# Patient Record
Sex: FEMALE | Race: WHITE | NOT HISPANIC OR LATINO | Employment: FULL TIME | URBAN - METROPOLITAN AREA
[De-identification: names, ages, dates, MRNs, and addresses within clinical notes are randomized per-mention and may not be internally consistent; named-entity substitution may affect disease eponyms.]

---

## 2017-05-17 ENCOUNTER — APPOINTMENT (OUTPATIENT)
Dept: URGENT CARE | Facility: MEDICAL CENTER | Age: 37
End: 2017-05-17

## 2017-05-17 ENCOUNTER — TRANSCRIBE ORDERS (OUTPATIENT)
Dept: URGENT CARE | Facility: MEDICAL CENTER | Age: 37
End: 2017-05-17

## 2017-05-17 ENCOUNTER — APPOINTMENT (OUTPATIENT)
Dept: LAB | Facility: MEDICAL CENTER | Age: 37
End: 2017-05-17
Attending: PHYSICIAN ASSISTANT

## 2017-05-17 DIAGNOSIS — Z00.8 SPECIAL EXAM: Primary | ICD-10-CM

## 2017-05-17 DIAGNOSIS — Z00.8 SPECIAL EXAM: ICD-10-CM

## 2017-05-17 LAB — RUBV IGG SERPL IA-ACNC: 44.5 IU/ML

## 2017-05-17 PROCEDURE — 86480 TB TEST CELL IMMUN MEASURE: CPT

## 2017-05-17 PROCEDURE — 86735 MUMPS ANTIBODY: CPT

## 2017-05-17 PROCEDURE — 86762 RUBELLA ANTIBODY: CPT

## 2017-05-17 PROCEDURE — 86787 VARICELLA-ZOSTER ANTIBODY: CPT

## 2017-05-17 PROCEDURE — 86765 RUBEOLA ANTIBODY: CPT

## 2017-05-17 PROCEDURE — 36415 COLL VENOUS BLD VENIPUNCTURE: CPT

## 2017-05-18 LAB
MEV IGG SER QL: NORMAL
MUV IGG SER QL: NORMAL

## 2017-05-19 LAB
ANNOTATION COMMENT IMP: NORMAL
GAMMA INTERFERON BACKGROUND BLD IA-ACNC: 0.03 IU/ML
M TB IFN-G BLD-IMP: NEGATIVE
M TB IFN-G CD4+ BCKGRND COR BLD-ACNC: <0.01 IU/ML
M TB IFN-G CD4+ T-CELLS BLD-ACNC: 0.02 IU/ML
MITOGEN IGNF BLD-ACNC: >10 IU/ML
QUANTIFERON-TB GOLD IN TUBE: NORMAL
SERVICE CMNT-IMP: NORMAL

## 2017-05-23 LAB — VZV IGG SER IA-ACNC: NORMAL

## 2020-01-24 ENCOUNTER — APPOINTMENT (EMERGENCY)
Dept: CT IMAGING | Facility: HOSPITAL | Age: 40
End: 2020-01-24
Payer: COMMERCIAL

## 2020-01-24 ENCOUNTER — APPOINTMENT (OUTPATIENT)
Dept: ULTRASOUND IMAGING | Facility: HOSPITAL | Age: 40
End: 2020-01-24
Payer: COMMERCIAL

## 2020-01-24 ENCOUNTER — HOSPITAL ENCOUNTER (OUTPATIENT)
Facility: HOSPITAL | Age: 40
Setting detail: OBSERVATION
Discharge: HOME | End: 2020-01-26
Attending: EMERGENCY MEDICINE | Admitting: STUDENT IN AN ORGANIZED HEALTH CARE EDUCATION/TRAINING PROGRAM
Payer: COMMERCIAL

## 2020-01-24 ENCOUNTER — APPOINTMENT (OUTPATIENT)
Dept: MRI IMAGING | Facility: HOSPITAL | Age: 40
End: 2020-01-24
Payer: COMMERCIAL

## 2020-01-24 DIAGNOSIS — R42 POSTURAL DIZZINESS WITH PRESYNCOPE: ICD-10-CM

## 2020-01-24 DIAGNOSIS — R00.2 HEART PALPITATIONS: ICD-10-CM

## 2020-01-24 DIAGNOSIS — G43.909 MIGRAINE: Primary | ICD-10-CM

## 2020-01-24 DIAGNOSIS — R55 POSTURAL DIZZINESS WITH PRESYNCOPE: ICD-10-CM

## 2020-01-24 PROBLEM — R00.1 SINUS BRADYCARDIA: Status: ACTIVE | Noted: 2020-01-24

## 2020-01-24 PROBLEM — D72.829 LEUKOCYTOSIS: Status: ACTIVE | Noted: 2020-01-24

## 2020-01-24 PROBLEM — R51.9 INTRACTABLE HEADACHE: Status: ACTIVE | Noted: 2020-01-24

## 2020-01-24 LAB
ALBUMIN SERPL BCP-MCNC: 3.6 G/DL (ref 3.5–5)
ALP SERPL-CCNC: 100 U/L (ref 46–116)
ALT SERPL W P-5'-P-CCNC: 55 U/L (ref 12–78)
ANION GAP SERPL CALCULATED.3IONS-SCNC: 11 MMOL/L (ref 4–13)
AST SERPL W P-5'-P-CCNC: 24 U/L (ref 5–45)
ATRIAL RATE: 54 BPM
ATRIAL RATE: 69 BPM
BASOPHILS # BLD AUTO: 0.01 THOUSANDS/ΜL (ref 0–0.1)
BASOPHILS NFR BLD AUTO: 0 % (ref 0–1)
BILIRUB DIRECT SERPL-MCNC: 0.05 MG/DL (ref 0–0.2)
BILIRUB SERPL-MCNC: 0.2 MG/DL (ref 0.2–1)
BILIRUB UR QL STRIP: NEGATIVE
BUN SERPL-MCNC: 15 MG/DL (ref 5–25)
CALCIUM SERPL-MCNC: 8.7 MG/DL (ref 8.3–10.1)
CHLORIDE SERPL-SCNC: 106 MMOL/L (ref 100–108)
CLARITY UR: NORMAL
CO2 SERPL-SCNC: 26 MMOL/L (ref 21–32)
COLOR UR: YELLOW
CREAT SERPL-MCNC: 0.8 MG/DL (ref 0.6–1.3)
EOSINOPHIL # BLD AUTO: 0 THOUSAND/ΜL (ref 0–0.61)
EOSINOPHIL NFR BLD AUTO: 0 % (ref 0–6)
ERYTHROCYTE [DISTWIDTH] IN BLOOD BY AUTOMATED COUNT: 14.1 % (ref 11.6–15.1)
GFR SERPL CREATININE-BSD FRML MDRD: 93 ML/MIN/1.73SQ M
GLUCOSE SERPL-MCNC: 141 MG/DL (ref 65–140)
GLUCOSE UR STRIP-MCNC: NEGATIVE MG/DL
HCT VFR BLD AUTO: 37.4 % (ref 34.8–46.1)
HGB BLD-MCNC: 11.6 G/DL (ref 11.5–15.4)
HGB UR QL STRIP.AUTO: NEGATIVE
IMM GRANULOCYTES # BLD AUTO: 0.03 THOUSAND/UL (ref 0–0.2)
IMM GRANULOCYTES NFR BLD AUTO: 0 % (ref 0–2)
KETONES UR STRIP-MCNC: NEGATIVE MG/DL
LEUKOCYTE ESTERASE UR QL STRIP: NEGATIVE
LYMPHOCYTES # BLD AUTO: 1.46 THOUSANDS/ΜL (ref 0.6–4.47)
LYMPHOCYTES NFR BLD AUTO: 13 % (ref 14–44)
MAGNESIUM SERPL-MCNC: 1.8 MG/DL (ref 1.6–2.6)
MCH RBC QN AUTO: 24.6 PG (ref 26.8–34.3)
MCHC RBC AUTO-ENTMCNC: 31 G/DL (ref 31.4–37.4)
MCV RBC AUTO: 79 FL (ref 82–98)
MONOCYTES # BLD AUTO: 0.9 THOUSAND/ΜL (ref 0.17–1.22)
MONOCYTES NFR BLD AUTO: 8 % (ref 4–12)
NEUTROPHILS # BLD AUTO: 8.54 THOUSANDS/ΜL (ref 1.85–7.62)
NEUTS SEG NFR BLD AUTO: 79 % (ref 43–75)
NITRITE UR QL STRIP: NEGATIVE
NRBC BLD AUTO-RTO: 0 /100 WBCS
P AXIS: 68 DEGREES
P AXIS: 69 DEGREES
PH UR STRIP.AUTO: 6.5 [PH]
PLATELET # BLD AUTO: 311 THOUSANDS/UL (ref 149–390)
PMV BLD AUTO: 9.6 FL (ref 8.9–12.7)
POTASSIUM SERPL-SCNC: 3.6 MMOL/L (ref 3.5–5.3)
PR INTERVAL: 122 MS
PR INTERVAL: 128 MS
PROT SERPL-MCNC: 8.1 G/DL (ref 6.4–8.2)
PROT UR STRIP-MCNC: NEGATIVE MG/DL
QRS AXIS: 60 DEGREES
QRS AXIS: 68 DEGREES
QRSD INTERVAL: 82 MS
QRSD INTERVAL: 94 MS
QT INTERVAL: 388 MS
QT INTERVAL: 414 MS
QTC INTERVAL: 392 MS
QTC INTERVAL: 415 MS
RBC # BLD AUTO: 4.71 MILLION/UL (ref 3.81–5.12)
SODIUM SERPL-SCNC: 143 MMOL/L (ref 136–145)
SP GR UR STRIP.AUTO: 1.02 (ref 1–1.03)
T WAVE AXIS: 52 DEGREES
T WAVE AXIS: 57 DEGREES
TROPONIN I SERPL-MCNC: <0.02 NG/ML
UROBILINOGEN UR QL STRIP.AUTO: 0.2 E.U./DL
VENTRICULAR RATE: 54 BPM
VENTRICULAR RATE: 69 BPM
WBC # BLD AUTO: 10.94 THOUSAND/UL (ref 4.31–10.16)

## 2020-01-24 PROCEDURE — 96375 TX/PRO/DX INJ NEW DRUG ADDON: CPT

## 2020-01-24 PROCEDURE — 84484 ASSAY OF TROPONIN QUANT: CPT | Performed by: EMERGENCY MEDICINE

## 2020-01-24 PROCEDURE — 80076 HEPATIC FUNCTION PANEL: CPT | Performed by: EMERGENCY MEDICINE

## 2020-01-24 PROCEDURE — 99244 OFF/OP CNSLTJ NEW/EST MOD 40: CPT | Performed by: PSYCHIATRY & NEUROLOGY

## 2020-01-24 PROCEDURE — 70551 MRI BRAIN STEM W/O DYE: CPT

## 2020-01-24 PROCEDURE — 70450 CT HEAD/BRAIN W/O DYE: CPT

## 2020-01-24 PROCEDURE — 93010 ELECTROCARDIOGRAM REPORT: CPT | Performed by: INTERNAL MEDICINE

## 2020-01-24 PROCEDURE — 99285 EMERGENCY DEPT VISIT HI MDM: CPT | Performed by: EMERGENCY MEDICINE

## 2020-01-24 PROCEDURE — 99285 EMERGENCY DEPT VISIT HI MDM: CPT

## 2020-01-24 PROCEDURE — 36415 COLL VENOUS BLD VENIPUNCTURE: CPT | Performed by: EMERGENCY MEDICINE

## 2020-01-24 PROCEDURE — 85025 COMPLETE CBC W/AUTO DIFF WBC: CPT | Performed by: EMERGENCY MEDICINE

## 2020-01-24 PROCEDURE — 99220 PR INITIAL OBSERVATION CARE/DAY 70 MINUTES: CPT | Performed by: FAMILY MEDICINE

## 2020-01-24 PROCEDURE — 80048 BASIC METABOLIC PNL TOTAL CA: CPT | Performed by: EMERGENCY MEDICINE

## 2020-01-24 PROCEDURE — 93880 EXTRACRANIAL BILAT STUDY: CPT

## 2020-01-24 PROCEDURE — 83735 ASSAY OF MAGNESIUM: CPT | Performed by: EMERGENCY MEDICINE

## 2020-01-24 PROCEDURE — 84443 ASSAY THYROID STIM HORMONE: CPT | Performed by: NURSE PRACTITIONER

## 2020-01-24 PROCEDURE — 81003 URINALYSIS AUTO W/O SCOPE: CPT | Performed by: EMERGENCY MEDICINE

## 2020-01-24 PROCEDURE — 93005 ELECTROCARDIOGRAM TRACING: CPT

## 2020-01-24 PROCEDURE — 96374 THER/PROPH/DIAG INJ IV PUSH: CPT

## 2020-01-24 RX ORDER — KETAMINE HCL IN NACL, ISO-OSM 100MG/10ML
20 SYRINGE (ML) INJECTION ONCE
Status: COMPLETED | OUTPATIENT
Start: 2020-01-24 | End: 2020-01-24

## 2020-01-24 RX ORDER — DIPHENHYDRAMINE HYDROCHLORIDE 50 MG/ML
25 INJECTION INTRAMUSCULAR; INTRAVENOUS EVERY 8 HOURS
Status: DISCONTINUED | OUTPATIENT
Start: 2020-01-24 | End: 2020-01-25

## 2020-01-24 RX ORDER — TOPIRAMATE 25 MG/1
TABLET ORAL 2 TIMES DAILY
Status: ON HOLD | COMMUNITY
End: 2020-01-26 | Stop reason: SDUPTHER

## 2020-01-24 RX ORDER — MECLIZINE HYDROCHLORIDE 25 MG/1
25 TABLET ORAL EVERY 8 HOURS PRN
Status: DISCONTINUED | OUTPATIENT
Start: 2020-01-24 | End: 2020-01-26 | Stop reason: HOSPADM

## 2020-01-24 RX ORDER — ONDANSETRON 2 MG/ML
4 INJECTION INTRAMUSCULAR; INTRAVENOUS ONCE
Status: COMPLETED | OUTPATIENT
Start: 2020-01-24 | End: 2020-01-24

## 2020-01-24 RX ORDER — TOPIRAMATE 25 MG/1
25 TABLET ORAL 2 TIMES DAILY
Status: DISCONTINUED | OUTPATIENT
Start: 2020-01-24 | End: 2020-01-26 | Stop reason: HOSPADM

## 2020-01-24 RX ORDER — SODIUM CHLORIDE 9 MG/ML
125 INJECTION, SOLUTION INTRAVENOUS CONTINUOUS
Status: DISCONTINUED | OUTPATIENT
Start: 2020-01-24 | End: 2020-01-26

## 2020-01-24 RX ORDER — SUMATRIPTAN 50 MG/1
50 TABLET, FILM COATED ORAL ONCE
Status: COMPLETED | OUTPATIENT
Start: 2020-01-24 | End: 2020-01-24

## 2020-01-24 RX ORDER — PREDNISONE 50 MG/1
TABLET ORAL
COMMUNITY
End: 2020-01-26 | Stop reason: HOSPADM

## 2020-01-24 RX ORDER — DIPHENHYDRAMINE HYDROCHLORIDE 50 MG/ML
50 INJECTION INTRAMUSCULAR; INTRAVENOUS ONCE
Status: COMPLETED | OUTPATIENT
Start: 2020-01-24 | End: 2020-01-24

## 2020-01-24 RX ORDER — MECLIZINE HYDROCHLORIDE 25 MG/1
25 TABLET ORAL 3 TIMES DAILY PRN
COMMUNITY
Start: 2020-01-23 | End: 2020-02-11 | Stop reason: ALTCHOICE

## 2020-01-24 RX ORDER — METOCLOPRAMIDE 10 MG/1
10 TABLET ORAL 4 TIMES DAILY
COMMUNITY
Start: 2020-01-23 | End: 2020-01-26 | Stop reason: HOSPADM

## 2020-01-24 RX ORDER — METOCLOPRAMIDE 10 MG/1
10 TABLET ORAL 4 TIMES DAILY
Status: DISCONTINUED | OUTPATIENT
Start: 2020-01-24 | End: 2020-01-24

## 2020-01-24 RX ORDER — LIDOCAINE 50 MG/G
2 PATCH TOPICAL DAILY
Status: DISCONTINUED | OUTPATIENT
Start: 2020-01-24 | End: 2020-01-26 | Stop reason: HOSPADM

## 2020-01-24 RX ORDER — KETOROLAC TROMETHAMINE 30 MG/ML
30 INJECTION, SOLUTION INTRAMUSCULAR; INTRAVENOUS ONCE
Status: COMPLETED | OUTPATIENT
Start: 2020-01-24 | End: 2020-01-24

## 2020-01-24 RX ORDER — MAGNESIUM SULFATE HEPTAHYDRATE 40 MG/ML
2 INJECTION, SOLUTION INTRAVENOUS DAILY
Status: DISCONTINUED | OUTPATIENT
Start: 2020-01-24 | End: 2020-01-26 | Stop reason: HOSPADM

## 2020-01-24 RX ORDER — CYCLOBENZAPRINE HCL 10 MG
5 TABLET ORAL 3 TIMES DAILY
Status: DISCONTINUED | OUTPATIENT
Start: 2020-01-24 | End: 2020-01-25

## 2020-01-24 RX ORDER — KETOROLAC TROMETHAMINE 30 MG/ML
30 INJECTION, SOLUTION INTRAMUSCULAR; INTRAVENOUS EVERY 8 HOURS
Status: DISCONTINUED | OUTPATIENT
Start: 2020-01-24 | End: 2020-01-26 | Stop reason: HOSPADM

## 2020-01-24 RX ORDER — METOCLOPRAMIDE HYDROCHLORIDE 5 MG/ML
10 INJECTION INTRAMUSCULAR; INTRAVENOUS EVERY 8 HOURS
Status: DISCONTINUED | OUTPATIENT
Start: 2020-01-24 | End: 2020-01-26 | Stop reason: HOSPADM

## 2020-01-24 RX ADMIN — VALPROATE SODIUM 1000 MG: 100 INJECTION, SOLUTION INTRAVENOUS at 16:18

## 2020-01-24 RX ADMIN — KETOROLAC TROMETHAMINE 30 MG: 30 INJECTION, SOLUTION INTRAMUSCULAR at 04:48

## 2020-01-24 RX ADMIN — SODIUM CHLORIDE 1000 MG: 0.9 INJECTION, SOLUTION INTRAVENOUS at 17:34

## 2020-01-24 RX ADMIN — SODIUM CHLORIDE 1000 ML: 0.9 INJECTION, SOLUTION INTRAVENOUS at 16:00

## 2020-01-24 RX ADMIN — MECLIZINE HYDROCHLORIDE 25 MG: 25 TABLET ORAL at 12:07

## 2020-01-24 RX ADMIN — KETOROLAC TROMETHAMINE 30 MG: 30 INJECTION, SOLUTION INTRAMUSCULAR at 22:52

## 2020-01-24 RX ADMIN — METOCLOPRAMIDE HYDROCHLORIDE 10 MG: 5 INJECTION INTRAMUSCULAR; INTRAVENOUS at 22:53

## 2020-01-24 RX ADMIN — Medication 20 MG: at 04:51

## 2020-01-24 RX ADMIN — METOCLOPRAMIDE HYDROCHLORIDE 10 MG: 10 TABLET ORAL at 12:07

## 2020-01-24 RX ADMIN — CYCLOBENZAPRINE HYDROCHLORIDE 5 MG: 10 TABLET, FILM COATED ORAL at 15:56

## 2020-01-24 RX ADMIN — DIPHENHYDRAMINE HYDROCHLORIDE 25 MG: 50 INJECTION, SOLUTION INTRAMUSCULAR; INTRAVENOUS at 22:55

## 2020-01-24 RX ADMIN — SODIUM CHLORIDE 125 ML/HR: 0.9 INJECTION, SOLUTION INTRAVENOUS at 12:17

## 2020-01-24 RX ADMIN — ONDANSETRON 4 MG: 2 INJECTION INTRAMUSCULAR; INTRAVENOUS at 04:46

## 2020-01-24 RX ADMIN — TOPIRAMATE 25 MG: 25 TABLET, FILM COATED ORAL at 17:41

## 2020-01-24 RX ADMIN — KETOROLAC TROMETHAMINE 30 MG: 30 INJECTION, SOLUTION INTRAMUSCULAR at 15:57

## 2020-01-24 RX ADMIN — DIPHENHYDRAMINE HYDROCHLORIDE 25 MG: 50 INJECTION, SOLUTION INTRAMUSCULAR; INTRAVENOUS at 15:58

## 2020-01-24 RX ADMIN — SUMATRIPTAN SUCCINATE 50 MG: 50 TABLET ORAL at 12:17

## 2020-01-24 RX ADMIN — DIPHENHYDRAMINE HYDROCHLORIDE 50 MG: 50 INJECTION, SOLUTION INTRAMUSCULAR; INTRAVENOUS at 04:49

## 2020-01-24 RX ADMIN — METOCLOPRAMIDE HYDROCHLORIDE 10 MG: 5 INJECTION INTRAMUSCULAR; INTRAVENOUS at 15:57

## 2020-01-24 RX ADMIN — CYCLOBENZAPRINE HYDROCHLORIDE 5 MG: 10 TABLET, FILM COATED ORAL at 22:51

## 2020-01-24 RX ADMIN — MAGNESIUM SULFATE HEPTAHYDRATE 2 G: 40 INJECTION, SOLUTION INTRAVENOUS at 15:59

## 2020-01-24 RX ADMIN — LIDOCAINE 2 PATCH: 50 PATCH TOPICAL at 15:53

## 2020-01-24 NOTE — H&P
H&P- Pennie Rodrigues 1980, 45 y o  female MRN: [id number]    Unit/Bed#: ED 18 Encounter: [id number]    Primary Care Provider: Reese Juarez DO   Date and time admitted to hospital: 1/24/2020  2:41 AM        Leukocytosis  Assessment & Plan  Noted mild leukocytosis  Likely reactive due to recent use of prednisone  Sinus bradycardia  Assessment & Plan  Noted bradycardia  Patient does report occasional palpitations  Will check thyroid    * Intractable headache  Assessment & Plan  This is likely related to migraine  However, the quality of migraine has changed within the past 2 months  Patient now has headaches every day  The intensity has also worsened  Patient has been started on Topamax, however, that has not improved her headaches  Will provide with Imitrex at this time  MRI of the brain  Neurology consult  VTE Prophylaxis: Other Medication: Ambulation  / sequential compression device   Code Status: full  POLST: POLST is not applicable to this patient  Discussion with family:  Patient's significant other    Anticipated Length of Stay:  Patient will be admitted on an Observation basis with an anticipated length of stay of  less than 2 midnights  Justification for Hospital Stay:  Intractable headache    Total Time for Visit, including Counseling / Coordination of Care: 1 hour  Greater than 50% of this total time spent on direct patient counseling and coordination of care  Chief Complaint:   Headache    History of Present Illness:    Pennie Rodrigues is a 45 y o  female who presents with an intractable headache  Patient states that her headache is currently rated at 9/10  It is associated with strong photophobia and phonophobia as well as dizziness  Patient reports worsening of headaches and increased frequency for the past 4 months  Overall, she has been struggling with headaches for the past 5 years, but only for the past several months she started having daily headaches    They are also associated with blurry vision and dizziness  Patient states that her neurologist has been working up her headaches and ordered MRI, but she has not done it yet  She denies any vomiting, abdominal pain, shortness of breath or chest pain  Patient reports a positive family history of migraines as well  Review of Systems:    Review of Systems   Constitutional: Positive for activity change  Negative for appetite change, fatigue and fever  HENT: Negative for congestion  Eyes: Positive for photophobia and visual disturbance  Negative for discharge  Respiratory: Negative for shortness of breath  Cardiovascular: Negative for chest pain and leg swelling  Gastrointestinal: Negative for abdominal pain  Genitourinary: Negative for dysuria and flank pain  Musculoskeletal: Negative for back pain and gait problem  Skin: Negative for pallor  Neurological: Positive for dizziness and headaches  Negative for weakness, light-headedness and numbness  Psychiatric/Behavioral: Negative for behavioral problems  Past Medical and Surgical History:     Past Medical History:   Diagnosis Date    Ear infection     Migraine        Past Surgical History:   Procedure Laterality Date     SECTION         Meds/Allergies:    Prior to Admission medications    Medication Sig Start Date End Date Taking? Authorizing Provider   meclizine (ANTIVERT) 25 mg tablet Take 25 mg by mouth Three times daily as needed 20 Yes Historical Provider, MD   metoclopramide (REGLAN) 10 mg tablet Take 10 mg by mouth 4 (four) times a day 20 Yes Historical Provider, MD   predniSONE 50 mg tablet Take by mouth   Yes Historical Provider, MD   topiramate (TOPAMAX) 25 mg tablet Take by mouth 2 (two) times a day   Yes Historical Provider, MD     I have reviewed home medications with patient personally  Allergies:    Allergies   Allergen Reactions    Levaquin [Levofloxacin] Hallucinations    Lexapro [Escitalopram]        Social History:     Marital Status: /Civil Union   Occupation:   Patient Pre-hospital Living Situation:  Lives with the family  Patient Pre-hospital Level of Mobility:  Independent  Patient Pre-hospital Diet Restrictions:  None  Substance Use History:   Social History     Substance and Sexual Activity   Alcohol Use Never    Frequency: Never     Social History     Tobacco Use   Smoking Status Never Smoker   Smokeless Tobacco Never Used     Social History     Substance and Sexual Activity   Drug Use Not on file       Family History:    Migraines    Physical Exam:     Vitals:   Blood Pressure: 134/58 (01/24/20 0930)  Pulse: (!) 47 (01/24/20 0930)  Temperature: 97 8 °F (36 6 °C) (01/24/20 0247)  Temp Source: Oral (01/24/20 0247)  Respirations: 16 (01/24/20 0930)  Height: 5' 3" (160 cm) (01/24/20 0247)  Weight - Scale: 85 1 kg (187 lb 9 8 oz) (01/24/20 0245)  SpO2: 100 % (01/24/20 0930)    Physical Exam   Constitutional: She is oriented to person, place, and time  She appears well-developed and well-nourished  HENT:   Head: Normocephalic and atraumatic  Eyes: Pupils are equal, round, and reactive to light  Conjunctivae are normal    Neck: Normal range of motion  Cardiovascular: Normal rate and regular rhythm  Pulmonary/Chest: Effort normal and breath sounds normal    Abdominal: Soft  She exhibits no distension  Musculoskeletal: She exhibits no edema  Neurological: She is alert and oriented to person, place, and time  She has normal strength  No cranial nerve deficit  Skin: Skin is warm  No erythema  Psychiatric: She has a normal mood and affect  Her behavior is normal          Additional Data:     Lab Results: I have personally reviewed pertinent reports        Results from last 7 days   Lab Units 01/24/20  0321   WBC Thousand/uL 10 94*   HEMOGLOBIN g/dL 11 6   HEMATOCRIT % 37 4   PLATELETS Thousands/uL 311   NEUTROS PCT % 79*   LYMPHS PCT % 13*   MONOS PCT % 8   EOS PCT % 0 Results from last 7 days   Lab Units 01/24/20  0321   SODIUM mmol/L 143   POTASSIUM mmol/L 3 6   CHLORIDE mmol/L 106   CO2 mmol/L 26   BUN mg/dL 15   CREATININE mg/dL 0 80   ANION GAP mmol/L 11   CALCIUM mg/dL 8 7   ALBUMIN g/dL 3 6   TOTAL BILIRUBIN mg/dL 0 20   ALK PHOS U/L 100   ALT U/L 55   AST U/L 24   GLUCOSE RANDOM mg/dL 141*                       Imaging: I have personally reviewed pertinent reports  CT head without contrast   Final Result by Eva Webb MD (01/24 0417)      No acute intracranial abnormality  Workstation performed: [id number]         MRI inpatient order    (Results Pending)       EKG, Pathology, and Other Studies Reviewed on Admission:   · EKG:     Allscripts / Epic Records Reviewed: Yes     ** Please Note: This note has been constructed using a voice recognition system   **

## 2020-01-24 NOTE — QUICK NOTE
Entered the room to give patient medication and to try and get her blood work but patients  asked me to leave so the patient could sleep stating she just fell asleep  Will try again in a short while

## 2020-01-24 NOTE — ED PROVIDER NOTES
History  Chief Complaint   Patient presents with    Chest Pain     pt [place] [facility] [place]raines and near syncopal episodes; pt currently c/o intermittant chest heaviness and pressure with sob     45year old female patient presents emergency department for evaluation of palpitations  The patient states that she felt that her heart was beating erratically and fast she describes as a quivering  The patient is currently in a sinus rhythm at a rate of between 60 and 62  Her periods of sinus arrhythmia  The patient has no cardiac history  Earlier today the patient was seen at another facility for migraines  Her headache has lasted for approximately four months  She has seen Neurology, given a cocktail medications that did break the headache which returned today  Currently the patient has no neurologic dysfunction but does states some blurry vision with the headaches  There has been no imaging of her brain done prior to today  Currently patient is lying in bed, in mild distress mostly nervous of finding something bad on the CT scan  Patient did have two near syncopal episodes today as well  Patient will be given IV fluids, bloods were drawn, the patient will be evaluated with a differential diagnosis includes was not limited to intracranial hemorrhage, subacute stroke, atypical migraine, dehydration  History provided by:  Patient   used: No    Palpitations   Palpitations quality:  Regular  Onset quality:  Sudden  Timing:  Constant  Progression:  Worsening  Chronicity:  New  Context: not anxiety, not blood loss, not bronchodilators, not dehydration and not hyperventilation    Relieved by:  Nothing  Worsened by:  Nothing  Ineffective treatments:  None tried  Associated symptoms: no chest pressure, no cough, no leg pain, no malaise/fatigue, no orthopnea and no PND        Prior to Admission Medications   Prescriptions Last Dose Informant Patient Reported?  Taking?   meclizine (ANTIVERT) 25 mg tablet 2020 at Unknown time  Yes Yes   Sig: Take 25 mg by mouth Three times daily as needed   metoclopramide (REGLAN) 10 mg tablet 2020 at Unknown time  Yes Yes   Sig: Take 10 mg by mouth 4 (four) times a day   predniSONE 50 mg tablet 2020 at Unknown time  Yes Yes   Sig: Take by mouth   topiramate (TOPAMAX) 25 mg tablet 2020 at Unknown time  Yes Yes   Sig: Take by mouth 2 (two) times a day      Facility-Administered Medications: None       Past Medical History:   Diagnosis Date    Ear infection     Migraine        Past Surgical History:   Procedure Laterality Date     SECTION         History reviewed  No pertinent family history  I have reviewed and agree with the history as documented  Social History     Tobacco Use    Smoking status: Never Smoker    Smokeless tobacco: Never Used   Substance Use Topics    Alcohol use: Never     Frequency: Never    Drug use: Not on file        Review of Systems   Constitutional: Negative for malaise/fatigue  Respiratory: Negative for cough  Cardiovascular: Negative for orthopnea and PND  All other systems reviewed and are negative  Physical Exam  Physical Exam   Constitutional: She is oriented to person, place, and time  She appears well-developed and well-nourished  HENT:   Head: Normocephalic and atraumatic  Right Ear: External ear normal    Left Ear: External ear normal    Eyes: Conjunctivae and EOM are normal    Neck: No JVD present  No tracheal deviation present  No thyromegaly present  Cardiovascular: Normal rate  Pulmonary/Chest: Effort normal and breath sounds normal  No stridor  Abdominal: Soft  She exhibits no distension and no mass  There is no tenderness  There is no guarding  No hernia  Musculoskeletal: Normal range of motion  She exhibits no edema, tenderness or deformity  Lymphadenopathy:     She has no cervical adenopathy     Neurological: She is alert and oriented to person, place, and time    Skin: Skin is warm  No rash noted  No erythema  No pallor  Psychiatric: She has a normal mood and affect  Her behavior is normal    Nursing note and vitals reviewed        Vital Signs  ED Triage Vitals   Temperature Pulse Respirations Blood Pressure SpO2   01/24/20 0247 01/24/20 0247 01/24/20 0247 01/24/20 0247 01/24/20 0247   97 8 °F (36 6 °C) 75 20 165/91 99 %      Temp Source Heart Rate Source Patient Position - Orthostatic VS BP Location FiO2 (%)   01/24/20 0247 01/24/20 0247 01/24/20 1217 01/24/20 0330 --   Oral Monitor Lying Left arm       Pain Score       01/24/20 0313       4           Vitals:    01/25/20 0759 01/25/20 1509 01/25/20 2345 01/26/20 0803   BP: 127/66 139/80 134/73 149/76   Pulse: (!) 50 (!) 52 56 (!) 39   Patient Position - Orthostatic VS:             Visual Acuity  Visual Acuity      Most Recent Value   L Pupil Size (mm)  3   R Pupil Size (mm)  3          ED Medications  Medications   meclizine (ANTIVERT) tablet 25 mg (25 mg Oral Given 1/26/20 0923)   topiramate (TOPAMAX) tablet 25 mg (25 mg Oral Given 1/26/20 0850)   methylPREDNISolone sodium succinate (Solu-MEDROL) 1,000 mg in sodium chloride 0 9 % 250 mL IVPB (1,000 mg Intravenous Not Given 1/26/20 1147)   ketorolac (TORADOL) injection 30 mg (30 mg Intravenous Given 1/26/20 0850)   metoclopramide (REGLAN) injection 10 mg (10 mg Intravenous Given 1/26/20 0850)   magnesium sulfate 2 g/50 mL IVPB (premix) 2 g (2 g Intravenous New Bag 1/26/20 0849)   valproate (DEPACON) 1,000 mg in sodium chloride 0 9 % 50 mL BOLUS (1,000 mg Intravenous Not Given 1/26/20 1147)   lidocaine (LIDODERM) 5 % patch 2 patch (2 patches Topical Medication Applied 1/26/20 0849)   sodium chloride 0 9 % bolus 1,000 mL (1,000 mL Intravenous Not Given 1/26/20 1147)   diphenhydrAMINE (BENADRYL) injection 50 mg (50 mg Intravenous Given 1/24/20 0449)   ketorolac (TORADOL) injection 30 mg (30 mg Intravenous Given 1/24/20 0448)   ondansetron (ZOFRAN) injection 4 mg (4 mg Intravenous Given 1/24/20 0446)   Ketamine HCl 20 mg (20 mg Intravenous Given 1/24/20 0451)   SUMAtriptan (IMITREX) tablet 50 mg (50 mg Oral Given 1/24/20 1217)   diazepam (VALIUM) tablet 2 5 mg (2 5 mg Oral Given 1/26/20 1233)       Diagnostic Studies  Results Reviewed     Procedure Component Value Units Date/Time    TSH, 3rd generation [[id number]]  (Abnormal) Collected:  01/24/20 0321    Lab Status:  Final result Specimen:  Blood from Arm, Right Updated:  01/25/20 1207     TSH 3RD GENERATON 0 281 uIU/mL     Narrative:       Patients undergoing fluorescein dye angiography may retain small amounts of fluorescein in the body for 48-72 hours post procedure  Samples containing fluorescein can produce falsely depressed TSH values  If the patient had this procedure,a specimen should be resubmitted post fluorescein clearance        UA w Reflex to Microscopic w Reflex to Culture [[id number]] Collected:  01/24/20 1001    Lab Status:  Final result Specimen:  Urine, Clean Catch Updated:  01/24/20 1017     Color, UA Yellow     Clarity, UA Cloudy     Specific Gravity, UA 1 025     pH, UA 6 5     Leukocytes, UA Negative     Nitrite, UA Negative     Protein, UA Negative mg/dl      Glucose, UA Negative mg/dl      Ketones, UA Negative mg/dl      Urobilinogen, UA 0 2 E U /dl      Bilirubin, UA Negative     Blood, UA Negative    Troponin I [[id number]]  (Normal) Collected:  01/24/20 0446    Lab Status:  Final result Specimen:  Blood from Arm, Right Updated:  01/24/20 0541     Troponin I <0 02 ng/mL     Basic metabolic panel [[id number]]  (Abnormal) Collected:  01/24/20 0321    Lab Status:  Final result Specimen:  Blood from Arm, Right Updated:  01/24/20 0350     Sodium 143 mmol/L      Potassium 3 6 mmol/L      Chloride 106 mmol/L      CO2 26 mmol/L      ANION GAP 11 mmol/L      BUN 15 mg/dL      Creatinine 0 80 mg/dL      Glucose 141 mg/dL      Calcium 8 7 mg/dL      eGFR 93 ml/min/1 73sq m     Narrative:       National Kidney Disease Foundation guidelines for Chronic Kidney Disease (CKD):     Stage 1 with normal or high GFR (GFR > 90 mL/min/1 73 square meters)    Stage 2 Mild CKD (GFR = 60-89 mL/min/1 73 square meters)    Stage 3A Moderate CKD (GFR = 45-59 mL/min/1 73 square meters)    Stage 3B Moderate CKD (GFR = 30-44 mL/min/1 73 square meters)    Stage 4 Severe CKD (GFR = 15-29 mL/min/1 73 square meters)    Stage 5 End Stage CKD (GFR <15 mL/min/1 73 square meters)  Note: GFR calculation is accurate only with a steady state creatinine    Hepatic function panel [[id number]]  (Normal) Collected:  01/24/20 0321    Lab Status:  Final result Specimen:  Blood from Arm, Right Updated:  01/24/20 0350     Total Bilirubin 0 20 mg/dL      Bilirubin, Direct 0 05 mg/dL      Alkaline Phosphatase 100 U/L      AST 24 U/L      ALT 55 U/L      Total Protein 8 1 g/dL      Albumin 3 6 g/dL     Magnesium [[id number]]  (Normal) Collected:  01/24/20 0321    Lab Status:  Final result Specimen:  Blood from Arm, Right Updated:  01/24/20 0350     Magnesium 1 8 mg/dL     CBC and differential [[id number]]  (Abnormal) Collected:  01/24/20 0321    Lab Status:  Final result Specimen:  Blood from Arm, Right Updated:  01/24/20 0333     WBC 10 94 Thousand/uL      RBC 4 71 Million/uL      Hemoglobin 11 6 g/dL      Hematocrit 37 4 %      MCV 79 fL      MCH 24 6 pg      MCHC 31 0 g/dL      RDW 14 1 %      MPV 9 6 fL      Platelets 334 Thousands/uL      nRBC 0 /100 WBCs      Neutrophils Relative 79 %      Immat GRANS % 0 %      Lymphocytes Relative 13 %      Monocytes Relative 8 %      Eosinophils Relative 0 %      Basophils Relative 0 %      Neutrophils Absolute 8 54 Thousands/µL      Immature Grans Absolute 0 03 Thousand/uL      Lymphocytes Absolute 1 46 Thousands/µL      Monocytes Absolute 0 90 Thousand/µL      Eosinophils Absolute 0 00 Thousand/µL      Basophils Absolute 0 01 Thousands/µL                  MRI brain wo contrast   Final Result by Enrique Weinberg DO (01/24 1640)      Normal       Workstation performed: [id number]         VAS carotid complete study   Final Result by Nito Aguilar MD (01/25 1141)      CT head without contrast   Final Result by Erlin Buckley MD (01/24 0417)      No acute intracranial abnormality  Workstation performed: [id number]                    Procedures  Procedures         ED Course         HEART Risk Score      Most Recent Value   History  0 Filed at: 01/24/2020 0631   ECG  0 Filed at: 01/24/2020 0631   Age  0 Filed at: 01/24/2020 0631   Risk Factors  1 Filed at: 01/24/2020 0631   Troponin  0 Filed at: 01/24/2020 0631   Heart Score Risk Calculator   History  0 Filed at: 01/24/2020 0631   ECG  0 Filed at: 01/24/2020 0631   Age  0 Filed at: 01/24/2020 0631   Risk Factors  1 Filed at: 01/24/2020 0631   Troponin  0 Filed at: 01/24/2020 0631   HEART Score  1 Filed at: 01/24/2020 0631   HEART Score  1 Filed at: 01/24/2020 6203                            MDM        Disposition  Final diagnoses:   Heart palpitations     Time reflects when diagnosis was documented in both MDM as applicable and the Disposition within this note     Time User Action Codes Description Comment    1/24/2020 11:32 AM [facility] Add [G43 909] Migraine     1/26/2020  3:21 PM Geraline Adjutant Add [M68,  R55] Postural dizziness with presyncope     1/26/2020  4:42 PM Thora Muster Add [R00 2] Heart palpitations       ED Disposition     ED Disposition Condition Date/Time Comment    Admit Stable Fri Jan 24, 2020  6:26 AM Case was discussed with Melissa Serna and the patient's admission status was agreed to be Admission Status: observation status to the service of Dr Eliseo Christopher           Follow-up Information     Follow up With Specialties Details Why Contact Info    Ezio García DO Family Medicine Follow up  [address] [place] [address]      Cornelia Zheng MD Neurology Follow up  [place] [address]  [phone] Discharge Medication List as of 1/26/2020  3:35 PM      START taking these medications    Details   diazepam (VALIUM) 5 mg tablet Take 0 5 tablets (2 5 mg total) by mouth every 6 (six) hours as needed for anxiety (dizziness) for up to 10 days, Starting Sun 1/26/2020, Until Wed 2/5/2020, Print         CONTINUE these medications which have CHANGED    Details   predniSONE 20 mg tablet 60 mg daily x2 days; 50 mg daily x2 days; 40 mg daily x2 days; 30 mg daily x2 days; 20 mg daily x2 days; 10 mg daily x2 days then stop, Print      topiramate (TOPAMAX) 50 MG tablet Take 1 tablet (50 mg total) by mouth 2 (two) times a day, Starting Sun 1/26/2020, Print         CONTINUE these medications which have NOT CHANGED    Details   meclizine (ANTIVERT) 25 mg tablet Take 25 mg by mouth Three times daily as needed, Starting Thu 1/23/2020, Until Fri 1/22/2021, Historical Med         STOP taking these medications       metoclopramide (REGLAN) 10 mg tablet Comments:   Reason for Stopping:             Outpatient Discharge Orders   Ambulatory referral to Neurology   Standing Status: Future Standing Exp  Date: 01/26/21      Ambulatory referral to Physical Therapy   Standing Status: Future Standing Exp   Date: 01/26/21      Activity as tolerated       ED Provider  Electronically Signed by           Elkin Cho DO  01/26/20 7243

## 2020-01-24 NOTE — ASSESSMENT & PLAN NOTE
This is likely related to migraine  However, the quality of migraine has changed within the past 2 months  Patient now has headaches every day  The intensity has also worsened  Patient has been started on Topamax, however, that has not improved her headaches  Will provide with Imitrex at this time  MRI of the brain  Neurology consult

## 2020-01-24 NOTE — LETTER
[address] [address]  Dept: [phone]    January 26, 2020     Patient: Suleman Up   YOB: 1980   Date of Visit: 1/24/2020       To Whom it May Concern:    Suleman Up is under my professional care  She was seen in the hospital from 1/24/2020   to 01/26/20  She may return to work on Tuesday January 28, 2020 without limitations  If you have any questions or concerns, please don't hesitate to call           Sincerely,          Erica Le

## 2020-01-24 NOTE — CONSULTS
PATIENT NAME: Michelle Mejia OF BIRTH: 1980   MEDICAL RECORD NUMBER: [id number]  Chief Complaint/Reason for Consult: migraine    HPI: Jun Alexandra is a 45 y o  female with episodic migraines who came in for  Palpitations  Yesterday prior to coming to us she came into a [facility] facility with complaints of dizziness for a day and a "constant migraine for the past 4 months" and had seen a neurologist the day before and was started on Prednisone and Topamax  She was also to obtain an MRI with contrast  She has had a few near syncopal episodes as well  She otherwise has no diplopia, dysphagia, numbness weakness or tingling  She has photophobia and nausea with her headaches  She has a history of migraine for years as does her mother  PAST MEDICAL HISTORY  Past Medical History:   Diagnosis Date    Ear infection     Migraine         PAST SURGICAL HISTORY  Past Surgical History:   Procedure Laterality Date     SECTION          ALLERGIES: Levaquin [levofloxacin] and Lexapro [escitalopram]     CURRENT MEDICATIONS  Scheduled Meds:  Current Facility-Administered Medications:  meclizine 25 mg Oral Q8H PRN Terri Keyes MD    metoclopramide 10 mg Oral 4x Daily Terri Keyes MD    sodium chloride 125 mL/hr Intravenous Continuous Terri Keyes MD Last Rate: 125 mL/hr (20 1217)   topiramate 25 mg Oral BID Terri Keyes MD      Continuous Infusions:  sodium chloride 125 mL/hr Last Rate: 125 mL/hr (20)     PRN Meds:   meclizine     SOCIAL HISTORY   reports that she has never smoked  She has never used smokeless tobacco  She reports that she does not drink alcohol  FAMILY HISTORY  Negative except for HPI  REVIEW OF SYSTEMS   Extensive 12 point ROS conducted, negative except per HPI       PHYSICAL EXAMINATION  Temp:  [97 8 °F (36 6 °C)] 97 8 °F (36 6 °C)  HR:  [47-75] 64  Resp:  [15-20] 16  BP: (108-165)/(58-91) 155/74   General Examination: In no apparent distress, well developed and well nourished, and cooperative   HEENT: Normocephalic, Atraumatic  Moist mucus membranes  Anicteric  PPC    CVS: Regular rate and rhythm  S1 S2 noted  No audible murmurs  No carotids bruits  Peripheral pulses palpable throughout   Lungs: Clear to auscultation bilaterally  No rales, rhonchi, wheezing  Abdomen: Bowel sounds positive  Non- tender  Non-distended  No organomegaly  Ext: No edema   Psych: Thought content - No VH/AH  No delusions  Though Process - logical    Skin - No rash    Neurological Examination:      Mental Status: The patient was awake, alert, attentive, oriented to person, place, and time  Recent and remote memory intact to conversation with no evidence of language dysfunction  Satisfactory fund of knowledge  Normal attention span and concentration  Able to name, repeat, describe a complex scene  Cranial Nerves:   I: smell Not tested   II: visual fields Full to confrontation  Pupils equal, round, reactive to light with normal accomodation  Fundus: benign fundus  III,IV,VI: extraocular muscles EOMI, no nystagmus   V: masseter and pterygoid strength full  Sensation in the V1 through V3 distributions intact to pinprick and light touch bilaterally  VII: Face is symmetric with no weakness noted  VIII: Audition intact to finger rub bilaterally  IX/X: Uvula midline  Soft palate elevation symmetric  XI: Trapezius and SCM strength 5/5 B/L  XII: Tongue midline with no atrophy or fasciculations with appropriate movement  Motor Examination:   No pronator drift  Bulk: Normal  No atrophy Tone: Normal  Fasciculations: None        Deltoid Biceps Triceps WE   WF   FF IO     Right        5         5          5         5      5      5   5        Left           5        5          5          5      5     5   5                       IP        Quad   Ham     TA       Gastroc   Right      5            5          5         5                5  Left         5            5         5 5                5       Reflexes:                   Biceps Brachioradialis Triceps Patella Achilles Plantars   Right          2+            2+                  2+        2+       2+         Down   Left            2+             2+                 2+         2+       2+         Down     Clonus: None    Pathological Reflexes:  Hoffmans: negative  Babinsky: negative  Jaw Jerk: negative    Coordination: Patient able to perform normal finger-to-nose and heel to shin appropriately  Normal rapid alternating movements  Sensory: Normal sensation to light touch, pin prick and vibratory sensation throughout  Gait:normal stance and posture, normal stride length and arm swing, normal turn around  Patient able to perform tandem gait without difficulty  Able toe walk and heel walk without difficulty  Romberg negative      Labs:  Recent Results (from the past 24 hour(s))   CBC and differential    Collection Time: 01/24/20  3:21 AM   Result Value Ref Range    WBC 10 94 (H) 4 31 - 10 16 Thousand/uL    RBC 4 71 3 81 - 5 12 Million/uL    Hemoglobin 11 6 11 5 - 15 4 g/dL    Hematocrit 37 4 34 8 - 46 1 %    MCV 79 (L) 82 - 98 fL    MCH 24 6 (L) 26 8 - 34 3 pg    MCHC 31 0 (L) 31 4 - 37 4 g/dL    RDW 14 1 11 6 - 15 1 %    MPV 9 6 8 9 - 12 7 fL    Platelets 609 848 - 673 Thousands/uL    nRBC 0 /100 WBCs    Neutrophils Relative 79 (H) 43 - 75 %    Immat GRANS % 0 0 - 2 %    Lymphocytes Relative 13 (L) 14 - 44 %    Monocytes Relative 8 4 - 12 %    Eosinophils Relative 0 0 - 6 %    Basophils Relative 0 0 - 1 %    Neutrophils Absolute 8 54 (H) 1 85 - 7 62 Thousands/µL    Immature Grans Absolute 0 03 0 00 - 0 20 Thousand/uL    Lymphocytes Absolute 1 46 0 60 - 4 47 Thousands/µL    Monocytes Absolute 0 90 0 17 - 1 22 Thousand/µL    Eosinophils Absolute 0 00 0 00 - 0 61 Thousand/µL    Basophils Absolute 0 01 0 00 - 0 10 Thousands/µL   Basic metabolic panel    Collection Time: 01/24/20  3:21 AM   Result Value Ref Range    Sodium 143 136 - 145 mmol/L    Potassium 3 6 3 5 - 5 3 mmol/L    Chloride 106 100 - 108 mmol/L    CO2 26 21 - 32 mmol/L    ANION GAP 11 4 - 13 mmol/L    BUN 15 5 - 25 mg/dL    Creatinine 0 80 0 60 - 1 30 mg/dL    Glucose 141 (H) 65 - 140 mg/dL    Calcium 8 7 8 3 - 10 1 mg/dL    eGFR 93 ml/min/1 73sq m   Hepatic function panel    Collection Time: 01/24/20  3:21 AM   Result Value Ref Range    Total Bilirubin 0 20 0 20 - 1 00 mg/dL    Bilirubin, Direct 0 05 0 00 - 0 20 mg/dL    Alkaline Phosphatase 100 46 - 116 U/L    AST 24 5 - 45 U/L    ALT 55 12 - 78 U/L    Total Protein 8 1 6 4 - 8 2 g/dL    Albumin 3 6 3 5 - 5 0 g/dL   Magnesium    Collection Time: 01/24/20  3:21 AM   Result Value Ref Range    Magnesium 1 8 1 6 - 2 6 mg/dL   Troponin I    Collection Time: 01/24/20  4:46 AM   Result Value Ref Range    Troponin I <0 02 <=0 04 ng/mL   UA w Reflex to Microscopic w Reflex to Culture    Collection Time: 01/24/20 10:01 AM   Result Value Ref Range    Color, UA Yellow     Clarity, UA Cloudy     Specific [place], UA 1 025 1 003 - 1 030    pH, UA 6 5 4 5, 5 0, 5 5, 6 0, 6 5, 7 0, 7 5, 8 0    Leukocytes, UA Negative Negative    Nitrite, UA Negative Negative    Protein, UA Negative Negative mg/dl    Glucose, UA Negative Negative mg/dl    Ketones, UA Negative Negative mg/dl    Urobilinogen, UA 0 2 0 2, 1 0 E U /dl E U /dl    Bilirubin, UA Negative Negative    Blood, UA Negative Negative            panel  Results from last 7 days   Lab Units 01/24/20  0321   POTASSIUM mmol/L 3 6   CHLORIDE mmol/L 106   BUN mg/dL 15   CREATININE mg/dL 0 80    Results from last 7 days   Lab Units 01/24/20  0321   HEMATOCRIT % 37 4   HEMOGLOBIN g/dL 11 6   MCH pg 24 6*   MCHC g/dL 31 0*   MCV fL 79*   MPV fL 9 6   PLATELETS Thousands/uL 311   RDW % 14 1   WBC Thousand/uL 10 94*          Invalid input(s): LABPT Results from last 7 days   Lab Units 01/24/20  0321   SODIUM mmol/L 143   CO2 mmol/L 26   BUN mg/dL 15   CREATININE mg/dL 0 80    Lab Results   Component Value Date    ALT 55 01/24/2020    AST 24 01/24/2020    ALKPHOS 100 01/24/2020    TBILI 0 20 01/24/2020          Invalid input(s): TRIGLYCERIDE No results found for: HGBA1C TSH i: No results found for: TSH Imaging: CT head     MRI brain - negative (images personally reviewed)    ASSESSMENT/PLAN  46 yo female with status migrainosus and intractable migraines  Aggressive migraine therapy --> wrote for depacon, mag, toradol, solumedrol, reglan/benadryl and IVF  DHE can be reserved if no response  Will need steroid taper on discharged --> prednisone 60 mg daily x 2 days, 50 mg daily x 2 days, 40 mg daily x 2 days, 30 mg daily x 2 days, 20  Mg daily x 2 days, 10 mg daily x 2 days

## 2020-01-24 NOTE — PLAN OF CARE
Problem: PAIN - ADULT  Goal: Verbalizes/displays adequate comfort level or baseline comfort level  Description  Interventions:  - Encourage patient to monitor pain and request assistance  - Assess pain using appropriate pain scale  - Administer analgesics based on type and severity of pain and evaluate response  - Implement non-pharmacological measures as appropriate and evaluate response  - Consider cultural and social influences on pain and pain management  - Notify physician/advanced practitioner if interventions unsuccessful or patient reports new pain  Outcome: Progressing     Problem: DISCHARGE PLANNING  Goal: Discharge to home or other facility with appropriate resources  Description  INTERVENTIONS:  - Identify barriers to discharge w/patient and caregiver  - Arrange for needed discharge resources and transportation as appropriate  - Identify discharge learning needs (meds, wound care, etc )  - Arrange for interpretive services to assist at discharge as needed  - Refer to Case Management Department for coordinating discharge planning if the patient needs post-hospital services based on physician/advanced practitioner order or complex needs related to functional status, cognitive ability, or social support system  Outcome: Progressing     Problem: Knowledge Deficit  Goal: Patient/family/caregiver demonstrates understanding of disease process, treatment plan, medications, and discharge instructions  Description  Complete learning assessment and assess knowledge base    Interventions:  - Provide teaching at level of understanding  - Provide teaching via preferred learning methods  Outcome: Progressing     Problem: NEUROSENSORY - ADULT  Goal: Achieves stable or improved neurological status  Description  INTERVENTIONS  - Monitor and report changes in neurological status  - Monitor vital signs such as temperature, blood pressure, glucose, and any other labs ordered   - Initiate measures to prevent increased intracranial pressure  - Monitor for seizure activity and implement precautions if appropriate      Outcome: Progressing     Problem: METABOLIC, FLUID AND ELECTROLYTES - ADULT  Goal: Electrolytes maintained within normal limits  Description  INTERVENTIONS:  - Monitor labs and assess patient for signs and symptoms of electrolyte imbalances  - Administer electrolyte replacement as ordered  - Monitor response to electrolyte replacements, including repeat lab results as appropriate  - Instruct patient on fluid and nutrition as appropriate  Outcome: Progressing  Goal: Fluid balance maintained  Description  INTERVENTIONS:  - Monitor labs   - Monitor I/O and WT  - Instruct patient on fluid and nutrition as appropriate  - Assess for signs & symptoms of volume excess or deficit  Outcome: Progressing

## 2020-01-24 NOTE — ASSESSMENT & PLAN NOTE
Patient also reported several presyncopal episodes  Unknown etiology at this time, but awaiting for ultrasound of the carotids  Will also provide hydration  This may be a component of a complicated migraine

## 2020-01-25 LAB — TSH SERPL DL<=0.05 MIU/L-ACNC: 0.28 UIU/ML (ref 0.36–3.74)

## 2020-01-25 PROCEDURE — 99225 PR SBSQ OBSERVATION CARE/DAY 25 MINUTES: CPT | Performed by: PSYCHIATRY & NEUROLOGY

## 2020-01-25 PROCEDURE — 99225 PR SBSQ OBSERVATION CARE/DAY 25 MINUTES: CPT | Performed by: NURSE PRACTITIONER

## 2020-01-25 PROCEDURE — 93880 EXTRACRANIAL BILAT STUDY: CPT | Performed by: SURGERY

## 2020-01-25 RX ADMIN — METOCLOPRAMIDE HYDROCHLORIDE 10 MG: 5 INJECTION INTRAMUSCULAR; INTRAVENOUS at 08:26

## 2020-01-25 RX ADMIN — MAGNESIUM SULFATE HEPTAHYDRATE 2 G: 40 INJECTION, SOLUTION INTRAVENOUS at 09:20

## 2020-01-25 RX ADMIN — TOPIRAMATE 25 MG: 25 TABLET, FILM COATED ORAL at 17:38

## 2020-01-25 RX ADMIN — VALPROATE SODIUM 1000 MG: 100 INJECTION, SOLUTION INTRAVENOUS at 08:23

## 2020-01-25 RX ADMIN — KETOROLAC TROMETHAMINE 30 MG: 30 INJECTION, SOLUTION INTRAMUSCULAR at 23:22

## 2020-01-25 RX ADMIN — SODIUM CHLORIDE 125 ML/HR: 0.9 INJECTION, SOLUTION INTRAVENOUS at 20:42

## 2020-01-25 RX ADMIN — METOCLOPRAMIDE HYDROCHLORIDE 10 MG: 5 INJECTION INTRAMUSCULAR; INTRAVENOUS at 15:14

## 2020-01-25 RX ADMIN — METOCLOPRAMIDE HYDROCHLORIDE 10 MG: 5 INJECTION INTRAMUSCULAR; INTRAVENOUS at 23:22

## 2020-01-25 RX ADMIN — DIPHENHYDRAMINE HYDROCHLORIDE 25 MG: 50 INJECTION, SOLUTION INTRAMUSCULAR; INTRAVENOUS at 15:14

## 2020-01-25 RX ADMIN — DIPHENHYDRAMINE HYDROCHLORIDE 25 MG: 50 INJECTION, SOLUTION INTRAMUSCULAR; INTRAVENOUS at 08:25

## 2020-01-25 RX ADMIN — SODIUM CHLORIDE 1000 ML: 0.9 INJECTION, SOLUTION INTRAVENOUS at 10:08

## 2020-01-25 RX ADMIN — SODIUM CHLORIDE 125 ML/HR: 0.9 INJECTION, SOLUTION INTRAVENOUS at 02:31

## 2020-01-25 RX ADMIN — KETOROLAC TROMETHAMINE 30 MG: 30 INJECTION, SOLUTION INTRAMUSCULAR at 08:26

## 2020-01-25 RX ADMIN — TOPIRAMATE 25 MG: 25 TABLET, FILM COATED ORAL at 08:25

## 2020-01-25 RX ADMIN — CYCLOBENZAPRINE HYDROCHLORIDE 5 MG: 10 TABLET, FILM COATED ORAL at 15:13

## 2020-01-25 RX ADMIN — CYCLOBENZAPRINE HYDROCHLORIDE 5 MG: 10 TABLET, FILM COATED ORAL at 08:25

## 2020-01-25 RX ADMIN — SODIUM CHLORIDE 1000 MG: 0.9 INJECTION, SOLUTION INTRAVENOUS at 10:08

## 2020-01-25 RX ADMIN — KETOROLAC TROMETHAMINE 30 MG: 30 INJECTION, SOLUTION INTRAMUSCULAR at 15:13

## 2020-01-25 NOTE — ASSESSMENT & PLAN NOTE
· This is likely related to migraine  · However, the quality of migraine has changed within the past 2 months    · Patient now has headaches every day with worsening intensity  · Patient was started on Topamax  · Currently patient is being given IV steroids, IV magnesium, Depacon, Toradol, and Benadryl  · MRI unremarkable  · Neurology consult pending

## 2020-01-25 NOTE — PROGRESS NOTES
Progress Note - Nel Linder 1980, 45 y o  female MRN: [id number]    Unit/Bed#: -01 Encounter: [id number]    Primary Care Provider: Ezio García DO   Date and time admitted to hospital: 1/24/2020  2:41 AM        * Intractable headache  Assessment & Plan  · This is likely related to migraine  · However, the quality of migraine has changed within the past 2 months  · Patient now has headaches every day with worsening intensity  · Patient was started on Topamax  · Currently patient is being given IV steroids, IV magnesium, Depacon, Toradol, and Benadryl  · MRI unremarkable  · Neurology consult pending    Postural dizziness with presyncope  Assessment & Plan  Patient also reported several presyncopal episodes  Unknown etiology at this time, but awaiting for ultrasound of the carotids  Will also provide hydration  This may be a component of a complicated migraine  Leukocytosis  Assessment & Plan  · Noted mild leukocytosis  · Likely reactive due to recent use of prednisone  Sinus bradycardia  Assessment & Plan  · Noted bradycardia  · Patient does report occasional palpitations  · TSH pending        VTE Pharmacologic Prophylaxis:   Pharmacologic: Low risk ambulatory  Mechanical VTE Prophylaxis in Place: Yes    Patient Centered Rounds: I have performed bedside rounds with nursing staff today  Discussions with Specialists or Other Care Team Provider:  Await neurology recommendations    Education and Discussions with Family / Patient:  Patient    Time Spent for Care: 30 minutes  More than 50% of total time spent on counseling and coordination of care as described above      Current Length of Stay: 0 day(s)    Current Patient Status: Observation   Certification Statement: Ongoing treatment of migraine symptoms await Neurology recommendations possible discharge later today    Discharge Plan:  Await neurology recommendations    Code Status: Level 1 - Full Code      Subjective:   Patient reporting ongoing frontal migrated his symptoms reading them 2 out of 10 constant with associated light sensitivity sound sensitivity and heightened smell  Patient has some hesitancy about going home as with all medications of her giving her in a for symptoms come back she be right back where she started  Patient made aware will get neurology recommendations in if they can get her on a sound regiment she should could still discharge home later today which patient is very open to  Objective:     Vitals:   Temp (24hrs), Av 5 °F (36 9 °C), Min:98 2 °F (36 8 °C), Max:98 8 °F (37 1 °C)    Temp:  [98 2 °F (36 8 °C)-98 8 °F (37 1 °C)] 98 2 °F (36 8 °C)  HR:  [50-65] 50  Resp:  [18] 18  BP: (119-155)/(66-74) 127/66  SpO2:  [96 %-99 %] 96 %  Body mass index is 33 23 kg/m²  Input and Output Summary (last 24 hours): Intake/Output Summary (Last 24 hours) at 2020 1144  Last data filed at 2020 0601  Gross per 24 hour   Intake 1779 17 ml   Output 500 ml   Net 1279 17 ml       Physical Exam:     Physical Exam   Constitutional: She is oriented to person, place, and time  She appears well-developed and well-nourished  HENT:   Head: Normocephalic and atraumatic  Eyes: Conjunctivae and EOM are normal    Neck: Normal range of motion  Cardiovascular: Normal rate, regular rhythm and normal heart sounds  Pulmonary/Chest: Effort normal and breath sounds normal    Abdominal: Soft  Bowel sounds are normal    Musculoskeletal: Normal range of motion  Neurological: She is alert and oriented to person, place, and time  Skin: Skin is warm and dry  Psychiatric: She has a normal mood and affect   Her behavior is normal          Additional Data:     Labs:    Results from last 7 days   Lab Units 20  0321   WBC Thousand/uL 10 94*   HEMOGLOBIN g/dL 11 6   HEMATOCRIT % 37 4   PLATELETS Thousands/uL 311   NEUTROS PCT % 79*   LYMPHS PCT % 13*   MONOS PCT % 8   EOS PCT % 0     Results from last 7 days   Lab Units 01/24/20  0321   SODIUM mmol/L 143   POTASSIUM mmol/L 3 6   CHLORIDE mmol/L 106   CO2 mmol/L 26   BUN mg/dL 15   CREATININE mg/dL 0 80   ANION GAP mmol/L 11   CALCIUM mg/dL 8 7   ALBUMIN g/dL 3 6   TOTAL BILIRUBIN mg/dL 0 20   ALK PHOS U/L 100   ALT U/L 55   AST U/L 24   GLUCOSE RANDOM mg/dL 141*                           * I Have Reviewed All Lab Data Listed Above  * Additional Pertinent Lab Tests Reviewed: [facility] Admission Reviewed    Imaging:    Imaging Reports Reviewed Today Include:  MRI as mentioned above      Recent Cultures (last 7 days):           Last 24 Hours Medication List:     Current Facility-Administered Medications:  cyclobenzaprine 5 mg Oral TID Namrata Alcocer MD    diphenhydrAMINE 25 mg Intravenous Q8H Namrata Alcocer MD    ketorolac 30 mg Intravenous Q8H Namrata Alcocer MD    lidocaine 2 patch Topical Daily Namrata Alcocer MD    magnesium sulfate 2 g Intravenous Daily Namrata Alcocer MD    meclizine 25 mg Oral Q8H PRN Shane Ponce MD    methylPREDNISolone sodium succinate 1,000 mg Intravenous Daily Namrata Alcocer MD Last Rate: 1,000 mg (01/25/20 1008)   metoclopramide 10 mg Intravenous Q8H Namrata Alcocer MD    sodium chloride 1,000 mL Intravenous Daily Namrata Alcocer MD Last Rate: 1,000 mL (01/25/20 1008)   sodium chloride 125 mL/hr Intravenous Continuous Shane Ponce MD Last Rate: 125 mL/hr (01/25/20 0231)   topiramate 25 mg Oral BID Shane Ponce MD    valproate sodium (DEPACON) IV bolus 1,000 mg Intravenous Daily Namrata Alcocer MD Last Rate: 1,000 mg (01/25/20 7011)        Today, Patient Was Seen By: LAKSHMI Cortez    ** Please Note: Dictation voice to text software may have been used in the creation of this document   **

## 2020-01-25 NOTE — UTILIZATION REVIEW
Initial Clinical Review    Admission: Date/Time/Statement: Observation 1/24 @ 0627  Orders Placed This Encounter   Procedures    Place in Observation     Standing Status:   Standing     Number of Occurrences:   1     Order Specific Question:   Admitting Physician     Answer:   Aixa Rico     Order Specific Question:   Level of Care     Answer:   Med Surg [16]     ED Arrival Information     Expected Arrival Acuity Means of Arrival Escorted By Service Admission Type    - 1/24/2020 02:35 Emergent [facility] Medicine Emergency    Arrival Complaint    chest pain        Chief Complaint   Patient presents with    Chest Pain     pt d/c from [facility] earlier for migraines and near syncopal episodes; pt currently c/o intermittant chest heaviness and pressure with sob     Assessment/Plan:   40y Female to ED presents with intractable headache with strong photophobia and phonophobia as well as dizziness  Pt reports worsening of headaches and increased frequency x 4 months  PMH of headaches x5 yr but now having daily for past several months  Outpatient MRI ordered by Neurology but not yet done  She was started on Topamax without any relief  Admit Observation level of care for Intractable headache, Sinus Renzo and Leukocytosis  Likely migraine  Start Imitrex, MRI Brain and Neurology consult  Check thyroid and occasional palpitations with noted bradycardia  EKG is sinus bradycardia  1/25 Progress notes;  Ongoing treatment of migraine symptoms await Neurology recommendations possible discharge later today    ED Triage Vitals   Temperature Pulse Respirations Blood Pressure SpO2   01/24/20 0247 01/24/20 0247 01/24/20 0247 01/24/20 0247 01/24/20 0247   97 8 °F (36 6 °C) 75 20 165/91 99 %      Temp Source Heart Rate Source Patient Position - Orthostatic VS BP Location FiO2 (%)   01/24/20 0247 01/24/20 0247 01/24/20 1217 01/24/20 0330 --   Oral Monitor Lying Left arm       Pain Score       01/24/20 0313       4 Wt Readings from Last 1 Encounters:   01/24/20 85 1 kg (187 lb 9 8 oz)     Additional Vital Signs:   /25/20 07:59:47  98 2 °F (36 8 °C)  50Abnormal   18  127/66  86  96 %  --    01/24/20 22:42:45  98 8 °F (37 1 °C)  65  18  119/71  87  96 %  --    01/24/20 1217  --  64  --  155/74  106  99 %  None (Room air)    01/24/20 0930  --  47Abnormal   16  134/58  --  100 %  --    01/24/20 0900  --  60  20  114/60  --  98 %  --    01/24/20 0715  --  54Abnormal   20  108/62  --  99 %  --    01/24/20 0530  --  54Abnormal   19  115/66  --  98 %  None (Room air)      Pertinent Labs/Diagnostic Test Results:   1/24 CT Head - No acute intracranial abnormality      1/24 VAS carotid complete study -     1/24 MRI Brain - Normal    1/24 EKG - Sinus bradycardia        Results from last 7 days   Lab Units 01/24/20  0321   WBC Thousand/uL 10 94*   HEMOGLOBIN g/dL 11 6   HEMATOCRIT % 37 4   PLATELETS Thousands/uL 311   NEUTROS ABS Thousands/µL 8 54*         Results from last 7 days   Lab Units 01/24/20  0321   SODIUM mmol/L 143   POTASSIUM mmol/L 3 6   CHLORIDE mmol/L 106   CO2 mmol/L 26   ANION GAP mmol/L 11   BUN mg/dL 15   CREATININE mg/dL 0 80   EGFR ml/min/1 73sq m 93   CALCIUM mg/dL 8 7   MAGNESIUM mg/dL 1 8     Results from last 7 days   Lab Units 01/24/20  0321   AST U/L 24   ALT U/L 55   ALK PHOS U/L 100   TOTAL PROTEIN g/dL 8 1   ALBUMIN g/dL 3 6   TOTAL BILIRUBIN mg/dL 0 20   BILIRUBIN DIRECT mg/dL 0 05         Results from last 7 days   Lab Units 01/24/20  0321   GLUCOSE RANDOM mg/dL 141*     Results from last 7 days   Lab Units 01/24/20  0446   TROPONIN I ng/mL <0 02       Results from last 7 days   Lab Units 01/24/20  1001   CLARITY UA  Cloudy   COLOR UA  Yellow   SPEC GRAV UA  1 025   PH UA  6 5   GLUCOSE UA mg/dl Negative   KETONES UA mg/dl Negative   BLOOD UA  Negative   PROTEIN UA mg/dl Negative   NITRITE UA  Negative   BILIRUBIN UA  Negative   UROBILINOGEN UA E U /dl 0 2   LEUKOCYTES UA  Negative     ED Treatment: Medication Administration from 01/24/2020 0235 to 01/24/2020 1446       Date/Time Order Dose Route Action     01/24/2020 0449 diphenhydrAMINE (BENADRYL) injection 50 mg 50 mg Intravenous Given     01/24/2020 0448 ketorolac (TORADOL) injection 30 mg 30 mg Intravenous Given     01/24/2020 0446 ondansetron (ZOFRAN) injection 4 mg 4 mg Intravenous Given     01/24/2020 0451 Ketamine HCl 20 mg 20 mg Intravenous Given     01/24/2020 1207 meclizine (ANTIVERT) tablet 25 mg 25 mg Oral Given     01/24/2020 1207 metoclopramide (REGLAN) tablet 10 mg 10 mg Oral Given     01/24/2020 1217 SUMAtriptan (IMITREX) tablet 50 mg 50 mg Oral Given     01/24/2020 1217 sodium chloride 0 9 % infusion 125 mL/hr Intravenous New Bag        Past Medical History:   Diagnosis Date    Ear infection     Migraine      Present on Admission:  **None**      Admitting Diagnosis: Migraine [G43 909]  Chest pain [R07 9]  Age/Sex: 45 y o  female     Admission Orders:  IP CONSULT TO NEUROLOGY    Scheduled Medications:  Medications:  cyclobenzaprine 5 mg Oral TID   diphenhydrAMINE 25 mg Intravenous Q8H   ketorolac 30 mg Intravenous Q8H   lidocaine 2 patch Topical Daily   magnesium sulfate 2 g Intravenous Daily   methylPREDNISolone sodium succinate 1,000 mg Intravenous Daily   metoclopramide 10 mg Intravenous Q8H   sodium chloride 1,000 mL Intravenous Daily   topiramate 25 mg Oral BID   valproate sodium (DEPACON) IV bolus 1,000 mg Intravenous Daily     Continuous IV Infusions:  sodium chloride 125 mL/hr Intravenous Continuous     PRN Meds:  meclizine 25 mg Oral Q8H PRN     Network Utilization Review Department  [email or web address]  ATTENTION: Please call with any questions or concerns to [phone] and carefully listen to the prompts so that you are directed to the right person   All voicemails are confidential   Maria Pettit all requests for admission clinical reviews, approved or denied determinations and any other requests to dedicated fax number below belonging to the campus where the patient is receiving treatment   List of dedicated fax numbers for the Facilities:  [address] DENIALS (Administrative/Medical Necessity) [phone]   [address] (Maternity/NICU/Pediatrics) [phone]   [facility] [phone]   Fortino Peralta [phone]   Rickie Blair [phone]   Kya Hernandez [phone]   [address] [place] [address] [phone]   [facility]  [phone]   [address] [facility]  [address] [facility] [address] [facility] [phone]

## 2020-01-25 NOTE — PROGRESS NOTES
PATIENT NAME: Sumanth Mullins OF BIRTH: 1980  MEDICAL RECORD NUMBER: [id number]  Neurology Follow up Note     Following patient for: status migraionosus  Overnight Events: None    Subjective: Patient feels significantly today - pain down to a 2/10, still dizzy/woozy  12 point ROS negative for any changes  Scheduled Meds:  Current Facility-Administered Medications:  cyclobenzaprine 5 mg Oral TID King Garcia MD    diphenhydrAMINE 25 mg Intravenous Q8H King Garcia MD    ketorolac 30 mg Intravenous Q8H King Garcia MD    lidocaine 2 patch Topical Daily King Garcia MD    magnesium sulfate 2 g Intravenous Daily King Garcia MD    meclizine 25 mg Oral Q8H PRN Elias Duncan MD    methylPREDNISolone sodium succinate 1,000 mg Intravenous Daily King Garcia MD Last Rate: 1,000 mg (01/25/20 1008)   metoclopramide 10 mg Intravenous Q8H King Garcia MD    sodium chloride 1,000 mL Intravenous Daily King Garcia MD Last Rate: 1,000 mL (01/25/20 1008)   sodium chloride 125 mL/hr Intravenous Continuous Elias Duncan MD Last Rate: 125 mL/hr (01/25/20 0231)   topiramate 25 mg Oral BID Elias Duncan MD    valproate sodium (DEPACON) IV bolus 1,000 mg Intravenous Daily King Garcia MD Last Rate: 1,000 mg (01/25/20 8315)     Continuous Infusions:  sodium chloride 125 mL/hr Last Rate: 125 mL/hr (01/25/20 0231)     PRN Meds: meclizine      Objective  /80   Pulse (!) 52   Temp 99 5 °F (37 5 °C)   Resp 18   Ht 5' 3" (1 6 m)   Wt 85 1 kg (187 lb 9 8 oz)   LMP 01/11/2020 (Approximate)   SpO2 96%   BMI 33 23 kg/m²   GEN: Comfortable, NAD  HEENT: NC/AT  Anicteric  PPC  MMM   CVS: RRR  S1S2  No M/R/G    LUNGS: B/L entry, no wheezes, rhonchi or rales  EXT: B/L pulses, no edema  Mental Status: The patient was awake, alert, attentive, oriented to person, place, and time  Cranial Nerves:   CN 2-12 intact grossly  Motor Examination:   Grossly full strength throughout            Coordination: No dysmetria noted  Sensory: Normal sensation to light touch b/l UE/LE  Gait: deferred  No results found for this or any previous visit (from the past 24 hour(s))  A/P  45 y o  female with status migrainosus, improving with aggressive medical therapy  Will cut back on flexeril and benadryl to see if this helps with dizziness  Continue rest of meds and rest      Plan for discharge tomorrow if stable with prednisone taper 60 mg daily x 2 days, 50 mg daily x 2 days, 40 mg daily x 2 days, 30 mg daily x 2 days, 20 mg daily x 2 days, 10 mg daily x 2 days       Can be discharged 1/26/20 AM

## 2020-01-26 VITALS
WEIGHT: 187.61 LBS | HEIGHT: 63 IN | HEART RATE: 39 BPM | RESPIRATION RATE: 18 BRPM | TEMPERATURE: 99 F | DIASTOLIC BLOOD PRESSURE: 76 MMHG | OXYGEN SATURATION: 95 % | BODY MASS INDEX: 33.24 KG/M2 | SYSTOLIC BLOOD PRESSURE: 149 MMHG

## 2020-01-26 PROCEDURE — 99217 PR OBSERVATION CARE DISCHARGE MANAGEMENT: CPT | Performed by: NURSE PRACTITIONER

## 2020-01-26 RX ORDER — PREDNISONE 20 MG/1
TABLET ORAL
Qty: 21 TABLET | Refills: 0 | Status: SHIPPED | OUTPATIENT
Start: 2020-01-26 | End: 2020-02-11

## 2020-01-26 RX ORDER — TOPIRAMATE 50 MG/1
50 TABLET, FILM COATED ORAL 2 TIMES DAILY
Qty: 60 TABLET | Refills: 0 | Status: SHIPPED | OUTPATIENT
Start: 2020-01-26 | End: 2020-02-27 | Stop reason: SDUPTHER

## 2020-01-26 RX ORDER — DIAZEPAM 5 MG/1
2.5 TABLET ORAL ONCE
Status: COMPLETED | OUTPATIENT
Start: 2020-01-26 | End: 2020-01-26

## 2020-01-26 RX ORDER — DIAZEPAM 5 MG/1
2.5 TABLET ORAL EVERY 6 HOURS PRN
Qty: 10 TABLET | Refills: 0 | Status: SHIPPED | OUTPATIENT
Start: 2020-01-26 | End: 2020-01-31 | Stop reason: SDUPTHER

## 2020-01-26 RX ADMIN — SODIUM CHLORIDE 125 ML/HR: 0.9 INJECTION, SOLUTION INTRAVENOUS at 04:37

## 2020-01-26 RX ADMIN — LIDOCAINE 2 PATCH: 50 PATCH TOPICAL at 08:49

## 2020-01-26 RX ADMIN — METOCLOPRAMIDE HYDROCHLORIDE 10 MG: 5 INJECTION INTRAMUSCULAR; INTRAVENOUS at 08:50

## 2020-01-26 RX ADMIN — KETOROLAC TROMETHAMINE 30 MG: 30 INJECTION, SOLUTION INTRAMUSCULAR at 08:50

## 2020-01-26 RX ADMIN — MECLIZINE HYDROCHLORIDE 25 MG: 25 TABLET ORAL at 09:23

## 2020-01-26 RX ADMIN — MAGNESIUM SULFATE HEPTAHYDRATE 2 G: 40 INJECTION, SOLUTION INTRAVENOUS at 08:49

## 2020-01-26 RX ADMIN — TOPIRAMATE 25 MG: 25 TABLET, FILM COATED ORAL at 08:50

## 2020-01-26 RX ADMIN — DIAZEPAM 2.5 MG: 5 TABLET ORAL at 12:33

## 2020-01-26 NOTE — ASSESSMENT & PLAN NOTE
· This is likely related to migraine  · However, the quality of migraine has changed within the past 2 months    · Patient now has headaches every day with worsening intensity  · Patient was started on Topamax  · Currently patient is being given IV steroids, IV magnesium, Depacon, Toradol, and Benadryl  · Patient kept in setting of some residual dizziness given improvement patient can discharge on a steroid taper and continue management on the outpatient setting with Neurology  · MRI unremarkable  ·

## 2020-01-26 NOTE — ASSESSMENT & PLAN NOTE
· Noted bradycardia    · Patient does report occasional palpitations  · TSH actually low does not correlate with patient's bradycardia  · Will however will obtain a T3-T4 if normal patient could have a repeat TSH as an outpatient

## 2020-01-26 NOTE — DISCHARGE SUMMARY
Discharge- Casandra Minaya 1980, 45 y o  female MRN: [id number]    Unit/Bed#: -01 Encounter: [id number]    Primary Care Provider: Zulma Powell DO   Date and time admitted to hospital: 1/24/2020  2:41 AM        * Intractable headache  Assessment & Plan  · This is likely related to migraine  · However, the quality of migraine has changed within the past 2 months  · Patient now has headaches every day with worsening intensity  · Patient was started on Topamax  · Currently patient is being given IV steroids, IV magnesium, Depacon, Toradol, and Benadryl  · Patient kept in setting of some residual dizziness given improvement patient can discharge on a steroid taper and continue management on the outpatient setting with Neurology  · MRI unremarkable  ·     Postural dizziness with presyncope  Assessment & Plan  Patient also reported several presyncopal episodes  Carotid study normal no evidence of stenosis  Patient noticing dizziness when she turns her head to the left could be in vestibular component as patient was given a 1 time dose of Valium 2 5 mg is showed substantial improvement of her dizziness symptoms will give patient a small script for Valium to use along with Antivert further refer patient over for vestibular therapy if symptoms persist patient should return to her ENT        Leukocytosis  Assessment & Plan  · Noted mild leukocytosis  · Likely reactive due to recent use of prednisone  Sinus bradycardia  Assessment & Plan  · Noted bradycardia    · Patient does report occasional palpitations  · TSH actually low does not correlate with patient's bradycardia  · Will however will obtain a T3-T4 if normal patient could have a repeat TSH as an outpatient          Discharging Physician / Practitioner: Charlee Hardy  PCP: Zulma Powell DO  Admission Date:   Admission Orders (From admission, onward)     Ordered        01/24/20 0627  Place in Observation  Once                   Discharge Date: 01/26/20    Resolved Problems  Date Reviewed: 1/25/2020    None          Consultations During Hospital Stay:  · Neurology    Procedures Performed:   · MRI normal study    Significant Findings / Test Results:   · Intractable headache status migrainous  · Dizziness thought to be vestibular in nature complicated by migraine as well  · Sinus bradycardia    Incidental Findings:   · Low TSH patient refused T3-T4 due to being a tough blood draw     Test Results Pending at Discharge (will require follow up): · None     Outpatient Tests Requested:  · TSH [id number]    Complications:  Ongoing migrainous symptoms and dizziness    Reason for Admission:  Intractable headache    Hospital Course:     Delia Payne is a 45 y o  female patient who originally presented to the hospital on 1/24/2020 due to ongoing intractable headache symptoms for 4 months that were worsening  Patient was rating them a 9/10 with strong photophobia phonophobia dizziness  She has been struggling essentially for 5 years put with worsening over the last several months with reports of daily headaches further reports of blurry vision  Patient was admitted started on a headache cocktail neurology consult and MRI was ordered  MRI imaging was normal neurology evaluated started patient on Depacon steroids Reglan Benadryl patient slowed show steady improvement of her migraine his symptoms had residual dizziness noted to have strong symptoms when she turned her head to the left did not respond to meclizine was given Valium which showed significant improvement of her dizziness symptoms patient was thought to have some vestibular symptoms along with her migraine he is symptoms patient was discharged with a steroid taper for her migraine is symptom with a neurology referral upon discharge  Further was given some Antivert a small script Valium and a referral to vestibular therapy    Incidental finding of a low TSH patient did refuse a T3-T4 in setting of being a tough blood draw all and would follow-up with her PCP  Please see above list of diagnoses and related plan for additional information  Condition at Discharge: good     Discharge Day Visit / Exam:     Subjective:  Patient reports over while reporting significant improvement of her migraine symptoms on the current regiment feels great to discharge home after all her dizziness symptoms have improved on Valium  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 in setting of her symptoms  Vitals: Blood Pressure: 149/76 (01/26/20 0803)  Pulse: (!) 39 (01/26/20 0803)  Temperature: 99 °F (37 2 °C) (01/25/20 2345)  Temp Source: Oral (01/24/20 0247)  Respirations: 18 (01/26/20 0803)  Height: 5' 3" (160 cm) (01/24/20 0247)  Weight - Scale: 85 1 kg (187 lb 9 8 oz) (01/24/20 0245)  SpO2: 95 % (01/26/20 0803)  Exam:   Physical Exam   Constitutional: She is oriented to person, place, and time  She appears well-developed and well-nourished  HENT:   Head: Normocephalic and atraumatic  Eyes: Conjunctivae and EOM are normal    Neck: Normal range of motion  Cardiovascular: Normal rate, regular rhythm and normal heart sounds  Pulmonary/Chest: Effort normal and breath sounds normal    Abdominal: Soft  Bowel sounds are normal    Musculoskeletal: Normal range of motion  Neurological: She is alert and oriented to person, place, and time  Skin: Skin is warm and dry  Psychiatric: She has a normal mood and affect  Her behavior is normal        Discussion with Family:   at bedside    Discharge instructions/Information to patient and family:   See after visit summary for information provided to patient and family  Provisions for Follow-Up Care:  See after visit summary for information related to follow-up care and any pertinent home health orders        Disposition:     Home    For Discharges to [facility]F:   · Not Applicable to this Patient - Not Applicable to this Patient    Planned Readmission:  None     Discharge Statement:  I spent 40 minutes discharging the patient  This time was spent on the day of discharge  I had direct contact with the patient on the day of discharge  Greater than 50% of the total time was spent examining patient, answering all patient questions, arranging and discussing plan of care with patient as well as directly providing post-discharge instructions  Additional time then spent on discharge activities  Discharge Medications:  See after visit summary for reconciled discharge medications provided to patient and family        ** Please Note: This note has been constructed using a voice recognition system **

## 2020-01-26 NOTE — ASSESSMENT & PLAN NOTE
Patient also reported several presyncopal episodes    Carotid study normal no evidence of stenosis  Patient noticing dizziness when she turns her head to the left could be in vestibular component as patient was given a 1 time dose of Valium 2 5 mg is showed substantial improvement of her dizziness symptoms will give patient a small script for Valium to use along with Antivert further refer patient over for vestibular therapy if symptoms persist patient should return to her ENT

## 2020-01-31 ENCOUNTER — OFFICE VISIT (OUTPATIENT)
Dept: FAMILY MEDICINE CLINIC | Facility: CLINIC | Age: 40
End: 2020-01-31
Payer: COMMERCIAL

## 2020-01-31 VITALS
HEART RATE: 92 BPM | DIASTOLIC BLOOD PRESSURE: 78 MMHG | OXYGEN SATURATION: 99 % | HEIGHT: 63 IN | TEMPERATURE: 98.6 F | BODY MASS INDEX: 34.55 KG/M2 | WEIGHT: 195 LBS | SYSTOLIC BLOOD PRESSURE: 124 MMHG

## 2020-01-31 DIAGNOSIS — Z76.89 ENCOUNTER TO ESTABLISH CARE: Primary | ICD-10-CM

## 2020-01-31 DIAGNOSIS — R55 POSTURAL DIZZINESS WITH PRESYNCOPE: ICD-10-CM

## 2020-01-31 DIAGNOSIS — R42 POSTURAL DIZZINESS WITH PRESYNCOPE: ICD-10-CM

## 2020-01-31 DIAGNOSIS — F41.9 ANXIETY: ICD-10-CM

## 2020-01-31 DIAGNOSIS — Z91.89 AT RISK FOR NUTRITION DEFICIENCY: ICD-10-CM

## 2020-01-31 PROCEDURE — 99204 OFFICE O/P NEW MOD 45 MIN: CPT | Performed by: NURSE PRACTITIONER

## 2020-01-31 PROCEDURE — 3008F BODY MASS INDEX DOCD: CPT | Performed by: NURSE PRACTITIONER

## 2020-01-31 PROCEDURE — 1111F DSCHRG MED/CURRENT MED MERGE: CPT | Performed by: NURSE PRACTITIONER

## 2020-01-31 RX ORDER — METOCLOPRAMIDE 5 MG/1
5 TABLET ORAL 4 TIMES DAILY
COMMUNITY
End: 2020-01-31 | Stop reason: ALTCHOICE

## 2020-01-31 RX ORDER — DIAZEPAM 5 MG/1
2.5 TABLET ORAL EVERY 6 HOURS PRN
Qty: 10 TABLET | Refills: 0 | Status: SHIPPED | OUTPATIENT
Start: 2020-01-31 | End: 2020-02-03 | Stop reason: SDUPTHER

## 2020-01-31 RX ORDER — PROCHLORPERAZINE MALEATE 10 MG
10 TABLET ORAL EVERY 6 HOURS PRN
Qty: 20 TABLET | Refills: 0 | Status: SHIPPED | OUTPATIENT
Start: 2020-01-31 | End: 2020-02-05 | Stop reason: ALTCHOICE

## 2020-01-31 RX ORDER — BUSPIRONE HYDROCHLORIDE 5 MG/1
5 TABLET ORAL 3 TIMES DAILY
Qty: 30 TABLET | Refills: 0 | Status: SHIPPED | OUTPATIENT
Start: 2020-01-31 | End: 2020-02-05 | Stop reason: SDUPTHER

## 2020-01-31 NOTE — PATIENT INSTRUCTIONS
Look up eppley maneuvers or nathalia briones pike maneuvwers to reset crystals  Continue valium as needed   Decrease Topamax to 25 mg twice a day continue to monitor symptoms   make sure you had getting enough fluid hydration and also eating well     Dizziness   WHAT YOU NEED TO KNOW:   Dizziness is a feeling of being off balance or unsteady  Common causes of dizziness are an inner ear fluid imbalance or a lack of oxygen in your blood  Dizziness may be acute (lasts 3 days or less) or chronic (lasts longer than 3 days)  You may have dizzy spells that last from seconds to a few hours  DISCHARGE INSTRUCTIONS:   Return to the emergency department if:   · You have a headache and a stiff neck  · You have shaking chills and a fever  · You vomit over and over with no relief  · Your vomit or bowel movements are red or black  · You have pain in your chest, back, or abdomen  · You have numbness, especially in your face, arms, or legs  · You have trouble moving your arms or legs  · You are confused  Contact your healthcare provider if:   · You have a fever  · Your symptoms do not get better with treatment  · You have questions or concerns about your condition or care  Manage your symptoms:   · Do not drive  or operate heavy machinery when you are dizzy  · Get up slowly  from sitting or lying down  · Drink plenty of liquids  Liquids help prevent dehydration  Ask how much liquid to drink each day and which liquids are best for you  Follow up with your healthcare provider as directed:  Write down your questions so you remember to ask them during your visits  © 2017 [employer] Information is for End User's use only and may not be sold, redistributed or otherwise used for commercial purposes  All illustrations and images included in CareNotes® are the copyrighted property of [employer] , [employer]  or [employer]  The above information is an  only   It is not intended as medical advice for individual conditions or treatments  Talk to your doctor, nurse or pharmacist before following any medical regimen to see if it is safe and effective for you

## 2020-01-31 NOTE — PROGRESS NOTES
Assessment/Plan:  BMI Counseling: There is no height or weight on file to calculate BMI  The BMI is above normal  Nutrition recommendations include decreasing portion sizes, encouraging healthy choices of fruits and vegetables, decreasing fast food intake, consuming healthier snacks, limiting drinks that contain sugar, moderation in carbohydrate intake, increasing intake of lean protein, reducing intake of saturated and trans fat and reducing intake of cholesterol  Exercise recommendations include exercising 3-5 times per week  No pharmacotherapy was ordered  Encounter to establish care  Intake completed  Labs ordered  Patient has not had a bariatric follow-up  Referral made to bariatric  Labs ordered to check her vitamin-D deficiency  Anxiety  BuSpar ordered 5 mg 3 times a day  Patient may take up to 10 mg 3 times a day if needed  Encouraged to get enough rest, hydration, nutrition  At risk for nutrition deficiency  Post bariatric surgery  Patient has not had a workup since then  Will refer to weight management  Will get labs  Postural dizziness with presyncope  Patient continues to episodes of dizziness  Has a vestibular therapy on Monday  Continue Valium as needed  Patient advised to use this and alternate with Compazine  Maintain safety  Information given on Epley maneuvers  Discussed ordering echocardiogram   Patient wants to wait until her symptoms of dizziness and numbness have been resolved, then she will address  the syncope  Will continue to monitor  Intractable headache  Continue Topamax, decreased to 25 mg twice a day  Will continue to monitor symptoms  Will evaluate effectiveness of this medication, may need to change if some of her symptoms have been resolved with decreased dosage  Problem List Items Addressed This Visit        Cardiovascular and Mediastinum    Postural dizziness with presyncope     Patient continues to episodes of dizziness    Has a vestibular therapy on Monday  Continue Valium as needed  Patient advised to use this and alternate with Compazine  Maintain safety  Information given on Epley maneuvers  Discussed ordering echocardiogram   Patient wants to wait until her symptoms of dizziness and numbness have been resolved, then she will address  the syncope  Will continue to monitor  Relevant Medications    prochlorperazine (COMPAZINE) 10 mg tablet       Other    Encounter to establish care - Primary     Intake completed  Labs ordered  Patient has not had a bariatric follow-up  Referral made to bariatric  Labs ordered to check her vitamin-D deficiency  Anxiety     BuSpar ordered 5 mg 3 times a day  Patient may take up to 10 mg 3 times a day if needed  Encouraged to get enough rest, hydration, nutrition  Relevant Medications    busPIRone (BUSPAR) 5 mg tablet    Other Relevant Orders    TSH, 3rd generation with Free T4 reflex    Vitamin D 25 hydroxy    At risk for nutrition deficiency     Post bariatric surgery  Patient has not had a workup since then  Will refer to weight management  Will get labs  Relevant Orders    Ambulatory referral to Nutrition Services    Vitamin D 25 hydroxy    Vitamin A    Folate    Zinc    Vitamin B12    Vitamin B6            Subjective:      Patient ID: Mynor Carlson is a 45 y o  female  Patient is here to establish care  Last primary care provider-Ronald  Past medical history-   Past surgical history-   10 abdominal surgeries  gallbladder removal   Cecum surgery  Gastric sleeve  In [place] about 2 years ago has not had any follow-up recently    Appendix removal   Social history  - x 19  Kids- 5 kids , 2 biological  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[employer]   Smoker-  Non  Alcohol- none  Other drugs-  None  Last diagnostic labs screening- with recent hospitalization  Dental exam- 2018  Eyes- current  Mammogram-   Not indicated  Cervical cancer screening- unknown    Current concerns- patient is very frustrated because she was seen in the emergency room and was sent home, her symptoms persist   Still has a lot of dizziness  Still has problems with balance  Patient does have a follow-up with vestibular therapy on Monday  Patient was started on 50 mg of Topamax twice a day  The following portions of the patient's history were reviewed and updated as appropriate: allergies, current medications, past family history, past medical history, past social history, past surgical history and problem list     Review of Systems   Constitutional: Negative  Negative for chills and fever  HENT: Negative  Eyes: Negative  Respiratory: Negative  Cardiovascular: Negative  Gastrointestinal: Negative  Negative for nausea and vomiting  Endocrine: Negative  Genitourinary: Negative  Musculoskeletal: Negative  Skin: Negative  Allergic/Immunologic: Negative  Neurological: Positive for dizziness, syncope, speech difficulty, weakness, light-headedness, numbness (face and legs) and headaches (x 5years  Daily   es excedrin, light sensitivity)  Psychiatric/Behavioral: Positive for decreased concentration and sleep disturbance (oernights, 24 hrs)  The patient is nervous/anxious (anxiety with ocd  Theraphy and meds  buspar  )  Objective:      /78   Pulse 92   Temp 98 6 °F (37 °C) (Tympanic)   Ht 5' 3" (1 6 m)   Wt 88 5 kg (195 lb)   LMP 01/11/2020 (Approximate)   SpO2 99%   BMI 34 54 kg/m²     118/82       Physical Exam   Constitutional: She is oriented to person, place, and time  She appears well-developed and well-nourished  HENT:   Head: Normocephalic and atraumatic  Right Ear: External ear normal    Left Ear: External ear normal    Eyes: Pupils are equal, round, and reactive to light  Neck: Normal range of motion  Cardiovascular: Normal rate and regular rhythm     Pulmonary/Chest: Effort normal    Abdominal: Soft  Bowel sounds are normal    Musculoskeletal: Normal range of motion  Neurological: She is alert and oriented to person, place, and time  Skin: Skin is warm and dry  Psychiatric: She has a normal mood and affect  Nursing note and vitals reviewed          Labs:    Lab Results   Component Value Date    WBC 10 94 (H) 01/24/2020    HGB 11 6 01/24/2020    HCT 37 4 01/24/2020    MCV 79 (L) 01/24/2020     01/24/2020     Lab Results   Component Value Date    K 3 6 01/24/2020     01/24/2020    CO2 26 01/24/2020    BUN 15 01/24/2020    CREATININE 0 80 01/24/2020    CALCIUM 8 7 01/24/2020    AST 24 01/24/2020    ALT 55 01/24/2020    ALKPHOS 100 01/24/2020    EGFR 93 01/24/2020     Lab Results   Component Value Date    CALCIUM 8 7 01/24/2020    K 3 6 01/24/2020    CO2 26 01/24/2020     01/24/2020    BUN 15 01/24/2020    CREATININE 0 80 01/24/2020

## 2020-02-03 ENCOUNTER — EVALUATION (OUTPATIENT)
Dept: PHYSICAL THERAPY | Facility: CLINIC | Age: 40
End: 2020-02-03
Payer: COMMERCIAL

## 2020-02-03 DIAGNOSIS — R55 POSTURAL DIZZINESS WITH PRESYNCOPE: ICD-10-CM

## 2020-02-03 DIAGNOSIS — R42 POSTURAL DIZZINESS WITH PRESYNCOPE: ICD-10-CM

## 2020-02-03 PROCEDURE — 97140 MANUAL THERAPY 1/> REGIONS: CPT | Performed by: PHYSICAL THERAPIST

## 2020-02-03 PROCEDURE — 97163 PT EVAL HIGH COMPLEX 45 MIN: CPT | Performed by: PHYSICAL THERAPIST

## 2020-02-03 NOTE — PROGRESS NOTES
PT Evaluation     Today's date: 2/3/2020  Patient name: Rickie Lambert  : 1980  MRN: [id number]  Referring provider: Jeanna Collazo  Dx:   Encounter Diagnosis     ICD-10-CM    1  Postural dizziness with presyncope R42 Ambulatory referral to Physical Therapy    R55                   Assessment  Assessment details: Lena Jerez is a 45 y o  Female who presents to therapy with increased dizziness and headache pain  Patient reports occasional migraines that started about 5 years ago, and increased starting 4-6 months ago  Patient reports her current headache pain 2-3/10 and dizziness 6/10 and describes it as a burning/pressure sensation  She also describes increased dizziness with change of position and head turns  She needed min assist to move from waiting room to treatment room due to dizziness and unsteadiness  Patient displays forward head and forward rounded shoulder posture  Patient was unable to perform mCTSIB due to increased dizziness, but was able to stand FTEO for 30 seconds with min sway  Patient displayed increased muscular tone in Upper Trapezius, Levator Scapulae, Rhomboids, Suboccipitals, and Sternocleidomastoids with palpation  Patient was educated and demonstrated appropriate use of Theracane for HEP  Discussed with patient the benefits of behavioral health services to alleviate patients stress level with contact information provided  A straight cane was given to decrease risk of falls, increase patient's base of support, improve patient confidence, and increase patient safety  Patient referred to neurologist and behavioral health services  Patient will benefit from skilled PT services to improve posture, decrease headaches and dizziness, and return to prior level of function     Impairments: abnormal coordination, abnormal gait, abnormal muscle tone, activity intolerance, impaired balance, impaired physical strength, lacks appropriate home exercise program, safety issue, poor posture  and poor body mechanics  Other impairment: Headache and dizziness     Symptom irritability: highUnderstanding of Dx/Px/POC: good   Prognosis: good    Goals  STG (4 Weeks)  1  Patient will be independent with basic HEP  2  Patient will be independent with household ambulation  3  Patient will decrease dizziness to 3/10 intensity to allow for functional return to ADLs  4  Patient will decrease headaches to 4x per week to allow for functional return to ADLs  5  Patient will decrease headache duration to 12 hours per episode for functional return to ADLs    LTG (8 weeks)  1  Patient will be independent with complex HEP  2  Patient will be independent with community ambulation  3  Patient will not have headache pain for 7 consecutive days  4  Patient will decrease dizziness to 1/10 intensity to allow for return to work   5  Patient will decrease headaches to 1x per week to allow for return to work  6  Patient will decrease headache duration to 4 hours per episode for return to work    Plan  Patient would benefit from: skilled physical therapy  Referral necessary: Yes  Planned therapy interventions: balance, home exercise program, gait training, manual therapy, neuromuscular re-education, patient education, postural training, strengthening, stretching, therapeutic activities, therapeutic exercise, therapeutic training and flexibility  Frequency: 2x week  Plan of Care beginning date: 2/3/2020  Plan of Care expiration date: 3/30/2020        Subjective Evaluation    History of Present Illness  Mechanism of injury: Patient states that migraines started about 5 years ago and was able to self-treat with excedrin  Roughly 4-6 months ago headaches got worse and were continuous  Numbness/tingling/weakness in UE started about 3 weeks ago and has not gotten better  Patient reports headache pain 2-3/10, and dizziness 6-7/10  Patient reports dizziness that varies at different times     Pain  Current pain rating: 3  At best pain ratin  At worst pain ratin  Quality: burning and pressure  Relieving factors: change in position and rest    Social Support  Steps to enter house: yes  Stairs in house: yes   Lives in: multiple-level home  Lives with: young children    Employment status: not working  Hand dominance: left    Treatments  No previous or current treatments  Patient Goals  Patient goals for therapy: increased strength, improved balance, return to work and decreased pain          Objective     Static Posture     Head  Forward  Shoulders  Rounded  Functional Assessment        Comments  Headache   Frequency:7  Intensity: 2/10 Pain   Duration: Most of the day   Location: Cranial  Sensation: Pressure   Exacerbating Factors: noise, light, change of position, head/eye movements  Relieving Factors:  Supine, dark, quiet      Posture: Forward Rounded Shoulders, Forward Head  Palpation: Increased tone with Bilateral Upper Trapezius, Rhomboids, Bilateral Levator Scapulae, Sternocleidomastoids, and Suboccipitals      Flowsheet Rows      Most Recent Value   PT/OT G-Codes   Current Score  34   Projected Score  72             Precautions:  has a past medical history of Ear infection, Gestational diabetes, and Migraine        Manual                                                                                   Exercise Diary                                                                                                                                                                                                                                                                                      Modalities

## 2020-02-04 ENCOUNTER — TRANSITIONAL CARE MANAGEMENT (OUTPATIENT)
Dept: FAMILY MEDICINE CLINIC | Facility: CLINIC | Age: 40
End: 2020-02-04

## 2020-02-04 PROBLEM — F41.9 ANXIETY: Status: ACTIVE | Noted: 2020-02-04

## 2020-02-04 PROBLEM — Z76.89 ENCOUNTER TO ESTABLISH CARE: Status: ACTIVE | Noted: 2020-02-04

## 2020-02-04 PROBLEM — Z91.89 AT RISK FOR NUTRITION DEFICIENCY: Status: ACTIVE | Noted: 2020-02-04

## 2020-02-04 RX ORDER — DIAZEPAM 5 MG/1
2.5 TABLET ORAL EVERY 6 HOURS PRN
Qty: 10 TABLET | Refills: 0 | Status: SHIPPED | OUTPATIENT
Start: 2020-02-04 | End: 2020-02-05 | Stop reason: SDUPTHER

## 2020-02-04 NOTE — ASSESSMENT & PLAN NOTE
Continue Topamax, decreased to 25 mg twice a day  Will continue to monitor symptoms  Will evaluate effectiveness of this medication, may need to change if some of her symptoms have been resolved with decreased dosage

## 2020-02-04 NOTE — ASSESSMENT & PLAN NOTE
Intake completed  Labs ordered  Patient has not had a bariatric follow-up  Referral made to bariatric  Labs ordered to check her vitamin-D deficiency

## 2020-02-04 NOTE — ASSESSMENT & PLAN NOTE
Patient continues to episodes of dizziness  Has a vestibular therapy on Monday  Continue Valium as needed  Patient advised to use this and alternate with Compazine  Maintain safety  Information given on Epley maneuvers  Discussed ordering echocardiogram   Patient wants to wait until her symptoms of dizziness and numbness have been resolved, then she will address  the syncope  Will continue to monitor

## 2020-02-04 NOTE — ASSESSMENT & PLAN NOTE
BuSpar ordered 5 mg 3 times a day  Patient may take up to 10 mg 3 times a day if needed  Encouraged to get enough rest, hydration, nutrition

## 2020-02-05 ENCOUNTER — OFFICE VISIT (OUTPATIENT)
Dept: FAMILY MEDICINE CLINIC | Facility: CLINIC | Age: 40
End: 2020-02-05
Payer: COMMERCIAL

## 2020-02-05 VITALS
HEART RATE: 78 BPM | OXYGEN SATURATION: 99 % | SYSTOLIC BLOOD PRESSURE: 126 MMHG | HEIGHT: 63 IN | TEMPERATURE: 97.9 F | DIASTOLIC BLOOD PRESSURE: 84 MMHG | WEIGHT: 201 LBS | BODY MASS INDEX: 35.61 KG/M2

## 2020-02-05 DIAGNOSIS — Z13.220 SCREENING FOR LIPID DISORDERS: ICD-10-CM

## 2020-02-05 DIAGNOSIS — Z91.89 AT RISK FOR NUTRITION DEFICIENCY: ICD-10-CM

## 2020-02-05 DIAGNOSIS — R42 POSTURAL DIZZINESS WITH PRESYNCOPE: Primary | ICD-10-CM

## 2020-02-05 DIAGNOSIS — F41.9 ANXIETY: ICD-10-CM

## 2020-02-05 DIAGNOSIS — Z00.00 HEALTHCARE MAINTENANCE: ICD-10-CM

## 2020-02-05 DIAGNOSIS — R55 POSTURAL DIZZINESS WITH PRESYNCOPE: Primary | ICD-10-CM

## 2020-02-05 PROCEDURE — 1036F TOBACCO NON-USER: CPT | Performed by: NURSE PRACTITIONER

## 2020-02-05 PROCEDURE — 3008F BODY MASS INDEX DOCD: CPT | Performed by: NURSE PRACTITIONER

## 2020-02-05 PROCEDURE — 99214 OFFICE O/P EST MOD 30 MIN: CPT | Performed by: NURSE PRACTITIONER

## 2020-02-05 PROCEDURE — 1111F DSCHRG MED/CURRENT MED MERGE: CPT | Performed by: NURSE PRACTITIONER

## 2020-02-05 RX ORDER — BUSPIRONE HYDROCHLORIDE 5 MG/1
5 TABLET ORAL 3 TIMES DAILY
Qty: 90 TABLET | Refills: 3 | Status: SHIPPED | OUTPATIENT
Start: 2020-02-05 | End: 2020-03-16 | Stop reason: ALTCHOICE

## 2020-02-05 RX ORDER — DIAZEPAM 5 MG/1
2.5 TABLET ORAL EVERY 6 HOURS PRN
Qty: 60 TABLET | Refills: 0 | Status: SHIPPED | OUTPATIENT
Start: 2020-02-05 | End: 2020-02-06 | Stop reason: SDUPTHER

## 2020-02-05 NOTE — PATIENT INSTRUCTIONS
Use 5 mg 3 times a day , may take 10 mg  Continue valium  May tylenol, use ice and rest  Continue therapy  Obtain fasting labs, nothing to eat after midnight, may drink water     Tums or pepcid for heartburn

## 2020-02-06 ENCOUNTER — TELEPHONE (OUTPATIENT)
Dept: FAMILY MEDICINE CLINIC | Facility: CLINIC | Age: 40
End: 2020-02-06

## 2020-02-06 ENCOUNTER — OFFICE VISIT (OUTPATIENT)
Dept: PHYSICAL THERAPY | Facility: CLINIC | Age: 40
End: 2020-02-06
Payer: COMMERCIAL

## 2020-02-06 DIAGNOSIS — R55 POSTURAL DIZZINESS WITH PRESYNCOPE: Primary | ICD-10-CM

## 2020-02-06 DIAGNOSIS — R42 POSTURAL DIZZINESS WITH PRESYNCOPE: Primary | ICD-10-CM

## 2020-02-06 PROBLEM — R12 HEARTBURN: Status: ACTIVE | Noted: 2020-02-06

## 2020-02-06 PROBLEM — Z13.220 SCREENING FOR LIPID DISORDERS: Status: ACTIVE | Noted: 2020-02-06

## 2020-02-06 PROCEDURE — 97140 MANUAL THERAPY 1/> REGIONS: CPT | Performed by: PHYSICAL THERAPIST

## 2020-02-06 RX ORDER — DIAZEPAM 5 MG/1
2.5 TABLET ORAL EVERY 6 HOURS PRN
Qty: 60 TABLET | Refills: 0 | Status: SHIPPED | OUTPATIENT
Start: 2020-02-06 | End: 2020-02-11 | Stop reason: ALTCHOICE

## 2020-02-06 RX ORDER — DIAZEPAM 5 MG/1
2.5 TABLET ORAL EVERY 6 HOURS PRN
Qty: 60 TABLET | Refills: 0 | Status: SHIPPED | OUTPATIENT
Start: 2020-02-06 | End: 2020-02-06 | Stop reason: SDUPTHER

## 2020-02-06 NOTE — ASSESSMENT & PLAN NOTE
Patient is taking Valium  Patient also reports that the BuSpar has been helping  Referral made to therapy  List of therapists provided  Patient wants to have a therapist specializes in EMS and trauma  Encouraged to maintain rest, hydration, nutrition  No caffeine or energy drink use  Extensive discussion with patient that she needs to follow therapy, increase strength  Encouraged to use her support systems

## 2020-02-06 NOTE — ASSESSMENT & PLAN NOTE
Patient reports that she has a lot heartburn recently  Advised to use Tums or Pepcid  Advised not to lie down at least and 1 hour after she eats  Decrease caffeine use  Eat yogurt or use probiotic  Will continue to evaluate the need for of PPI

## 2020-02-06 NOTE — ASSESSMENT & PLAN NOTE
Continue therapy  Patient is unable to go back to work until cleared by therapy  May continue to use Valium as needed for dizziness  Advised not to drive until medically cleared  Maintain safety  Will complete FMLA forms patient  Will get echo and cardiac workup done for syncope  Discussed with patient when she is stabilizing with therapy will get the additional workup done    Patient verbalized understanding

## 2020-02-06 NOTE — PROGRESS NOTES
Daily Note     Today's date: 2020  Patient name: Wojciech Monday  : 1980  MRN: [id number]  Referring provider: Carin Dinh  Dx:   Encounter Diagnosis     ICD-10-CM    1  Postural dizziness with presyncope R42     R55                   Subjective: Pt reports current HA 3/10 but dizziness was bad this morning  Switches between spinning dizziness and rocking feeling  Objective: See treatment diary below    Manuals:  TPR Upper trap, levator scap, scalenes, rhomboids, SCM, cervical paraspinals  Reviewed use of theracane for home use    Postural re-ed:  Gentle correction into scapular depression and retraction  Chin tucks    Assessment: Pt tolerated treatment well  Reproduction of dizziness with TPR to L upper trap  Cueing for deep breathing and relaxation techniques during manual therapy  Pt reports feeling better end of session  Demonstrated ability to use theracane correctly at home  Pt would benefit from continued PT to reduce headaches and dizziness  Plan: Continue per plan of care  Precautions:  has a past medical history of Ear infection, Gestational diabetes, and Migraine

## 2020-02-06 NOTE — PROGRESS NOTES
Assessment/Plan:    Depression Screening and Follow-up Plan: Patient's depression screening was positive with a PHQ-2 score of 6  Their PHQ-9 score was 13  Patient assessed for underlying major depression  Brief counseling provided and recommend additional follow-up/re-evaluation next office visit  Depression likely due to other medical condition  Will treat underlying condition  Postural dizziness with presyncope  Continue therapy  Patient is unable to go back to work until cleared by therapy  May continue to use Valium as needed for dizziness  Advised not to drive until medically cleared  Maintain safety  Will complete FMLA forms patient  Will get echo and cardiac workup done for syncope  Discussed with patient when she is stabilizing with therapy will get the additional workup done  Patient verbalized understanding    Anxiety  Patient is taking Valium  Patient also reports that the BuSpar has been helping  Referral made to therapy  List of therapists provided  Patient wants to have a therapist specializes in EMS and trauma  Encouraged to maintain rest, hydration, nutrition  No caffeine or energy drink use  Extensive discussion with patient that she needs to follow therapy, increase strength  Encouraged to use her support systems  At risk for nutrition deficiency  Get labs done  Maintain nutrition  Follow-up with bariatric surgery  Heartburn  Patient reports that she has a lot heartburn recently  Advised to use Tums or Pepcid  Advised not to lie down at least and 1 hour after she eats  Decrease caffeine use  Eat yogurt or use probiotic  Will continue to evaluate the need for of PPI  Problem List Items Addressed This Visit        Cardiovascular and Mediastinum    Postural dizziness with presyncope - Primary     Continue therapy  Patient is unable to go back to work until cleared by therapy  May continue to use Valium as needed for dizziness    Advised not to drive until medically cleared  Maintain safety  Will complete FMLA forms patient  Will get echo and cardiac workup done for syncope  Discussed with patient when she is stabilizing with therapy will get the additional workup done  Patient verbalized understanding         Relevant Medications    diazepam (VALIUM) 5 mg tablet       Other    Anxiety     Patient is taking Valium  Patient also reports that the BuSpar has been helping  Referral made to therapy  List of therapists provided  Patient wants to have a therapist specializes in EMS and trauma  Encouraged to maintain rest, hydration, nutrition  No caffeine or energy drink use  Extensive discussion with patient that she needs to follow therapy, increase strength  Encouraged to use her support systems  Relevant Medications    busPIRone (BUSPAR) 5 mg tablet    Other Relevant Orders    Ambulatory referral to behavioral health therapists    At risk for nutrition deficiency     Get labs done  Maintain nutrition  Follow-up with bariatric surgery  Screening for lipid disorders    Relevant Orders    Lipid panel      Other Visit Diagnoses     Healthcare maintenance        Relevant Orders    CBC and differential    Comprehensive metabolic panel    C-reactive protein            Subjective:      Patient ID: Aliyah Swenson is a 45 y o  female  Patient is here for follow-up regarding dizziness, gait disturbance, aphasia  Patient did see therapy for vestibular imbalance  Reports that it was beneficial and she is going to follow up at least twice a week possibly 3 times  Continues to feel dizzy, continues to feel weak  Continues to have a phase moment  Patient is still unsteady on feet  Did decrease the Topamax at 25 mg twice a day  Reports that she feels her migraine headaches upcoming back  Would like to increase the dosage back to 50 mg bid  Still has little bit of numbness and tingling on extremities    Has not had any syncopal episodes since her last office visit  Patient also reports a lot of heartburn  Has recently started alert new medications  The following portions of the patient's history were reviewed and updated as appropriate: allergies, current medications, past family history, past medical history, past social history, past surgical history and problem list     Review of Systems   Constitutional: Negative  HENT: Negative  Eyes: Positive for visual disturbance  Respiratory: Negative  Cardiovascular: Negative  Gastrointestinal: Negative  Endocrine: Negative  Genitourinary: Negative  Musculoskeletal: Negative  Skin: Negative  Allergic/Immunologic: Negative  Neurological: Positive for dizziness, speech difficulty, weakness, light-headedness, numbness and headaches  Negative for tremors, seizures, syncope and facial asymmetry  Psychiatric/Behavioral: Positive for dysphoric mood and sleep disturbance  The patient is nervous/anxious  Objective:      /84   Pulse 78   Temp 97 9 °F (36 6 °C) (Tympanic)   Ht 5' 3" (1 6 m)   Wt 91 2 kg (201 lb)   LMP 01/11/2020 (Approximate)   SpO2 99%   BMI 35 61 kg/m²          Physical Exam   Constitutional: She is oriented to person, place, and time  She appears well-developed and well-nourished  HENT:   Head: Normocephalic and atraumatic  Right Ear: External ear normal    Left Ear: External ear normal    Eyes: Pupils are equal, round, and reactive to light  Neck: Normal range of motion  Cardiovascular: Normal rate and regular rhythm  Pulmonary/Chest: Effort normal    Abdominal: Soft  Bowel sounds are normal    Musculoskeletal: Normal range of motion  Neurological: She is alert and oriented to person, place, and time  A sensory deficit is present  Coordination abnormal    Skin: Skin is warm and dry  Psychiatric: Her mood appears anxious  She exhibits a depressed mood  Patient is very emotional in the room, tearful     Nursing note and vitals reviewed          Labs:    Lab Results   Component Value Date    WBC 10 94 (H) 01/24/2020    HGB 11 6 01/24/2020    HCT 37 4 01/24/2020    MCV 79 (L) 01/24/2020     01/24/2020     Lab Results   Component Value Date    K 3 6 01/24/2020     01/24/2020    CO2 26 01/24/2020    BUN 15 01/24/2020    CREATININE 0 80 01/24/2020    CALCIUM 8 7 01/24/2020    AST 24 01/24/2020    ALT 55 01/24/2020    ALKPHOS 100 01/24/2020    EGFR 93 01/24/2020     Lab Results   Component Value Date    CALCIUM 8 7 01/24/2020    K 3 6 01/24/2020    CO2 26 01/24/2020     01/24/2020    BUN 15 01/24/2020    CREATININE 0 80 01/24/2020

## 2020-02-10 ENCOUNTER — OFFICE VISIT (OUTPATIENT)
Dept: PHYSICAL THERAPY | Facility: CLINIC | Age: 40
End: 2020-02-10
Payer: COMMERCIAL

## 2020-02-10 DIAGNOSIS — R42 POSTURAL DIZZINESS WITH PRESYNCOPE: Primary | ICD-10-CM

## 2020-02-10 DIAGNOSIS — R55 POSTURAL DIZZINESS WITH PRESYNCOPE: Primary | ICD-10-CM

## 2020-02-10 PROCEDURE — 97110 THERAPEUTIC EXERCISES: CPT | Performed by: PHYSICAL THERAPIST

## 2020-02-10 PROCEDURE — 97140 MANUAL THERAPY 1/> REGIONS: CPT | Performed by: PHYSICAL THERAPIST

## 2020-02-10 NOTE — PROGRESS NOTES
Daily Note     Today's date: 2/10/2020  Patient name: Eve Bourgeois  : 1980  MRN: [id number]  Referring provider: Warren Mora  Dx:   Encounter Diagnosis     ICD-10-CM    1  Postural dizziness with presyncope R42     R55                   Subjective: Patient reports headache pain that she rates 2/10, with 6/10 dizziness  Patient reports increased headache and dizziness that lasted 2 days after previous treatment  Objective: See treatment diary below    TENS w/ MH: 15 minutes to UT, suboccipitals    Manuals:  TPR Upper trap, levator scap, scalenes, rhomboids, SCM, cervical paraspinals  Reviewed use of theracane for home use    Chin Tucks x10 w/ 5 second holds    Postural re-ed:  Gentle correction into scapular depression and retraction  Chin tucks    Assessment: Patient tolerated treatment well today  Patient stated that she had increased dizziness and headache pain after recent treatment  TENS w/ moist heat was applied for pain control  Patient tolerated TPR well and stated she did not feel better or worse post-treatment  Patient reported increased relief with chin tuck stretches  Patient will benefit from skilled PT services to decrease headache pain and dizziness  Plan: Continue per plan of care  Precautions:  has a past medical history of Ear infection, Gestational diabetes, and Migraine

## 2020-02-11 ENCOUNTER — APPOINTMENT (OUTPATIENT)
Dept: LAB | Facility: HOSPITAL | Age: 40
End: 2020-02-11
Payer: COMMERCIAL

## 2020-02-11 ENCOUNTER — OFFICE VISIT (OUTPATIENT)
Dept: FAMILY MEDICINE CLINIC | Facility: CLINIC | Age: 40
End: 2020-02-11
Payer: COMMERCIAL

## 2020-02-11 VITALS
WEIGHT: 201 LBS | TEMPERATURE: 98.2 F | HEART RATE: 110 BPM | DIASTOLIC BLOOD PRESSURE: 82 MMHG | SYSTOLIC BLOOD PRESSURE: 126 MMHG | HEIGHT: 63 IN | BODY MASS INDEX: 35.61 KG/M2 | OXYGEN SATURATION: 99 %

## 2020-02-11 DIAGNOSIS — Z00.00 HEALTHCARE MAINTENANCE: ICD-10-CM

## 2020-02-11 DIAGNOSIS — F41.9 ANXIETY: ICD-10-CM

## 2020-02-11 DIAGNOSIS — M62.838 MUSCLE SPASMS OF BOTH LOWER EXTREMITIES: ICD-10-CM

## 2020-02-11 DIAGNOSIS — G25.2 COARSE TREMORS: ICD-10-CM

## 2020-02-11 DIAGNOSIS — Z13.220 SCREENING FOR LIPID DISORDERS: ICD-10-CM

## 2020-02-11 DIAGNOSIS — Z91.89 AT RISK FOR NUTRITION DEFICIENCY: ICD-10-CM

## 2020-02-11 DIAGNOSIS — R51.9 ACUTE INTRACTABLE HEADACHE, UNSPECIFIED HEADACHE TYPE: Primary | ICD-10-CM

## 2020-02-11 DIAGNOSIS — R47.01 APHASIA: ICD-10-CM

## 2020-02-11 LAB
ALBUMIN SERPL BCP-MCNC: 3.7 G/DL (ref 3.5–5)
ALP SERPL-CCNC: 73 U/L (ref 46–116)
ALT SERPL W P-5'-P-CCNC: 42 U/L (ref 12–78)
ANION GAP SERPL CALCULATED.3IONS-SCNC: 11 MMOL/L (ref 4–13)
AST SERPL W P-5'-P-CCNC: 25 U/L (ref 5–45)
BASOPHILS # BLD AUTO: 0.04 THOUSANDS/ΜL (ref 0–0.1)
BASOPHILS NFR BLD AUTO: 1 % (ref 0–1)
BILIRUB SERPL-MCNC: 0.4 MG/DL (ref 0.2–1)
BUN SERPL-MCNC: 13 MG/DL (ref 5–25)
CALCIUM SERPL-MCNC: 8.8 MG/DL (ref 8.3–10.1)
CHLORIDE SERPL-SCNC: 105 MMOL/L (ref 100–108)
CHOLEST SERPL-MCNC: 189 MG/DL (ref 50–200)
CO2 SERPL-SCNC: 26 MMOL/L (ref 21–32)
CREAT SERPL-MCNC: 0.87 MG/DL (ref 0.6–1.3)
CRP SERPL QL: 5 MG/L
EOSINOPHIL # BLD AUTO: 0.12 THOUSAND/ΜL (ref 0–0.61)
EOSINOPHIL NFR BLD AUTO: 1 % (ref 0–6)
ERYTHROCYTE [DISTWIDTH] IN BLOOD BY AUTOMATED COUNT: 15.8 % (ref 11.6–15.1)
GFR SERPL CREATININE-BSD FRML MDRD: 84 ML/MIN/1.73SQ M
GLUCOSE SERPL-MCNC: 89 MG/DL (ref 65–140)
HCT VFR BLD AUTO: 39.7 % (ref 34.8–46.1)
HDLC SERPL-MCNC: 68 MG/DL
HGB BLD-MCNC: 12.1 G/DL (ref 11.5–15.4)
IMM GRANULOCYTES # BLD AUTO: 0.03 THOUSAND/UL (ref 0–0.2)
IMM GRANULOCYTES NFR BLD AUTO: 0 % (ref 0–2)
LDLC SERPL CALC-MCNC: 83 MG/DL (ref 0–100)
LYMPHOCYTES # BLD AUTO: 2.77 THOUSANDS/ΜL (ref 0.6–4.47)
LYMPHOCYTES NFR BLD AUTO: 32 % (ref 14–44)
MCH RBC QN AUTO: 24.2 PG (ref 26.8–34.3)
MCHC RBC AUTO-ENTMCNC: 30.5 G/DL (ref 31.4–37.4)
MCV RBC AUTO: 80 FL (ref 82–98)
MONOCYTES # BLD AUTO: 0.66 THOUSAND/ΜL (ref 0.17–1.22)
MONOCYTES NFR BLD AUTO: 8 % (ref 4–12)
NEUTROPHILS # BLD AUTO: 4.95 THOUSANDS/ΜL (ref 1.85–7.62)
NEUTS SEG NFR BLD AUTO: 58 % (ref 43–75)
NONHDLC SERPL-MCNC: 121 MG/DL
NRBC BLD AUTO-RTO: 0 /100 WBCS
PLATELET # BLD AUTO: 419 THOUSANDS/UL (ref 149–390)
PMV BLD AUTO: 9.5 FL (ref 8.9–12.7)
POTASSIUM SERPL-SCNC: 3.4 MMOL/L (ref 3.5–5.3)
PROT SERPL-MCNC: 8.1 G/DL (ref 6.4–8.2)
RBC # BLD AUTO: 4.99 MILLION/UL (ref 3.81–5.12)
SODIUM SERPL-SCNC: 142 MMOL/L (ref 136–145)
TRIGL SERPL-MCNC: 189 MG/DL
TSH SERPL DL<=0.05 MIU/L-ACNC: 1.4 UIU/ML (ref 0.36–3.74)
WBC # BLD AUTO: 8.57 THOUSAND/UL (ref 4.31–10.16)

## 2020-02-11 PROCEDURE — 85025 COMPLETE CBC W/AUTO DIFF WBC: CPT

## 2020-02-11 PROCEDURE — 86618 LYME DISEASE ANTIBODY: CPT

## 2020-02-11 PROCEDURE — 99214 OFFICE O/P EST MOD 30 MIN: CPT | Performed by: NURSE PRACTITIONER

## 2020-02-11 PROCEDURE — 80061 LIPID PANEL: CPT

## 2020-02-11 PROCEDURE — 86140 C-REACTIVE PROTEIN: CPT

## 2020-02-11 PROCEDURE — 36415 COLL VENOUS BLD VENIPUNCTURE: CPT

## 2020-02-11 PROCEDURE — 1036F TOBACCO NON-USER: CPT | Performed by: NURSE PRACTITIONER

## 2020-02-11 PROCEDURE — 80053 COMPREHEN METABOLIC PANEL: CPT

## 2020-02-11 PROCEDURE — 82746 ASSAY OF FOLIC ACID SERUM: CPT

## 2020-02-11 PROCEDURE — 84443 ASSAY THYROID STIM HORMONE: CPT

## 2020-02-11 PROCEDURE — 84207 ASSAY OF VITAMIN B-6: CPT

## 2020-02-11 PROCEDURE — 82306 VITAMIN D 25 HYDROXY: CPT

## 2020-02-11 PROCEDURE — 82607 VITAMIN B-12: CPT

## 2020-02-11 PROCEDURE — 1111F DSCHRG MED/CURRENT MED MERGE: CPT | Performed by: NURSE PRACTITIONER

## 2020-02-11 PROCEDURE — 3008F BODY MASS INDEX DOCD: CPT | Performed by: NURSE PRACTITIONER

## 2020-02-11 PROCEDURE — 84590 ASSAY OF VITAMIN A: CPT

## 2020-02-11 RX ORDER — BUTALBITAL, ACETAMINOPHEN AND CAFFEINE 50; 325; 40 MG/1; MG/1; MG/1
1 TABLET ORAL EVERY 4 HOURS PRN
Qty: 30 TABLET | Refills: 0 | Status: SHIPPED | OUTPATIENT
Start: 2020-02-11 | End: 2020-02-19

## 2020-02-11 RX ORDER — LORAZEPAM 0.5 MG/1
0.5 TABLET ORAL EVERY 8 HOURS PRN
Qty: 30 TABLET | Refills: 0 | Status: SHIPPED | OUTPATIENT
Start: 2020-02-11 | End: 2020-03-18 | Stop reason: ALTCHOICE

## 2020-02-11 RX ORDER — BUTALBITAL, ACETAMINOPHEN AND CAFFEINE 50; 325; 40 MG/1; MG/1; MG/1
1 TABLET ORAL EVERY 4 HOURS PRN
Qty: 30 TABLET | Refills: 0 | Status: SHIPPED | OUTPATIENT
Start: 2020-02-11 | End: 2020-02-11 | Stop reason: SDUPTHER

## 2020-02-11 RX ORDER — METAXALONE 800 MG/1
800 TABLET ORAL 3 TIMES DAILY
Qty: 90 TABLET | Refills: 0 | Status: SHIPPED | OUTPATIENT
Start: 2020-02-11 | End: 2020-06-26 | Stop reason: SDUPTHER

## 2020-02-11 NOTE — ASSESSMENT & PLAN NOTE
Reports that the BuSpar is helping but continues to have anxiety because of worsening medical condition

## 2020-02-11 NOTE — ASSESSMENT & PLAN NOTE
Patient has been taking Topamax  Reports having a tension-type headache like a band around her head  Will try Fioricet  Increase rest and hydration

## 2020-02-11 NOTE — ASSESSMENT & PLAN NOTE
Encouraged to maintain safety  Get labs done  Ativan ordered  Muscle relaxer ordered  Patient does have follow-up with Neurology

## 2020-02-11 NOTE — PATIENT INSTRUCTIONS
Stop Valium take Ativan every 8 hours as needed   use Skelaxin as a muscle relaxer to help with the pain takes   Fioricet for the tension headache   get the labs done   get MRI done

## 2020-02-11 NOTE — ASSESSMENT & PLAN NOTE
Worsening gait function  Patient feels like her legs are spasming  Will order Skelaxin  Valium discontinued  Continue physical therapy

## 2020-02-11 NOTE — PROGRESS NOTES
Assessment/Plan:     Intractable headache  Patient has been taking Topamax  Reports having a tension-type headache like a band around her head  Will try Fioricet  Increase rest and hydration  Aphasia  Worsening aphasia  Will get MRI done  Patient does have a follow-up with Neurology  Muscle spasms of both lower extremities  Worsening gait function  Patient feels like her legs are spasming  Will order Skelaxin  Valium discontinued  Continue physical therapy  Coarse tremors  Encouraged to maintain safety  Get labs done  Ativan ordered  Muscle relaxer ordered  Patient does have follow-up with Neurology  Anxiety  Reports that the BuSpar is helping but continues to have anxiety because of worsening medical condition  Problem List Items Addressed This Visit        Other    Intractable headache - Primary     Patient has been taking Topamax  Reports having a tension-type headache like a band around her head  Will try Fioricet  Increase rest and hydration  Relevant Medications    butalbital-acetaminophen-caffeine (FIORICET,ESGIC) -40 mg per tablet    Other Relevant Orders    MRI brain w wo contrast    Anxiety     Reports that the BuSpar is helping but continues to have anxiety because of worsening medical condition  Relevant Medications    LORazepam (ATIVAN) 0 5 mg tablet    Aphasia     Worsening aphasia  Will get MRI done  Patient does have a follow-up with Neurology  Relevant Orders    MRI brain w wo contrast    Coarse tremors     Encouraged to maintain safety  Get labs done  Ativan ordered  Muscle relaxer ordered  Patient does have follow-up with Neurology  Relevant Orders    Lyme Antibody Profile with reflex to WB    Muscle spasms of both lower extremities     Worsening gait function  Patient feels like her legs are spasming  Will order Skelaxin  Valium discontinued  Continue physical therapy           Relevant Medications    metaxalone (SKELAXIN) 800 mg tablet            Subjective:      Patient ID: Kalyn Lim is a 45 y o  female  Patient is here with   Continues to have acute headaches  Has been taking the Topamax  Feels like it is a band around her head  Patient continues to have increasing episodes of aphasia, gait disturbance  Reports that she has memory loss  Has been taking all her medication as ordered  Has been going to vestibular therapy  Continues to have dizziness and weakness  Has a lot of tremors  The following portions of the patient's history were reviewed and updated as appropriate: allergies, current medications, past family history, past medical history, past social history, past surgical history and problem list     Review of Systems   Constitutional: Negative  HENT: Negative  Eyes: Negative  Respiratory: Negative  Cardiovascular: Negative  Gastrointestinal: Negative  Endocrine: Negative  Genitourinary: Negative  Musculoskeletal: Positive for gait problem  Skin: Negative  Allergic/Immunologic: Negative  Neurological: Positive for dizziness, tremors, syncope, speech difficulty, weakness, light-headedness, numbness and headaches  Psychiatric/Behavioral: Positive for dysphoric mood  The patient is nervous/anxious  Objective:      /82   Pulse (!) 110   Temp 98 2 °F (36 8 °C) (Tympanic)   Ht 5' 3" (1 6 m)   Wt 91 2 kg (201 lb)   SpO2 99%   BMI 35 61 kg/m²          Physical Exam   Constitutional: She is oriented to person, place, and time  She appears well-developed and well-nourished  HENT:   Head: Normocephalic and atraumatic  Right Ear: External ear normal    Left Ear: External ear normal    Eyes: Pupils are equal, round, and reactive to light  Neck: Normal range of motion  Cardiovascular: Normal rate and regular rhythm  Pulmonary/Chest: Effort normal    Abdominal: Soft  Bowel sounds are normal    Musculoskeletal: Normal range of motion  Neurological: She is alert and oriented to person, place, and time  She displays normal reflexes  A sensory deficit is present  She exhibits normal muscle tone  Coordination abnormal    Skin: Skin is warm and dry  Psychiatric: Judgment and thought content normal  Her mood appears anxious  Her speech is delayed and slurred  She is slowed  Cognition and memory are impaired  She exhibits a depressed mood  She expresses no suicidal ideation  She expresses no suicidal plans  Very anxious, emotional   Nursing note and vitals reviewed          Labs:    Lab Results   Component Value Date    WBC 10 94 (H) 01/24/2020    HGB 11 6 01/24/2020    HCT 37 4 01/24/2020    MCV 79 (L) 01/24/2020     01/24/2020     Lab Results   Component Value Date    K 3 6 01/24/2020     01/24/2020    CO2 26 01/24/2020    BUN 15 01/24/2020    CREATININE 0 80 01/24/2020    CALCIUM 8 7 01/24/2020    AST 24 01/24/2020    ALT 55 01/24/2020    ALKPHOS 100 01/24/2020    EGFR 93 01/24/2020     Lab Results   Component Value Date    CALCIUM 8 7 01/24/2020    K 3 6 01/24/2020    CO2 26 01/24/2020     01/24/2020    BUN 15 01/24/2020    CREATININE 0 80 01/24/2020

## 2020-02-12 ENCOUNTER — OFFICE VISIT (OUTPATIENT)
Dept: PHYSICAL THERAPY | Facility: CLINIC | Age: 40
End: 2020-02-12
Payer: COMMERCIAL

## 2020-02-12 DIAGNOSIS — R42 POSTURAL DIZZINESS WITH PRESYNCOPE: Primary | ICD-10-CM

## 2020-02-12 DIAGNOSIS — R55 POSTURAL DIZZINESS WITH PRESYNCOPE: Primary | ICD-10-CM

## 2020-02-12 LAB
25(OH)D3 SERPL-MCNC: 19.8 NG/ML (ref 30–100)
FOLATE SERPL-MCNC: 19.9 NG/ML (ref 3.1–17.5)
VIT B12 SERPL-MCNC: 267 PG/ML (ref 100–900)

## 2020-02-12 PROCEDURE — 97112 NEUROMUSCULAR REEDUCATION: CPT | Performed by: PHYSICAL THERAPIST

## 2020-02-13 ENCOUNTER — TRANSCRIBE ORDERS (OUTPATIENT)
Dept: LAB | Facility: HOSPITAL | Age: 40
End: 2020-02-13

## 2020-02-13 ENCOUNTER — TELEPHONE (OUTPATIENT)
Dept: FAMILY MEDICINE CLINIC | Facility: CLINIC | Age: 40
End: 2020-02-13

## 2020-02-13 DIAGNOSIS — Z91.89 AT RISK FOR NUTRITION DEFICIENCY: Primary | ICD-10-CM

## 2020-02-13 LAB — B BURGDOR IGG+IGM SER-ACNC: <0.91 ISR (ref 0–0.9)

## 2020-02-13 NOTE — TELEPHONE ENCOUNTER
Please review telephone call notes from [facility] representative  It looks like you are currently working on this matter, please assist thank you

## 2020-02-13 NOTE — TELEPHONE ENCOUNTER
[place] from [facility] called stating patient does not need a Prior authorization through [employer]  If she's getting the MRI with [facility] she would have to get a prior authorization through [employer] because they are out of network

## 2020-02-13 NOTE — PROGRESS NOTES
Daily Note     Today's date: 2020  Patient name: Rickie Lambert  : 1980  MRN: [id number]  Referring provider: Jeanna Collazo  Dx:   Encounter Diagnosis     ICD-10-CM    1  Postural dizziness with presyncope R42     R55                   Subjective: Patient reports headache pain that she rates 2/10, with 6/10 dizziness  Patient reports increased headache and dizziness that lasted 2 days after previous treatment  Objective: See treatment diary below    TENS w/ MH: 15 minutes to UT, suboccipitals    Manuals:  TPR Upper trap, levator scap, scalenes, rhomboids, SCM, cervical paraspinals  Reviewed use of theracane for home use    Chin Tucks x10 w/ 5 second holds    Postural re-ed:  Gentle correction into scapular depression and retraction  Chin tucks    Oculomotor Screen   Headache 2 / 10  Dizziness 6 / 10     -Smooth Pursuits- Central   Normal, Dizziness 6/10    -Gaze holding nystagmus-   Normal, Dizziness 6/10    -Spontaneous nystagmus room light-  Normal, Dizziness 6/10    -Near Point Convergence- Central   Abnormal, 4 Inches/CM ( 4 inch/ 6CM norm)   Dizziness 7/10  Left eye had lateral drift out     -Saccades- Central   Horizontal   Abnormal, Dizziness 6/10  Vertical   Abnormal, Dizziness 7/10  Challenged with tracking     -VOR Screen / Motion Sensitivity-   Horizontal   Abnormal, Dizziness 8/10  Vertical   Abnormal, Dizziness 7/10    -VOR Cancel- Central   Horizontal   Abnormal, Dizziness 7/10  Vertical   Abnormal, Dizziness 7/10      -Head Thrust-  Horizontal  Abnormal,Left , Dizziness 7/10      Assessment:  Increase in dizziness post session due to testing this date  Gave information to see neuro eye MD for deviations with vision  Slight increase in headache to 3/10 with post manuals reduction to 1/10  Will start oculor exercises next session  Patient will benefit from skilled PT services to decrease headache pain and dizziness  Plan: Continue per plan of care        Precautions: has a past medical history of Ear infection, Gestational diabetes, and Migraine

## 2020-02-13 NOTE — TELEPHONE ENCOUNTER
There was a lot of info and errors that the rep at [facility]  I got it approved, no worries  I also called Antonia to let her know it was approved

## 2020-02-14 ENCOUNTER — HOSPITAL ENCOUNTER (OUTPATIENT)
Dept: RADIOLOGY | Facility: IMAGING CENTER | Age: 40
Discharge: HOME | End: 2020-02-14
Payer: COMMERCIAL

## 2020-02-14 DIAGNOSIS — R51.9 ACUTE INTRACTABLE HEADACHE, UNSPECIFIED HEADACHE TYPE: ICD-10-CM

## 2020-02-14 DIAGNOSIS — R47.01 APHASIA: ICD-10-CM

## 2020-02-14 PROCEDURE — A9585 GADOBUTROL INJECTION: HCPCS | Performed by: NURSE PRACTITIONER

## 2020-02-14 PROCEDURE — 70553 MRI BRAIN STEM W/O & W/DYE: CPT

## 2020-02-14 RX ADMIN — GADOBUTROL 9 ML: 604.72 INJECTION INTRAVENOUS at 11:11

## 2020-02-15 LAB — VIT B6 SERPL-MCNC: 11.2 UG/L (ref 2–32.8)

## 2020-02-17 ENCOUNTER — TELEPHONE (OUTPATIENT)
Dept: NEUROLOGY | Facility: CLINIC | Age: 40
End: 2020-02-17

## 2020-02-17 ENCOUNTER — OFFICE VISIT (OUTPATIENT)
Dept: NEUROLOGY | Facility: CLINIC | Age: 40
End: 2020-02-17
Payer: COMMERCIAL

## 2020-02-17 VITALS
HEIGHT: 63 IN | WEIGHT: 201 LBS | HEART RATE: 68 BPM | DIASTOLIC BLOOD PRESSURE: 80 MMHG | SYSTOLIC BLOOD PRESSURE: 116 MMHG | BODY MASS INDEX: 35.61 KG/M2

## 2020-02-17 DIAGNOSIS — R51.9 ACUTE INTRACTABLE HEADACHE, UNSPECIFIED HEADACHE TYPE: Primary | ICD-10-CM

## 2020-02-17 DIAGNOSIS — G44.89 OTHER HEADACHE SYNDROME: Primary | ICD-10-CM

## 2020-02-17 DIAGNOSIS — R55 POSTURAL DIZZINESS WITH PRESYNCOPE: ICD-10-CM

## 2020-02-17 DIAGNOSIS — R25.1 TREMOR: ICD-10-CM

## 2020-02-17 DIAGNOSIS — R47.81 SLURRED SPEECH: ICD-10-CM

## 2020-02-17 DIAGNOSIS — R42 POSTURAL DIZZINESS WITH PRESYNCOPE: ICD-10-CM

## 2020-02-17 PROCEDURE — 3008F BODY MASS INDEX DOCD: CPT | Performed by: PSYCHIATRY & NEUROLOGY

## 2020-02-17 PROCEDURE — 1111F DSCHRG MED/CURRENT MED MERGE: CPT | Performed by: PSYCHIATRY & NEUROLOGY

## 2020-02-17 PROCEDURE — 1036F TOBACCO NON-USER: CPT | Performed by: PSYCHIATRY & NEUROLOGY

## 2020-02-17 PROCEDURE — 99215 OFFICE O/P EST HI 40 MIN: CPT | Performed by: PSYCHIATRY & NEUROLOGY

## 2020-02-17 RX ORDER — GABAPENTIN 100 MG/1
100 CAPSULE ORAL
Qty: 30 CAPSULE | Refills: 1 | Status: SHIPPED | OUTPATIENT
Start: 2020-02-17 | End: 2020-02-19

## 2020-02-17 NOTE — PROGRESS NOTES
Eve Bourgeois is a 45 y o  female  Chief Complaint   Patient presents with    Headache    Slurred Speech    Gait Problem    Dizziness       Assessment:  1  Acute intractable headache, unspecified headache type    2  Postural dizziness with presyncope    3  Slurred speech    4   Tremor          Discussion:  Differential diagnosis discussed with the patient, status migrainosus, chronic daily headaches versus other etiology, recent MRI scan of the brain with and without contrast and carotid ultrasound was unremarkable, differential diagnosis of her speech difficulty could be secondary to migraine doubt this is a side effect of Topamax but it is possible, also differential diagnosis of tremor discussed with the patient, could be secondary to anxiety doubt this is secondary to medication, would recommend an MRI of the C-spine to evaluate any cervical etiology for gait difficulty or tremor, I think the gait difficulty most likely secondary to her dizziness which is mostly vestibular in etiology or secondary to migraines, also would recommend an MRA scan of the brain given that she is having daily headaches to make sure that there is no underlying aneurysm which I doubt, I have given a prescription for Neurontin 100 mg at nighttime to help with the headaches, side effects discussed with them in detail, she is not keen to go on Depakote, if she is tolerating the Neurontin then we will try to titrate the dose of the Neurontin up and slowly decrease the Topamax down, also given that she is having speech difficulties which are intermittent in nature going to have her see our headache specialist, she was advised to avoid migraine triggers which we discussed in detail including foods to avoid, also was advised to avoid caffeine and limit caffeine to 12-16 oz per day and also was advised to take fall and safety precautions, continue with physical therapy, if the MRA scan of the brain is unremarkable then she can take Imitrex as needed,  to go to the hospital if has any worsening symptoms and call me otherwise to see me back in 1 month and follow up with his other physicians  Subjective:    HPI   Patient is here for evaluation of headaches after her recent discharge from the hospital, she was admitted with headaches for the last 4-6 months, she has been having headaches almost every day,, she had seen a neurologist in [place] the day before she was admitted in the hospital and was started on prednisone and Topamax, she had an initial MRI scan of the brain which was unremarkable she was given Depakote and prednisone and cocktail in the hospital and tells me that since her discharge from the hospital she still continues to have headache almost every day anywhere between 2-6 on 10 it is in the occipital head region and sometimes it is an biparietal areas associated with photophobia and phonophobia, she has also been having some difficulty with her speech she would be fine and suddenly she would feel that she is having slurring and then again she will be able to speak normally, she had an MRI scan of the brain 2 days back with and without contrast that was unremarkable and a carotid ultrasound that was unremarkable, denies any recent illnesses, no fevers, appetite is good, weight has been stable, she also has been complaining of dizziness which is mostly positional in nature and is currently in physical therapy and is using a cane to ambulate, she also has coarse tremors that she feels at times in her body even though she does not see the physical tremor, she has history of migraines for years but they were controlled, no confusion, no loss of conscious, no seizures, no falls, no bowel and bladder incontinence, no other neurological complaints      Vitals:    02/17/20 1147   BP: 116/80   BP Location: Right arm   Patient Position: Sitting   Cuff Size: Large   Pulse: 68   Weight: 91 2 kg (201 lb)   Height: 5' 3" (1 6 m) Current Medications    Current Outpatient Medications:     busPIRone (BUSPAR) 5 mg tablet, Take 1 tablet (5 mg total) by mouth 3 (three) times a day, Disp: 90 tablet, Rfl: 3    butalbital-acetaminophen-caffeine (FIORICET,ESGIC) -40 mg per tablet, Take 1 tablet by mouth every 4 (four) hours as needed for headaches, Disp: 30 tablet, Rfl: 0    LORazepam (ATIVAN) 0 5 mg tablet, Take 1 tablet (0 5 mg total) by mouth every 8 (eight) hours as needed for anxiety, Disp: 30 tablet, Rfl: 0    metaxalone (SKELAXIN) 800 mg tablet, Take 1 tablet (800 mg total) by mouth 3 (three) times a day, Disp: 90 tablet, Rfl: 0    topiramate (TOPAMAX) 50 MG tablet, Take 1 tablet (50 mg total) by mouth 2 (two) times a day, Disp: 60 tablet, Rfl: 0      Allergies  Levaquin [levofloxacin] and Lexapro [escitalopram]    Past Medical History  Past Medical History:   Diagnosis Date    Anxiety     Ear infection     Migraine     OCD (obsessive compulsive disorder)          Past Surgical History:  Past Surgical History:   Procedure Laterality Date     SECTION      CHOLECYSTECTOMY      EAR SURGERY           Family History:  Family History   Problem Relation Age of Onset    Arthritis Family     Lung cancer Family     Diabetes Mother     Lung cancer Mother     Meniere's disease Mother     No Known Problems Father        Social History:   reports that she has never smoked  She has never used smokeless tobacco  She reports that she does not drink alcohol or use drugs  I have reviewed the past medical history, surgical history, social and family history, current medications, allergies vitals, review of systems, and updated this information as appropriate today  Objective:    Physical Exam    Neurological Exam    GENERAL:  Cooperative in no acute distress  Well-developed and well-nourished    HEAD and NECK   Head is atraumatic normocephalic with no lesions or masses   Neck is supple with full range of motion    CARDIOVASCULAR  Carotid Arteries-no carotid bruits  NEUROLOGIC:  Mental Status-the patient is awake alert and oriented with occasional slurring of the speech intermittently,  Cranial Nerves: Visual fields are full to confrontation  Discs are flat  Limited exam as unable to dilate the pupils, visual acuity is 20/20 with hand-held chart  Extraocular movements are full without nystagmus  Pupils are 2-1/2 mm and reactive  Face is symmetrical to light touch  Movements of facial expression move symmetrically  Hearing is normal to finger rub bilaterally  Soft palate lifts symmetrically  Shoulder shrug is symmetrical  Tongue is midline without atrophy  Motor: No drift is noted on arm extension  Strength is full in the upper and lower extremities with normal bulk and tone  Sensory: Intact to temperature and vibratory sensation in the upper and lower extremities bilaterally  Cortical function is intact  Coordination: Finger to nose testing is performed accurately  Romberg is negative  Gait reveals a normal base with symmetrical arm swing  And uses a cane for safety, Tandem walk is abnormal  Reflexes:     2+ and symmetrical  Toes are downgoing  No temporal artery tenderness  No cervical spine tenderness  No meningeal signs  ROS:  Review of Systems   Constitutional: Positive for fatigue  Negative for appetite change, chills and fever  HENT: Positive for tinnitus  Negative for hearing loss, trouble swallowing and voice change  Eyes: Positive for photophobia and pain (burning sensation in eyes)  Respiratory: Positive for shortness of breath  Negative for wheezing  Cardiovascular: Negative  Negative for chest pain and palpitations  Gastrointestinal: Positive for nausea  Negative for vomiting  Endocrine: Negative  Negative for cold intolerance and heat intolerance  Genitourinary: Negative  Negative for dysuria, frequency and urgency     Musculoskeletal: Positive for gait problem and neck pain  Negative for myalgias  Difficulty with fine motor skills      Skin: Negative  Negative for rash  Allergic/Immunologic: Negative  Neurological: Positive for dizziness, tremors, speech difficulty (slurring, stuttering ) and headaches  Negative for seizures, syncope, facial asymmetry, weakness, light-headedness and numbness  Hematological: Negative  Does not bruise/bleed easily  Psychiatric/Behavioral: Negative for confusion, decreased concentration, hallucinations and sleep disturbance  The patient is nervous/anxious

## 2020-02-17 NOTE — TELEPHONE ENCOUNTER
Dr Felipe Amato the [address] with and without ,  Rober Angel is saying it should be without only !!  Please revise!!!

## 2020-02-18 ENCOUNTER — TELEPHONE (OUTPATIENT)
Dept: NEUROLOGY | Facility: CLINIC | Age: 40
End: 2020-02-18

## 2020-02-18 ENCOUNTER — OFFICE VISIT (OUTPATIENT)
Dept: PHYSICAL THERAPY | Facility: CLINIC | Age: 40
End: 2020-02-18
Payer: COMMERCIAL

## 2020-02-18 DIAGNOSIS — R55 POSTURAL DIZZINESS WITH PRESYNCOPE: Primary | ICD-10-CM

## 2020-02-18 DIAGNOSIS — R42 POSTURAL DIZZINESS WITH PRESYNCOPE: Primary | ICD-10-CM

## 2020-02-18 LAB — VIT A SERPL-MCNC: 64.7 UG/DL (ref 18.9–57.3)

## 2020-02-18 PROCEDURE — 97140 MANUAL THERAPY 1/> REGIONS: CPT

## 2020-02-18 NOTE — PROGRESS NOTES
Daily Note     Today's date: 2020  Patient name: Hayley Aldana  : 1980  MRN: [id number]  Referring provider: Gabriel Dewitt  Dx:   Encounter Diagnosis     ICD-10-CM    1  Postural dizziness with presyncope R42     R55        Start Time: 930  Stop Time: 103  Total time in clinic (min): 60 minutes    Subjective: Patient reports headache pain that she rates 7/10, with 7/10 dizziness  Patient arrived very "stressed" today with slurred speech and agitation from her neurologist appointment yesterday  Objective: See treatment diary below    TPR and STM bilaterally     45 minutes to UT, suboccipitals, mid scapular area, rotator cuff    Manuals:  TPR Upper trap, levator scap, scalenes, rhomboids, SCM, cervical paraspinals  Reviewed use of theracane for home use    Chin Tucks x10 w/ 5 second holds    Postural re-ed review  Gentle correction into scapular depression and retraction  Chin tucks      Assessment:  Post manual therapy, patient reported a decrease in symptoms and anxiety with reduction of headache  Patient reported that she notices her symptoms decrease when her anxiety and anger are decreased, patient and  reinforced with decreasing stress levels to assist with symptom management  Patient reported 3/10 headache and 3/10 dizziness, indicating potential cervicogenic origin and potential suboccipital neuralgia with symptom reports  Patient will benefit from skilled PT services to decrease headache pain and dizziness  Plan: Continue per plan of care  Precautions:  has a past medical history of Ear infection, Gestational diabetes, and Migraine

## 2020-02-18 NOTE — TELEPHONE ENCOUNTER
Patient was seen in the [facility] office on 2/17/2020 by Dr Amberly Davis  Patient does not feel comfortable with recommendations made for medication changes  Pt very unhappy with visit  Can we please get patient in ASAP?

## 2020-02-18 NOTE — TELEPHONE ENCOUNTER
Patient has been scheduled with Dr Brenton Edmonds in the [facility] Location for tomorrow 02/19/20 at 09:00am  I will still keep an eye out for a sooner appointment with Dr Adryan Douglas

## 2020-02-18 NOTE — PROGRESS NOTES
[facility] [facility] Consult  PATIENT:  Ree Cruz  MRN:  [id number]  :  1980  DATE OF SERVICE:  2020  REFERRED BY: Kendra Weinberg MD  PMD: LAKSHMI Painter    Assessment/Plan:     Ree Cruz is a very pleasant  45 y o  female with a past medical history that includes OCD anxiety referred here for evaluation of headache  She has been evaluated by multiple neurologists in the recent past  My initial evaluation 2020    Chronic daily headache  Intractable migraine without aura and with status migrainosus  Functional neurological symptom disorder with mixed symptoms  History of Sinus bradycardia  Remi reports a long history of headaches and migraines for years dating back to around   As of initial visit 2020 chronic daily headache and migraine for the past 6 months  She reports pain varies in location as typical associated migrainous features as well as some autonomic features  She denies aura  She also has an assortment of functional neurologic disorder features and we discussed how this happens in the brain in times of stress and pain  - as of 2020: 6 months ago started getting migraines daily, thought it was stress  Only broke in the hospital for a few hours at [facility] before hospitalization 20-24  Workup:  - Neurologic assessment reveals normal neurological exam, with components of functional neurologic disorder  - noncontrast head CT 2020:  No acute intracranial abnormality  - carotid US 20:   RIGHT:  There is no evidence of disease throughout the extracranial carotid arteries  Vertebral artery flow is antegrade  There is no significant subclavian artery disease  LEFT:  There is no evidence of disease throughout the extracranial carotid arteries  Vertebral artery flow is antegrade    There is no significant subclavian artery disease   - MRI brain without contrast 2020: Normal  - MRI brain with without contrast 02/14/2020: No significant interval change since recent examination  No mass effect, acute intracranial hemorrhage or evidence of recent infarction  No abnormal parenchymal or leptomeningeal enhancement identified   - MRA Brain is scheduled for 03/05/2020    Preventative:  - Discussed how cognitive behavioral therapy can help with many symptoms, recommended considering listening to the neuroscience of pain/discomfort lectures on [employer]  * recommended cognitive behavioral therapy  - we discussed headache hygiene and lifestyle factors that may improve headaches  - discussed headache preventative supplements she could consider  - trial of emgality  Discussed proper use, possible side effects and risks  - prescribed by other providers:  continue topiramate 50 mg BID  - prescribed by other providers:  Buspirone, Xanax  - past/failed:  Topiramate, Depakote/valproic acid, escitalopram/Lexapro, she reports significant side effects from any SSRI, tricyclic antidepressants would be contraindicated with the current medication she is on, blood pressure medication would be contraindicated due to hypotension and specifically propranolol metoprolol contraindicated due to diagnosis of bradycardia    Abortive:  - discussed not taking over-the-counter or prescription pain medications more than 3 days per week to prevent medication overuse/rebound headache  - prescribed by other providers:  Metoclopramide, prochlorperazine, ondansetron, metaxalone  - past:  Recommended stopping Fioricet, recommended weaning off benzos  - trial of rizatriptan 10 mg as needed for migraine abortive  Discussed proper use, possible side effects and risks  - trial of Depakote 500 mg p o  Q h s  for 5 nights  Discussed proper use, possible side effects and risks  - if Depakote does not work trial of indomethacin 50 mg t i d  For 5 days with Carafate prior  Discussed proper use, possible side effects and risks    - past/failed: see HPI  - future options: ubrogepant, alternative Triptan    - discussed only continuing physical therapy if she feels like it is helping more than increasing symptoms  - I do not recommend speech therapy, cognitive therapy or vision therapy    Patient instructions      Agree with MRA scheduled     [employer] - Download this free [employer] on your phone (they will offer subscription, but you do not have to do this)  - I recommend listening to the first approximately 5 or so lectures (more if you want) that are about 10-20 minutes long on the neuroscience of pain  - They discuss how pain works in the brain and steps to try and cure chronic pain    - I agree with trying to find a therapist who specializes in cognitive behavioral therapy   Consider   Morelia Carrillo, [facility] [place], [place], [address] , [address] [phone]    Headache/migraine treatment:   Abortive medications (for immediate treatment of a headache): It is ok to take ibuprofen, acetaminophen or naproxen (Advil, Tylenol,  Aleve, Excedrin) if they help your headaches you should limit these to No more than 3 times a week to avoid medication overuse/rebound headaches  For your more moderate to severe migraines take this medication early   Maxalt (rizatriptan) 10mg tabs - take one at the onset of headache  May repeat one time after 1-2 hours if pain has not resolved  (Max 2 a day and 9 a month)     - approximately 50% of people have some side effects from this medication, the other half typically do not  Common side effects include making you feel tired, palpitations, tingling or tightness of the face or chest   Most people report the side effects are nothing compared to their migraines and do not my knees  If you have intolerable side effects we will stop      Trial of Depakote 500 mg for 5 nights     If that does not work trial of indomethacin 50 mg 3 times a day with Carafate 30 minutes prior   - as we discussed this is a strong anti-inflammatory do not take it with other anti-inflammatories  Over the counter preventive supplements for headaches/migraines   (to take every day to help prevent headaches - not to take at the time of headache):  [] Magnesium 400mg daily (If any diarrhea or upset stomach, decrease dose  as tolerated)  [] Riboflavin (Vitamin B2) 200 mg (kids) to 400mg daily (adults) - try online   (FYI B2 may make your urine bright/neon yellow)  AND/OR  [] Herbal medication: Petasites/Butterbur 50mg (kids) - 150 mg (adults) daily - try online  (When choosing your Butterbur online or in the store, beware that there are some poor preps containing pyrrolizidine alkaloids (PAs) that can be harmful to the liver  Therefore, do not use butterbur products that are not certified and labeled as PA-free )    Prescription preventive medications for headaches/migraines   (to take every day to help prevent headaches - not to take at the time of headache):  [x] continue topiramate 50 mg BID for now   [x] trial of emgality - 2 pens the first month and one pen every month after     *Typically these types of medications take time untill you see the benefit, although some may see improvement in days, often it may take weeks, especially if the medication is being titrated up to a beneficial level  Please contact us if there are any concerns or questions regarding the medication  Lifestyle Recommendations:  [x] SLEEP - Maintain a regular sleep schedule: Adults need at least 7-8 hours of uninterrupted a night  Maintain good sleep hygiene:  Going to bed and waking up at consistent times, avoiding excessive daytime naps, avoiding caffeinated beverages in the evening, avoid excessive stimulation in the evening and generally using bed primarily for sleeping  One hour before bedtime would recommend turning lights down lower, decreasing your activity (may read quietly, listen to music at a low volume)   When you get into bed, should eliminate all technology (no texting, emailing, playing with your phone, iPad or tablet in bed)  [x] HYDRATION - Maintain good hydration  Drink  2L of fluid a day (4 typical small water bottles)  [x] DIET - Maintain good nutrition  In particular don't skip meals and try and eat healthy balanced meals regularly  [x] TRIGGERS - Look for other triggers and avoid them: Limit caffeine to 1-2 cups a day or less  Avoid dietary triggers that you have noticed bring on your headaches (this could include aged cheese, peanuts, MSG, aspartame and nitrates)  [x] EXERCISE - physical exercise as we all know is good for you in many ways, and not only is good for your heart, but also is beneficial for your mental health, cognitive health and  chronic pain/headaches  I would encourage at the least 5 days of physical exercise weekly for at least 30 minutes  Education and Follow-up  [x] Please call with any questions or concerns  Of course if any new concerning symptoms go to the emergency department  [x] Follow up with Dr Dolly Mercer in March in [facility] as scheduled already with Dr Nicola Sexton 6/29/20        CC: We had the pleasure of evaluating Eve Bourgeois in neurological consultation today  Eve Bourgeois is a 45 y o    left  handed female who presents today for evaluation of headaches  History obtained from patient as well as available medical record review  History of Present Illness:   Current medical illnesses include migraines, OCD, anxiety    She was admitted 1/24/20-1/26/24 persistent headache for 2 months, presyncope  (prior to this was admitted to [facility] - outside [facility])  - noncontrast head CT, carotid ultrasound, MRI brain were unremarkable  - she was started on Topamax  - given IV steroids, IV magnesium, Depacon, Toradol, and Benadryl  - referred to ENT and physical therapy for dizziness/vestibular    As outpatient she followed with my neurology colleague Dr Ronnie Velez 2/17/20   - it discussed speech, tremor, gait difficulty  - added gabapentin 100 mg p o  Q h s   - who referred her to Dr Shey Burk our headache specialist and she is here today to see if I have anything to offer before the appointment  Headaches started at what age? Worse in 2013 post epidural - migraines once a month, better with exedrin for years  How often do the headaches occur?   - as of 2/19/2020: 6 months ago started getting migraines daily, thought it was stress  Only broke in the hospital for a few hours at [facility] before hospitalization   How long do the headaches last? Before this, 4 hours, better with laying down     Aura? without aura     Last eye exam: 8/2019 - normal except for dry eye     Where is your headache located and pain quality?   - the headache changes, for the first time yesterday it was right side, usually both sides bioccipital like a bowling ball when she first opens her eyes, sits up and worse, migraines to pressure and burning bifrontal and entire head     What is the intensity of pain? Average: 2-3/10, worst 7/10  Associated symptoms:   [x] Nausea       [] Vomiting        [] Diarrhea  [x] Insomnia    [x] Stiff or sore neck   [x] Problems with concentration  [x] Photophobia     [x]Phonophobia      [] Osmophobia  [x] Blurred vision   [x] Prefer quiet, dark room  [x] Light-headed or dizzy     [x] Tinnitus   [] Hands or feet tingle or feel numb/paresthesias      [x] eye irriation     [] Ptosis      [] Facial droop  [] Lacrimation  [x] Nasal congestion/rhinorrhea   [] Flushing of face  [] Change in pupil size      Things that make the headache worse? No specific movements, any movement      Have you seen someone else for headaches or pain? Yes many   Have you had trigger point injection performed and how often? No  Have you had Botox injection performed and how often? No   Have you had epidural injections or transforaminal injections performed? Yes, 2013  Are you current pregnant or planning on getting pregnant? Yes, tubes tied  Have you ever had any Brain imaging?  yes    What medications do you take or have you taken for your headaches?    ABORTIVE:    - laying supine helps a little a 7/10 back down to a 3/10    OTC medications have been ineffective     Has metoclopramide 10 mg   Prochlorperazine 10 mg  Ondansetron   Skelaxin/metaxalone - taking 3 times a day - helps speech    *Valium worked for 12 days  Ativan worked for 2-3 days for 6 days - still taking   fioricet worked once and never again   Prednisone  - multiple cocktails   - ketamine   - given IV steroids, IV magnesium, Depacon, Toradol, and Benadryl    PREVENTIVE:     Topiramate 50 mg BID for 30 days as of 2/19/2020 - no decrease in headaches      Past:  - Buspirone - worked well for anxiety, went off around 1 5 years ago, stress from this restarted 2/2020 and has helped  - when mom passed was lexapro - side effects from SSRI - pacing  - Topiramate  - BP med contraindicated due to hypotension  - Propranolol or metoprolol contraindicated due to bradycardia       LIFESTYLE  Sleep   - averages: 8 hours     Water: 96 per day  Caffeine: a lot per day    Mood:   -anxiety    The following portions of the patient's history were reviewed and updated as appropriate: allergies, current medications, past family history, past medical history, past social history, past surgical history and problem list     Pertinent family history:  Family history of headaches:  migraine headaches in mother, father and sister  Any family history of aneurysms - No    Pertinent social history:  Work: paramedic in an ambulance   Lives with , 4 of 5 kids     Illicit Drugs: denies  Alcohol/tobacco: Denies alcohol use, Denies tobacco use    Past Medical History:     Past Medical History:   Diagnosis Date    Anxiety     Ear infection     Migraine     OCD (obsessive compulsive disorder)        Patient Active Problem List   Diagnosis    Intractable headache    Sinus bradycardia    Leukocytosis    Postural dizziness with presyncope    Encounter to establish care    Anxiety    At risk for nutrition deficiency    Screening for lipid disorders    Heartburn    Aphasia    Coarse tremors    Muscle spasms of both lower extremities    Slurred speech    Tremor       Medications:      Current Outpatient Medications   Medication Sig Dispense Refill    busPIRone (BUSPAR) 5 mg tablet Take 1 tablet (5 mg total) by mouth 3 (three) times a day 90 tablet 3    LORazepam (ATIVAN) 0 5 mg tablet Take 1 tablet (0 5 mg total) by mouth every 8 (eight) hours as needed for anxiety 30 tablet 0    metaxalone (SKELAXIN) 800 mg tablet Take 1 tablet (800 mg total) by mouth 3 (three) times a day 90 tablet 0    topiramate (TOPAMAX) 50 MG tablet Take 1 tablet (50 mg total) by mouth 2 (two) times a day 60 tablet 0    divalproex sodium (DEPAKOTE) 250 mg EC tablet Depakote 500 mg PO QHS for 5 nights 10 tablet 0    gabapentin (NEURONTIN) 100 mg capsule Take 1 capsule (100 mg total) by mouth daily at bedtime (Patient not taking: Reported on 2/19/2020) 30 capsule 1    Galcanezumab-gnlm 120 MG/ML SOAJ Inject 240 mg under the skin once for 1 dose 2 pen 0    Galcanezumab-gnlm 120 MG/ML SOAJ Inject 120 mg under the skin every 30 (thirty) days 1 pen 11    indomethacin (INDOCIN) 50 mg capsule Take 1 capsule (50 mg total) by mouth 3 (three) times a day with meals for 5 days 15 capsule 0    rizatriptan (MAXALT) 10 MG tablet Take 1 tablet (10 mg total) by mouth once as needed for migraine May repeat in 2 hours if needed  Max 2/24 hours, 9/month  9 tablet 3    sucralfate (CARAFATE) 1 g tablet 1 tab PO TID 30 mins prior to the indomethacin to protect your stomach 15 tablet 0     No current facility-administered medications for this visit  Allergies:       Allergies   Allergen Reactions    Levaquin [Levofloxacin] Hallucinations    Lexapro [Escitalopram]        Family History:     Family History   Problem Relation Age of Onset    Arthritis Family     Lung cancer Family     Diabetes Mother     Lung cancer Mother     Meniere's disease Mother     No Known Problems Father        Social History:       Social History     Socioeconomic History    Marital status: /Civil Union     Spouse name: Not on file    Number of children: Not on file    Years of education: Not on file    Highest education level: Not on file   Occupational History    Not on file   Social Needs    Financial resource strain: Not on file    Food insecurity:     Worry: Not on file     Inability: Not on file    Transportation needs:     Medical: Not on file     Non-medical: Not on file   Tobacco Use    Smoking status: Never Smoker    Smokeless tobacco: Never Used   Substance and Sexual Activity    Alcohol use: Never     Frequency: Never    Drug use: Never    Sexual activity: Not on file   Lifestyle    Physical activity:     Days per week: Not on file     Minutes per session: Not on file    Stress: Not on file   Relationships    Social connections:     Talks on phone: Not on file     Gets together: Not on file     Attends Orthodoxy service: Not on file     Active member of club or organization: Not on file     Attends meetings of clubs or organizations: Not on file     Relationship status: Not on file    Intimate partner violence:     Fear of current or ex partner: Not on file     Emotionally abused: Not on file     Physically abused: Not on file     Forced sexual activity: Not on file   Other Topics Concern    Not on file   Social History Narrative    Daily Coffee    Daily Tea         Objective:         Physical Exam:                                                                 Vitals:            Constitutional:    /77 (BP Location: Right arm, Patient Position: Sitting, Cuff Size: Adult)   Pulse 66   Resp 16   Ht 5' 3" (1 6 m)   Wt 91 kg (200 lb 9 6 oz)   LMP 02/07/2020   BMI 35 53 kg/m²   BP Readings from Last 3 Encounters:   02/19/20 133/77   02/17/20 116/80   02/11/20 126/82 Pulse Readings from Last 3 Encounters:   02/19/20 66   02/17/20 68   02/11/20 (!) 110         Well developed, well nourished, well groomed  No dysmorphic features  HEENT:  Normocephalic atraumatic  No meningismus  Oropharynx is clear and moist  No oral mucosal lesions  Chest:  Respirations regular and unlabored  Cardiovascular:  Regular rate, intact distal pulses  Distal extremities warm without palpable edema or tenderness, no observed significant swelling  Musculoskeletal:  Full range of motion  (see below under neurologic exam for evaluation of motor function and gait)   Skin:  warm and dry, not diaphoretic  No apparent birthmarks or stigmata of neurocutaneous disease  Psychiatric:  anxious mood and affect, wearing sunglasses indoors       Neurological Examination:     Mental status/cognitive function:   Orientated to time, place and person  Recent and remote memory intact  Attention span and concentration as well as fund of knowledge are appropriate for age  Normal language and stuttered or slurred speech comes and goes depending on what we are talking about - in functional pattern    Cranial Nerves:  II-visual fields full  Fundi poorly visualized due to pupillary constriction  III, IV, VI-Pupils were equal, round, and reactive to light and accomodation  Extraocular movements were full and conjugate without nystagmus  convergence 2 cm, conjugate gaze, normal smooth pursuits, normal saccades   V-facial sensation symmetric  VII-facial expression symmetric, intact forehead wrinkle, strong eye closure, symmetric smile    VIII-hearing grossly intact bilaterally   IX, X-palate elevation symmetric, no dysarthria  XI-shoulder shrug strength intact    XII-tongue protrusion midline  Motor Exam: symmetric bulk and tone throughout, no pronator drift  Power/strength 5/5 bilateral upper and lower extremities, no atrophy, fasciculations or abnormal movements noted     Sensory: grossly intact light touch in all extremities  Reflexes: brachioradialis 2+, biceps 2+, knee 2+, ankle 2+ bilaterally  No ankle clonus  Coordination: Finger nose finger intact bilaterally, no apparent dysmetria, ataxia or tremor noted * occasional functional tremor  Gait: steady casual and tandem gait  Has cane for stability for functional gait disturbance  Pertinent lab results:   02/11/2020 CMP was significant for potassium 3 4, CBC significant for MCV 80, platelets 274  TSH 1 4, Lyme negative  CRP 5 0     Imaging:   I have personally reviewed imaging and radiology read   - noncontrast head CT 01/24/2020:  No acute intracranial abnormality  - carotid US 1/24/20:   RIGHT:  There is no evidence of disease throughout the extracranial carotid arteries  Vertebral artery flow is antegrade  There is no significant subclavian artery disease  LEFT:  There is no evidence of disease throughout the extracranial carotid arteries  Vertebral artery flow is antegrade  There is no significant subclavian artery disease   - MRI brain without contrast 01/24/2020: Normal  - MRI brain with without contrast 02/14/2020: No significant interval change since recent examination  No mass effect, acute intracranial hemorrhage or evidence of recent infarction  No abnormal parenchymal or leptomeningeal enhancement identified  Review of Systems:   ROS obtained by medical assistant Personally reviewed and updated if indicated  Patient listed multiple symptoms they are not currently experiencing  Review of Systems   Constitutional: Positive for fatigue  Negative for appetite change and fever  HENT: Negative  Negative for hearing loss, tinnitus, trouble swallowing and voice change  Eyes: Positive for photophobia, pain and visual disturbance (Blurry vision)  Respiratory: Negative  Negative for shortness of breath  Cardiovascular: Negative  Negative for palpitations  Gastrointestinal: Positive for nausea  Negative for vomiting  Endocrine: Negative  Negative for cold intolerance and heat intolerance  Genitourinary: Negative  Negative for dysuria, frequency and urgency  Musculoskeletal: Positive for gait problem, neck pain and neck stiffness  Negative for myalgias  Skin: Negative  Negative for rash  Allergic/Immunologic: Negative  Neurological: Positive for dizziness, speech difficulty (studder, slurred, loss of words), light-headedness, numbness and headaches  Negative for tremors, seizures, syncope, facial asymmetry and weakness  Forgetfulness, hard time writing (disconnect)   Hematological: Negative  Does not bruise/bleed easily  Psychiatric/Behavioral: Positive for agitation (frustration) and confusion  Negative for hallucinations and sleep disturbance  Anxiety, depression and mood swings         I have spent 72 minutes with Patient and family today in which greater than 50% of this time was spent in counseling/coordination of care regarding Diagnostic results, Prognosis, Risks and benefits of tx options, Intructions for management, Patient and family education, Importance of tx compliance, Risk factor reductions and Impressions        Author:  Bhargavi Rice MD 2/19/2020 10:26 AM

## 2020-02-19 ENCOUNTER — TELEPHONE (OUTPATIENT)
Dept: NEUROLOGY | Facility: CLINIC | Age: 40
End: 2020-02-19

## 2020-02-19 ENCOUNTER — OFFICE VISIT (OUTPATIENT)
Dept: NEUROLOGY | Facility: CLINIC | Age: 40
End: 2020-02-19
Payer: COMMERCIAL

## 2020-02-19 VITALS
HEART RATE: 66 BPM | BODY MASS INDEX: 35.54 KG/M2 | SYSTOLIC BLOOD PRESSURE: 133 MMHG | RESPIRATION RATE: 16 BRPM | HEIGHT: 63 IN | DIASTOLIC BLOOD PRESSURE: 77 MMHG | WEIGHT: 200.6 LBS

## 2020-02-19 DIAGNOSIS — R51.9 CHRONIC DAILY HEADACHE: Primary | ICD-10-CM

## 2020-02-19 DIAGNOSIS — R00.1 SINUS BRADYCARDIA: ICD-10-CM

## 2020-02-19 DIAGNOSIS — G43.011 INTRACTABLE MIGRAINE WITHOUT AURA AND WITH STATUS MIGRAINOSUS: ICD-10-CM

## 2020-02-19 DIAGNOSIS — F44.7 FUNCTIONAL NEUROLOGICAL SYMPTOM DISORDER WITH MIXED SYMPTOMS: ICD-10-CM

## 2020-02-19 PROCEDURE — 1111F DSCHRG MED/CURRENT MED MERGE: CPT | Performed by: PSYCHIATRY & NEUROLOGY

## 2020-02-19 PROCEDURE — 1036F TOBACCO NON-USER: CPT | Performed by: PSYCHIATRY & NEUROLOGY

## 2020-02-19 PROCEDURE — 3008F BODY MASS INDEX DOCD: CPT | Performed by: PSYCHIATRY & NEUROLOGY

## 2020-02-19 PROCEDURE — 99354 PR PROLONGED SVC OUTPATIENT SETTING 1ST HOUR: CPT | Performed by: PSYCHIATRY & NEUROLOGY

## 2020-02-19 PROCEDURE — 99215 OFFICE O/P EST HI 40 MIN: CPT | Performed by: PSYCHIATRY & NEUROLOGY

## 2020-02-19 RX ORDER — DIVALPROEX SODIUM 250 MG/1
TABLET, DELAYED RELEASE ORAL
Qty: 10 TABLET | Refills: 0 | Status: SHIPPED | OUTPATIENT
Start: 2020-02-19 | End: 2020-02-26

## 2020-02-19 RX ORDER — INDOMETHACIN 50 MG/1
50 CAPSULE ORAL
Qty: 15 CAPSULE | Refills: 0 | Status: SHIPPED | OUTPATIENT
Start: 2020-02-19 | End: 2020-03-16 | Stop reason: ALTCHOICE

## 2020-02-19 RX ORDER — RIZATRIPTAN BENZOATE 10 MG/1
10 TABLET ORAL ONCE AS NEEDED
Qty: 9 TABLET | Refills: 3 | Status: SHIPPED | OUTPATIENT
Start: 2020-02-19 | End: 2020-09-22 | Stop reason: ALTCHOICE

## 2020-02-19 RX ORDER — SUCRALFATE 1 G/1
TABLET ORAL
Qty: 15 TABLET | Refills: 0 | Status: SHIPPED | OUTPATIENT
Start: 2020-02-19 | End: 2020-03-16 | Stop reason: ALTCHOICE

## 2020-02-19 NOTE — TELEPHONE ENCOUNTER
This medication has been removed from the medication list  I do not believe that the AVS will change as gabapentin was on her medication list when it was first generated  Nothing further needed, thanks!

## 2020-02-19 NOTE — PROGRESS NOTES
Review of Systems   Constitutional: Positive for fatigue  Negative for appetite change and fever  HENT: Negative  Negative for hearing loss, tinnitus, trouble swallowing and voice change  Eyes: Positive for photophobia, pain and visual disturbance (Blurry vision)  Respiratory: Negative  Negative for shortness of breath  Cardiovascular: Negative  Negative for palpitations  Gastrointestinal: Positive for nausea  Negative for vomiting  Endocrine: Negative  Negative for cold intolerance and heat intolerance  Genitourinary: Negative  Negative for dysuria, frequency and urgency  Musculoskeletal: Positive for gait problem, neck pain and neck stiffness  Negative for myalgias  Skin: Negative  Negative for rash  Allergic/Immunologic: Negative  Neurological: Positive for dizziness, speech difficulty (studder, slurred, loss of words), light-headedness, numbness and headaches  Negative for tremors, seizures, syncope, facial asymmetry and weakness  Forgetfulness, hard time writing (disconnect)   Hematological: Negative  Does not bruise/bleed easily  Psychiatric/Behavioral: Positive for agitation (frustration) and confusion  Negative for hallucinations and sleep disturbance          Anxiety, depression and mood swings

## 2020-02-19 NOTE — TELEPHONE ENCOUNTER
I did take it off the med list, not sure why it is still showing up?  May take up to 24 hours to change in the system

## 2020-02-19 NOTE — TELEPHONE ENCOUNTER
saw dr Ace Overcast this am and on her after visit summary gabapentin is listed    she states that she never started gabapentin and is asking for it to be taken off her medication list

## 2020-02-19 NOTE — PATIENT INSTRUCTIONS
Agree with [facility] scheduled     [employer] - Download this free Lesli on your phone (they will offer subscription, but you do not have to do this)  - I recommend listening to the first approximately 5 or so lectures (more if you want) that are about 10-20 minutes long on the neuroscience of pain  - They discuss how pain works in the brain and steps to try and cure chronic pain    - I agree with trying to find a therapist who specializes in cognitive behavioral therapy   Consider   Lennette Goltz, [facility] [place] [facility] [place], [address] , [address] [phone]    Headache/migraine treatment:   Abortive medications (for immediate treatment of a headache): It is ok to take ibuprofen, acetaminophen or naproxen (Advil, Tylenol,  Aleve, Excedrin) if they help your headaches you should limit these to No more than 3 times a week to avoid medication overuse/rebound headaches  For your more moderate to severe migraines take this medication early   Maxalt (rizatriptan) 10mg tabs - take one at the onset of headache  May repeat one time after 1-2 hours if pain has not resolved  (Max 2 a day and 9 a month)     - approximately 50% of people have some side effects from this medication, the other half typically do not  Common side effects include making you feel tired, palpitations, tingling or tightness of the face or chest   Most people report the side effects are nothing compared to their migraines and do not my knees  If you have intolerable side effects we will stop  Trial of Depakote 500 mg for 5 nights     If that does not work trial of indomethacin 50 mg 3 times a day with Carafate 30 minutes prior   - as we discussed this is a strong anti-inflammatory do not take it with other anti-inflammatories        Over the counter preventive supplements for headaches/migraines   (to take every day to help prevent headaches - not to take at the time of headache):  [] Magnesium 400mg daily (If any diarrhea or upset stomach, decrease dose  as tolerated)  [] Riboflavin (Vitamin B2) 200 mg (kids) to 400mg daily (adults) - try online   (FYI B2 may make your urine bright/neon yellow)  AND/OR  [] Herbal medication: Petasites/Butterbur 50mg (kids) - 150 mg (adults) daily - try online  (When choosing your Butterbur online or in the store, beware that there are some poor preps containing pyrrolizidine alkaloids (PAs) that can be harmful to the liver  Therefore, do not use butterbur products that are not certified and labeled as PA-free )    Prescription preventive medications for headaches/migraines   (to take every day to help prevent headaches - not to take at the time of headache):  [x] continue topiramate 50 mg BID for now   [x] trial of emgality - 2 pens the first month and one pen every month after     *Typically these types of medications take time untill you see the benefit, although some may see improvement in days, often it may take weeks, especially if the medication is being titrated up to a beneficial level  Please contact us if there are any concerns or questions regarding the medication  Lifestyle Recommendations:  [x] SLEEP - Maintain a regular sleep schedule: Adults need at least 7-8 hours of uninterrupted a night  Maintain good sleep hygiene:  Going to bed and waking up at consistent times, avoiding excessive daytime naps, avoiding caffeinated beverages in the evening, avoid excessive stimulation in the evening and generally using bed primarily for sleeping  One hour before bedtime would recommend turning lights down lower, decreasing your activity (may read quietly, listen to music at a low volume)  When you get into bed, should eliminate all technology (no texting, emailing, playing with your phone, iPad or tablet in bed)  [x] HYDRATION - Maintain good hydration  Drink  2L of fluid a day (4 typical small water bottles)  [x] DIET - Maintain good nutrition   In particular don't skip meals and try and eat healthy balanced meals regularly  [x] TRIGGERS - Look for other triggers and avoid them: Limit caffeine to 1-2 cups a day or less  Avoid dietary triggers that you have noticed bring on your headaches (this could include aged cheese, peanuts, MSG, aspartame and nitrates)  [x] EXERCISE - physical exercise as we all know is good for you in many ways, and not only is good for your heart, but also is beneficial for your mental health, cognitive health and  chronic pain/headaches  I would encourage at the least 5 days of physical exercise weekly for at least 30 minutes  Education and Follow-up  [x] Please call with any questions or concerns  Of course if any new concerning symptoms go to the emergency department    [x] Follow up with Dr Nancy Gallego in March in [facility] as scheduled already with Dr Lucía Flores 6/29/20

## 2020-02-20 ENCOUNTER — OFFICE VISIT (OUTPATIENT)
Dept: PHYSICAL THERAPY | Facility: CLINIC | Age: 40
End: 2020-02-20
Payer: COMMERCIAL

## 2020-02-20 DIAGNOSIS — R42 POSTURAL DIZZINESS WITH PRESYNCOPE: Primary | ICD-10-CM

## 2020-02-20 DIAGNOSIS — R55 POSTURAL DIZZINESS WITH PRESYNCOPE: Primary | ICD-10-CM

## 2020-02-20 PROCEDURE — 97140 MANUAL THERAPY 1/> REGIONS: CPT | Performed by: PHYSICAL THERAPIST

## 2020-02-20 NOTE — TELEPHONE ENCOUNTER
Nallely Granado could you please help with this    I am sure patient can see any neurologist   Thank you

## 2020-02-21 NOTE — TELEPHONE ENCOUNTER
I called [facility] and provided them the below information  She resubmitted this and medication approved through 9/20/2020  Approval # [id number]  Pt made aware

## 2020-02-21 NOTE — TELEPHONE ENCOUNTER
Medication denied because they did not feel that the information provided met the criteria that medication overuse headache has been considered, and you have stoppd drugs that can possibly cuase this type of headache  Please advise if you'd like us to submit with any additional information, they have already received the office note

## 2020-02-21 NOTE — TELEPHONE ENCOUNTER
I'm confused as to how to respond to them  She is not taking concerning medications frequently enough for medication overuse headache, this was considered and is not the cause of her headache  I am not sure what they are referring to when they say I have stopped drugs that cause this headache? Any idea of what they are looking for? She has tried a ton of things and this has hospitalized her multiple times in the past few months

## 2020-02-24 ENCOUNTER — OFFICE VISIT (OUTPATIENT)
Dept: PHYSICAL THERAPY | Facility: CLINIC | Age: 40
End: 2020-02-24
Payer: COMMERCIAL

## 2020-02-24 DIAGNOSIS — R42 POSTURAL DIZZINESS WITH PRESYNCOPE: Primary | ICD-10-CM

## 2020-02-24 DIAGNOSIS — R55 POSTURAL DIZZINESS WITH PRESYNCOPE: Primary | ICD-10-CM

## 2020-02-24 PROCEDURE — 97112 NEUROMUSCULAR REEDUCATION: CPT

## 2020-02-24 PROCEDURE — 97140 MANUAL THERAPY 1/> REGIONS: CPT

## 2020-02-24 NOTE — PROGRESS NOTES
Daily Note     Today's date: 2020  Patient name: Jun Alexandra  : 1980  MRN: [id number]  Referring provider: Mal Or  Dx:   Encounter Diagnosis     ICD-10-CM    1  Postural dizziness with presyncope R42     R55                   Subjective: Pt repots headache is 3/10 and dizziness 3-4/10  She notes she had a really good weekend and woke up both days without a headache or dizziness, however had return of symptoms by 1400  Objective: See treatment diary below    TE:  UT Stretch: 30 sec, 3 reps bilaterally  LV Stretch: 30 sec, 3 reps bilaterally  Theracane to Rhomboids    Manuals in supine:  TPR Upper trap, levator scap, scalenes, SCM, cervical paraspinals  Reviewed use of theracane for home use      Not today:  Postural re-ed review  Gentle correction into scapular depression and retraction  Chin tucks      Assessment: Patient able to tolerate treatment session well today with reduction in HA from 3/10 to 1/10 and dizziness from 4/10 to 2/10  She displayed improved STM following TPR in supine position  Patient will benefit from skilled PT services to decrease headache pain and dizziness  Plan: Continue per plan of care  Precautions:  has a past medical history of Ear infection, Gestational diabetes, and Migraine

## 2020-02-26 ENCOUNTER — OFFICE VISIT (OUTPATIENT)
Dept: FAMILY MEDICINE CLINIC | Facility: CLINIC | Age: 40
End: 2020-02-26
Payer: COMMERCIAL

## 2020-02-26 VITALS
WEIGHT: 201 LBS | HEART RATE: 68 BPM | HEIGHT: 63 IN | TEMPERATURE: 98.6 F | OXYGEN SATURATION: 99 % | DIASTOLIC BLOOD PRESSURE: 78 MMHG | SYSTOLIC BLOOD PRESSURE: 116 MMHG | BODY MASS INDEX: 35.61 KG/M2

## 2020-02-26 DIAGNOSIS — R00.1 BRADYCARDIA: ICD-10-CM

## 2020-02-26 DIAGNOSIS — E55.9 HYPOVITAMINOSIS D: ICD-10-CM

## 2020-02-26 DIAGNOSIS — R55 SYNCOPE, UNSPECIFIED SYNCOPE TYPE: Primary | ICD-10-CM

## 2020-02-26 DIAGNOSIS — F41.9 ANXIETY: ICD-10-CM

## 2020-02-26 DIAGNOSIS — D50.9 IRON DEFICIENCY ANEMIA, UNSPECIFIED IRON DEFICIENCY ANEMIA TYPE: ICD-10-CM

## 2020-02-26 PROCEDURE — 3008F BODY MASS INDEX DOCD: CPT | Performed by: NURSE PRACTITIONER

## 2020-02-26 PROCEDURE — 1036F TOBACCO NON-USER: CPT | Performed by: NURSE PRACTITIONER

## 2020-02-26 PROCEDURE — 99214 OFFICE O/P EST MOD 30 MIN: CPT | Performed by: NURSE PRACTITIONER

## 2020-02-26 RX ORDER — ERGOCALCIFEROL 1.25 MG/1
50000 CAPSULE ORAL 2 TIMES WEEKLY
Qty: 16 CAPSULE | Refills: 0 | Status: SHIPPED | OUTPATIENT
Start: 2020-02-27 | End: 2020-05-26

## 2020-02-26 RX ORDER — BUSPIRONE HYDROCHLORIDE 10 MG/1
10 TABLET ORAL 3 TIMES DAILY PRN
Qty: 30 TABLET | Refills: 2 | Status: SHIPPED | OUTPATIENT
Start: 2020-02-26 | End: 2020-03-18 | Stop reason: SDUPTHER

## 2020-02-26 NOTE — Clinical Note
Please let patient know that her ordered a Holter monitor for her to evaluate the syncope and the bradycardia  Please mail order to  Patient     Thank you

## 2020-02-26 NOTE — ASSESSMENT & PLAN NOTE
Continues to have near syncopal events  Patient reports hydrating a lot  Will get echocardiogram done    Advised to maintain safety

## 2020-02-26 NOTE — PROGRESS NOTES
Assessment/Plan:     Syncope  Continues to have near syncopal events  Patient reports hydrating a lot  Will get echocardiogram done  Advised to maintain safety    Hypovitaminosis D  Vitamin-D level is low  33729 units twice a week for 8 weeks  Iron deficiency anemia  History of iron deficiency anemia  Patient currently has lot of fatigue  Will check iron panel  Slurred speech  Improving  Continue PT  Continue medication as ordered  Muscle spasms of both lower extremities  Continues to have intermittent muscle spasms  Did show some improvement  Maintain safety  Intractable headache  Patient saw migraine specialist   Continue medication as ordered  Continue follow-up  Problem List Items Addressed This Visit        Cardiovascular and Mediastinum    Syncope - Primary     Continues to have near syncopal events  Patient reports hydrating a lot  Will get echocardiogram done  Advised to maintain safety         Relevant Orders    Echo complete with contrast if indicated    Holter monitor - 48 hour       Other    Anxiety    Relevant Medications    busPIRone (BUSPAR) 10 mg tablet    Hypovitaminosis D     Vitamin-D level is low  09294 units twice a week for 8 weeks  Relevant Medications    ergocalciferol (VITAMIN D2) 50,000 units (Start on 2/27/2020)    Iron deficiency anemia     History of iron deficiency anemia  Patient currently has lot of fatigue  Will check iron panel  Relevant Orders    Iron Panel (Includes Ferritin, Iron Sat%, Iron, and TIBC)    Bradycardia    Relevant Orders    Holter monitor - 48 hour            Subjective:      Patient ID: Eve Bourgeois is a 45 y o  female  It patient is here for follow-up on dizziness continue migraine headache, syncope  Get labs done  Patient did see neurologist specialist see and was started on medication  Has to follow up in any of March  Reports that she feels slight improvement    Is able to speak, aphasia has been improving  Continues to have near syncopal episodes  She continues to have migraines but reports definite improvements      The following portions of the patient's history were reviewed and updated as appropriate: allergies, current medications, past family history, past medical history, past social history, past surgical history and problem list     Review of Systems   Constitutional: Positive for fatigue  HENT: Negative  Eyes: Negative  Respiratory: Negative  Cardiovascular: Negative  Gastrointestinal: Negative  Endocrine: Negative  Genitourinary: Negative  Musculoskeletal: Negative  Skin: Negative  Allergic/Immunologic: Negative  Neurological: Positive for dizziness, syncope, speech difficulty, numbness and headaches  Psychiatric/Behavioral: Positive for dysphoric mood  The patient is nervous/anxious  Objective:      /78   Pulse 68   Temp 98 6 °F (37 °C) (Tympanic)   Ht 5' 3" (1 6 m)   Wt 91 2 kg (201 lb)   LMP 02/07/2020   SpO2 99%   BMI 35 61 kg/m²          Physical Exam   Constitutional: She is oriented to person, place, and time  She appears well-developed and well-nourished  HENT:   Head: Normocephalic and atraumatic  Right Ear: External ear normal    Left Ear: External ear normal    Eyes: Pupils are equal, round, and reactive to light  Neck: Normal range of motion  Cardiovascular: Normal rate and regular rhythm  Pulmonary/Chest: Effort normal    Abdominal: Soft  Bowel sounds are normal    Musculoskeletal: Normal range of motion  Neurological: She is alert and oriented to person, place, and time  Skin: Skin is warm and dry  Psychiatric: She has a normal mood and affect  Nursing note and vitals reviewed          Labs:    Lab Results   Component Value Date    WBC 8 57 02/11/2020    HGB 12 1 02/11/2020    HCT 39 7 02/11/2020    MCV 80 (L) 02/11/2020     (H) 02/11/2020     Lab Results   Component Value Date    K 3 4 (L) 02/11/2020  02/11/2020    CO2 26 02/11/2020    BUN 13 02/11/2020    CREATININE 0 87 02/11/2020    CALCIUM 8 8 02/11/2020    AST 25 02/11/2020    ALT 42 02/11/2020    ALKPHOS 73 02/11/2020    EGFR 84 02/11/2020     Lab Results   Component Value Date    CALCIUM 8 8 02/11/2020    K 3 4 (L) 02/11/2020    CO2 26 02/11/2020     02/11/2020    BUN 13 02/11/2020    CREATININE 0 87 02/11/2020

## 2020-02-26 NOTE — Clinical Note
Please call [facility] Psychiatry  Patient had and   has made several attempts to make an appointment and has not received a follow-up phone call  They are aware that they might be a weight but at least once to have some communication    Thank you

## 2020-02-26 NOTE — PATIENT INSTRUCTIONS
Continue meds as ordered  Get echo done to evaluate bradycardia and syncope  Continue physical therapy  Use Buspar 5, 7 5 or 10 as needed 3 times a day  Take multivitamin

## 2020-02-27 ENCOUNTER — OFFICE VISIT (OUTPATIENT)
Dept: PHYSICAL THERAPY | Facility: CLINIC | Age: 40
End: 2020-02-27
Payer: COMMERCIAL

## 2020-02-27 ENCOUNTER — TELEPHONE (OUTPATIENT)
Dept: FAMILY MEDICINE CLINIC | Facility: CLINIC | Age: 40
End: 2020-02-27

## 2020-02-27 DIAGNOSIS — R42 POSTURAL DIZZINESS WITH PRESYNCOPE: Primary | ICD-10-CM

## 2020-02-27 DIAGNOSIS — G43.909 MIGRAINE: ICD-10-CM

## 2020-02-27 DIAGNOSIS — R55 POSTURAL DIZZINESS WITH PRESYNCOPE: Primary | ICD-10-CM

## 2020-02-27 PROCEDURE — 97140 MANUAL THERAPY 1/> REGIONS: CPT | Performed by: PHYSICAL THERAPIST

## 2020-02-27 PROCEDURE — 97112 NEUROMUSCULAR REEDUCATION: CPT | Performed by: PHYSICAL THERAPIST

## 2020-02-27 RX ORDER — TOPIRAMATE 50 MG/1
50 TABLET, FILM COATED ORAL 2 TIMES DAILY
Qty: 60 TABLET | Refills: 0 | Status: SHIPPED | OUTPATIENT
Start: 2020-02-27 | End: 2020-02-28

## 2020-02-27 NOTE — PROGRESS NOTES
Daily Note     Today's date: 2020  Patient name: Blessing Lucero  : 1980  MRN: [id number]  Referring provider: Sammie Mason  Dx:   Encounter Diagnosis     ICD-10-CM    1  Postural dizziness with presyncope R42     R55                   Subjective: Patient reports she is doing okay today, but states that she had a migraine yesterday and still had some effects this morning        Objective: See treatment diary below      Manuals in supine:  TPR Upper trap, levator scap, scalenes, SCM, cervical paraspinals, teres major/minor, infraspinatus, supraspinatus, subscapularis  Reviewed use of theracane for home use      Not today:  Postural re-ed review  Gentle correction into scapular depression and retraction  Chin tucks      Assessment: Patient tolerated treatment well today  Patient displayed increased    Patient able to tolerate treatment session well today with reduction in HA from 3/10 to 1/10 and dizziness from 4/10 to 2/10  She displayed improved STM following TPR in supine position  Patient will benefit from skilled PT services to decrease headache pain and dizziness  Plan: Continue per plan of care  Precautions:  has a past medical history of Ear infection, Gestational diabetes, and Migraine

## 2020-02-28 ENCOUNTER — TELEPHONE (OUTPATIENT)
Dept: FAMILY MEDICINE CLINIC | Facility: CLINIC | Age: 40
End: 2020-02-28

## 2020-02-28 DIAGNOSIS — G43.909 MIGRAINE: ICD-10-CM

## 2020-02-28 RX ORDER — TOPIRAMATE 50 MG/1
TABLET, FILM COATED ORAL
Qty: 60 TABLET | Refills: 0 | Status: SHIPPED | OUTPATIENT
Start: 2020-02-28 | End: 2020-03-16 | Stop reason: SDUPTHER

## 2020-02-28 NOTE — TELEPHONE ENCOUNTER
Called and spoke with Gianni Joseph in the psychiatry department and I gave her the patient's information, explained that she has left messages and has not received a call back  Gianni Joseph mentioned that they are a little back logged with new patients but she will pass along the information

## 2020-02-28 NOTE — TELEPHONE ENCOUNTER
[place] CARE COORDINATION  [address] [facility] [address]    November 29, 2018    Claudia Presley  [address]  [place] [address]      Dear Claudia,    I am a clinic care coordinator who works with David Finney MD at [facility]. I wanted to introduce myself and provide you with my contact information so that you can call me with questions or concerns about your health care. Below is a description of clinic care coordination and how I can further assist you.     The clinic care coordinator is a registered nurse and/or  who understand the health care system. The goal of clinic care coordination is to help you manage your health and improve access to the [facility] system in the most efficient manner. The registered nurse can assist you in meeting your health care goals by providing education, coordinating services, and strengthening the communication among your providers. The  can assist you with financial, behavioral, psychosocial, chemical dependency, counseling, and/or psychiatric resources.    Please feel free to contact me at [phone], with any questions or concerns. We at [facility] are focused on providing you with the highest-quality healthcare experience possible and that all starts with you.     Sincerely,   CLARK Pérez RN Clinic Care Coordinator   [facility], [place], [place]  Phone: [phone]     Enclosed: I have enclosed a copy of the Complex Care Plan. This has helpful information and goals that we have talked about. Please keep this in an easy to access place to use as needed.   Please call patient and let her know that  psychiatry was made aware   Thanks

## 2020-03-02 ENCOUNTER — TELEPHONE (OUTPATIENT)
Dept: PSYCHIATRY | Facility: CLINIC | Age: 40
End: 2020-03-02

## 2020-03-02 NOTE — TELEPHONE ENCOUNTER
Behavorial Health Outpatient Intake Questions    Referred by: PCP     Please advised interviewee that they need to answer all questions truthfully to allow for best care and any misrepresentations of information may affect their ability to be seen at this clinic   => Was this discussed? Yes     [facility]     Presenting Problem (in patient's words): Patient describes having a history of migraines but recently went from 1x a month to having them daily and quite severe  As a result patient describes going out of work and struggling with the change of working full time to not working at all  Patient has an OCD diagnosis which used to be manageable, but now patient feels as though she is unable to control anything in her life  Are there any developmental disabilities? ? If yes, can they speak to you on the phone? If they are too limited to speak to you on phone, refer out No    Are you taking any psychiatric medications? Yes    => If yes, who prescribes? If yes, are they injectable medications? PCP    Does the patient have a language barrier or hearing impairment? No    Have you been treated at [facility] by a therapist or a doctor in the past? If yes, who? No    Has the patient been hospitalized for mental health? No   If yes, how long ago was last hospitalization and where was it? Do you actively use alcohol or marijuana or illegal substances? If yes, what and how much - refer out to Drug and alcohol treatment if use is excessive or daily use of illegal substances No concerns of substance abuse are reported  Do you have a community treatment team or ? No    Legal History-     Does the patient have any history of arrests, penitentiary/longterm time, or DUIs? NO  If Yes-   1) What types of charges? 2) When were they last incarcerated? 3) Are they currently on parole or probation? Minor Child-    Who has custody of the child? Is there a custody agreement?      If there is a custody agreement remind parent that they must bring a copy to the first appt or they will not be seen  Intake Team, please check with provider before scheduling if flags come up such as:  - complex case  - legal history (other than DUI)  - communication barrier concerns are present  - if, in your judgment, this needs further review    ACCEPTED as a patient Yes  => Appointment Date: Monday March 23rd, 2020 @10am w/James, Wednesday April 1st, 2020 @ 1pm w/Dr Yadiel Jeter     Referred Elsewhere? No    Name of Insurance Co: [address] ([address] [facility])  Insurance ID# [id number]  Insurance Phone #  If ins is primary or secondary  If patient is a minor, parents information such as Name, D  O B of guarantor   Michael Bob 3/29/1978

## 2020-03-03 ENCOUNTER — APPOINTMENT (OUTPATIENT)
Dept: PHYSICAL THERAPY | Facility: CLINIC | Age: 40
End: 2020-03-03
Payer: COMMERCIAL

## 2020-03-03 ENCOUNTER — HOSPITAL ENCOUNTER (OUTPATIENT)
Dept: MRI IMAGING | Facility: CLINIC | Age: 40
Discharge: HOME | End: 2020-03-03
Payer: COMMERCIAL

## 2020-03-03 DIAGNOSIS — R51.9 ACUTE INTRACTABLE HEADACHE, UNSPECIFIED HEADACHE TYPE: ICD-10-CM

## 2020-03-03 DIAGNOSIS — R25.1 TREMOR: ICD-10-CM

## 2020-03-03 PROCEDURE — 70544 MR ANGIOGRAPHY HEAD W/O DYE: CPT

## 2020-03-03 PROCEDURE — 72141 MRI NECK SPINE W/O DYE: CPT

## 2020-03-05 ENCOUNTER — HOSPITAL ENCOUNTER (OUTPATIENT)
Dept: NON INVASIVE DIAGNOSTICS | Facility: HOSPITAL | Age: 40
Discharge: HOME | End: 2020-03-05
Payer: COMMERCIAL

## 2020-03-05 ENCOUNTER — OFFICE VISIT (OUTPATIENT)
Dept: PHYSICAL THERAPY | Facility: CLINIC | Age: 40
End: 2020-03-05
Payer: COMMERCIAL

## 2020-03-05 ENCOUNTER — HOSPITAL ENCOUNTER (OUTPATIENT)
Dept: NEUROLOGY | Facility: HOSPITAL | Age: 40
Discharge: HOME | End: 2020-03-05
Attending: PSYCHIATRY & NEUROLOGY
Payer: COMMERCIAL

## 2020-03-05 ENCOUNTER — TELEPHONE (OUTPATIENT)
Dept: NEUROLOGY | Facility: CLINIC | Age: 40
End: 2020-03-05

## 2020-03-05 ENCOUNTER — TELEPHONE (OUTPATIENT)
Dept: FAMILY MEDICINE CLINIC | Facility: CLINIC | Age: 40
End: 2020-03-05

## 2020-03-05 DIAGNOSIS — R55 POSTURAL DIZZINESS WITH PRESYNCOPE: Primary | ICD-10-CM

## 2020-03-05 DIAGNOSIS — E04.1 THYROID NODULE: Primary | ICD-10-CM

## 2020-03-05 DIAGNOSIS — R42 POSTURAL DIZZINESS WITH PRESYNCOPE: Primary | ICD-10-CM

## 2020-03-05 DIAGNOSIS — R47.81 SLURRED SPEECH: ICD-10-CM

## 2020-03-05 DIAGNOSIS — R55 SYNCOPE, UNSPECIFIED SYNCOPE TYPE: ICD-10-CM

## 2020-03-05 DIAGNOSIS — R00.1 BRADYCARDIA: ICD-10-CM

## 2020-03-05 PROCEDURE — 95816 EEG AWAKE AND DROWSY: CPT | Performed by: PSYCHIATRY & NEUROLOGY

## 2020-03-05 PROCEDURE — 93225 XTRNL ECG REC<48 HRS REC: CPT

## 2020-03-05 PROCEDURE — 93226 XTRNL ECG REC<48 HR SCAN A/R: CPT

## 2020-03-05 PROCEDURE — 97140 MANUAL THERAPY 1/> REGIONS: CPT | Performed by: PHYSICAL THERAPIST

## 2020-03-05 PROCEDURE — 95816 EEG AWAKE AND DROWSY: CPT

## 2020-03-05 NOTE — TELEPHONE ENCOUNTER
Spoke with patient's   Reviewed MRI  Will get an ultrasound of the thyroid order  Did review last TSH level which was normal   Patient has received emgality shot  Headache have been slightly improving  Will continue to monitor  Will call with ultrasound results when completed     All questions answered

## 2020-03-05 NOTE — TELEPHONE ENCOUNTER
Discussed with patient MRI of the C-spine results, advised the patient to follow up with family physician regarding thyroid nodule

## 2020-03-06 NOTE — PROGRESS NOTES
Daily Note     Today's date: 3/5/2020  Patient name: Lucretia Goodpasture  : 1980  MRN: [id number]  Referring provider: iWl Santos  Dx:   Encounter Diagnosis     ICD-10-CM    1  Postural dizziness with presyncope R42     R55                   Subjective: Patient reports she is having increased headache pain and reported for therapy wearing sunglasses to decrease light  Objective: See treatment diary below      Manuals in supine:  TPR: Upper trap, levator scap, scalenes, SCM, cervical paraspinals, teres major/minor, infraspinatus, supraspinatus, subscapularis, quadratus lumborum, pec minor     HEP Review: UT stretch, LS stretch, SCM stretch, Pectoral doorway stretch    Not today:  Postural re-ed review  Gentle correction into scapular depression and retraction  Chin tucks      Assessment: Patient tolerated treatment fairly today  Patient displayed increased muscular guarding and tone due to increased pain  She reported that she felt relief from previous treatments for 1-2 days  Patient reported feeling better and more flexible post TPR treatment  Reviewed cervical stretches with patient who performed and understood HEP  Patient will benefit from skilled PT services to decrease her headache pain and dizziness  Plan: Continue per plan of care  Precautions:  has a past medical history of Ear infection, Gestational diabetes, and Migraine

## 2020-03-09 ENCOUNTER — OFFICE VISIT (OUTPATIENT)
Dept: PHYSICAL THERAPY | Facility: CLINIC | Age: 40
End: 2020-03-09
Payer: COMMERCIAL

## 2020-03-09 DIAGNOSIS — R42 POSTURAL DIZZINESS WITH PRESYNCOPE: Primary | ICD-10-CM

## 2020-03-09 DIAGNOSIS — R55 POSTURAL DIZZINESS WITH PRESYNCOPE: Primary | ICD-10-CM

## 2020-03-09 PROCEDURE — 97140 MANUAL THERAPY 1/> REGIONS: CPT | Performed by: PHYSICAL THERAPIST

## 2020-03-09 PROCEDURE — 97530 THERAPEUTIC ACTIVITIES: CPT | Performed by: PHYSICAL THERAPIST

## 2020-03-09 NOTE — PROGRESS NOTES
Daily Note     Today's date: 3/9/2020  Patient name: Sanders Galeazzi  : 1980  MRN: [id number]  Referring provider: Juanna Kehr  Dx:   Encounter Diagnosis     ICD-10-CM    1  Postural dizziness with presyncope R42     R55                   Subjective: Pt reports HA and dizziness upon session today  Notes 6/10 for both         Objective: See treatment diary below      Manuals in supine:  TPR: Upper trap, levator scap, scalenes, SCM, cervical paraspinals, teres major/minor, infraspinatus, supraspinatus, subscapularis, quadratus lumborum, pec minor     MSQ: severe: 67/100    HEP Review: UT stretch, LS stretch, SCM stretch, Pectoral doorway stretch    Not today:  Postural re-ed review  Gentle correction into scapular depression and retraction  Chin tucks      Assessment:   Issued 3 items on MSQ  Reduction in tremors and tension post manuals  Education on how anxiety  and fears is increasing symptoms  Patient will benefit from skilled PT services to decrease her headache pain and dizziness  Plan: Continue per plan of care  Precautions:  has a past medical history of Ear infection, Gestational diabetes, and Migraine

## 2020-03-11 ENCOUNTER — APPOINTMENT (OUTPATIENT)
Dept: PHYSICAL THERAPY | Facility: CLINIC | Age: 40
End: 2020-03-11
Payer: COMMERCIAL

## 2020-03-15 ENCOUNTER — HOSPITAL ENCOUNTER (OUTPATIENT)
Dept: ULTRASOUND IMAGING | Facility: HOSPITAL | Age: 40
Discharge: HOME | End: 2020-03-15
Payer: COMMERCIAL

## 2020-03-15 DIAGNOSIS — E04.1 THYROID NODULE: ICD-10-CM

## 2020-03-15 PROCEDURE — 76536 US EXAM OF HEAD AND NECK: CPT

## 2020-03-16 ENCOUNTER — OFFICE VISIT (OUTPATIENT)
Dept: NEUROLOGY | Facility: CLINIC | Age: 40
End: 2020-03-16
Payer: COMMERCIAL

## 2020-03-16 VITALS
DIASTOLIC BLOOD PRESSURE: 70 MMHG | WEIGHT: 201 LBS | SYSTOLIC BLOOD PRESSURE: 118 MMHG | HEIGHT: 63 IN | BODY MASS INDEX: 35.61 KG/M2 | HEART RATE: 70 BPM

## 2020-03-16 DIAGNOSIS — G43.709 CHRONIC MIGRAINE WITHOUT AURA WITHOUT STATUS MIGRAINOSUS, NOT INTRACTABLE: Primary | ICD-10-CM

## 2020-03-16 DIAGNOSIS — G43.909 MIGRAINE: ICD-10-CM

## 2020-03-16 DIAGNOSIS — R55 VASOVAGAL SYNCOPE: ICD-10-CM

## 2020-03-16 DIAGNOSIS — R42 POSTURAL DIZZINESS WITH PRESYNCOPE: ICD-10-CM

## 2020-03-16 DIAGNOSIS — R51.9 ACUTE INTRACTABLE HEADACHE, UNSPECIFIED HEADACHE TYPE: ICD-10-CM

## 2020-03-16 DIAGNOSIS — R25.1 TREMOR: ICD-10-CM

## 2020-03-16 DIAGNOSIS — D50.8 OTHER IRON DEFICIENCY ANEMIA: ICD-10-CM

## 2020-03-16 DIAGNOSIS — G44.52 NEW DAILY PERSISTENT HEADACHE: ICD-10-CM

## 2020-03-16 DIAGNOSIS — R55 POSTURAL DIZZINESS WITH PRESYNCOPE: ICD-10-CM

## 2020-03-16 DIAGNOSIS — F41.9 ANXIETY: ICD-10-CM

## 2020-03-16 DIAGNOSIS — R47.89 OTHER SPEECH DISTURBANCE: ICD-10-CM

## 2020-03-16 PROBLEM — R47.01 APHASIA: Status: RESOLVED | Noted: 2020-02-11 | Resolved: 2020-03-16

## 2020-03-16 PROBLEM — R00.1 BRADYCARDIA: Status: RESOLVED | Noted: 2020-02-26 | Resolved: 2020-03-16

## 2020-03-16 PROBLEM — G25.2 COARSE TREMORS: Status: RESOLVED | Noted: 2020-02-11 | Resolved: 2020-03-16

## 2020-03-16 PROBLEM — M62.838 MUSCLE SPASMS OF BOTH LOWER EXTREMITIES: Status: RESOLVED | Noted: 2020-02-11 | Resolved: 2020-03-16

## 2020-03-16 PROBLEM — R47.9 SPEECH ABNORMALITY: Status: ACTIVE | Noted: 2020-03-16

## 2020-03-16 PROBLEM — E55.9 HYPOVITAMINOSIS D: Status: RESOLVED | Noted: 2020-02-26 | Resolved: 2020-03-16

## 2020-03-16 PROBLEM — R12 HEARTBURN: Status: RESOLVED | Noted: 2020-02-06 | Resolved: 2020-03-16

## 2020-03-16 PROBLEM — R47.81 SLURRED SPEECH: Status: RESOLVED | Noted: 2020-02-17 | Resolved: 2020-03-16

## 2020-03-16 PROCEDURE — 99215 OFFICE O/P EST HI 40 MIN: CPT | Performed by: PSYCHIATRY & NEUROLOGY

## 2020-03-16 PROCEDURE — 1036F TOBACCO NON-USER: CPT | Performed by: PSYCHIATRY & NEUROLOGY

## 2020-03-16 PROCEDURE — 99354 PR PROLONGED SVC OUTPATIENT SETTING 1ST HOUR: CPT | Performed by: PSYCHIATRY & NEUROLOGY

## 2020-03-16 PROCEDURE — 3008F BODY MASS INDEX DOCD: CPT | Performed by: PSYCHIATRY & NEUROLOGY

## 2020-03-16 RX ORDER — TOPIRAMATE 50 MG/1
TABLET, FILM COATED ORAL
Qty: 60 TABLET | Refills: 0 | Status: SHIPPED | OUTPATIENT
Start: 2020-03-16 | End: 2020-03-24

## 2020-03-16 RX ORDER — VERAPAMIL HYDROCHLORIDE 40 MG/1
TABLET ORAL
Qty: 60 TABLET | Refills: 3 | Status: SHIPPED | OUTPATIENT
Start: 2020-03-16 | End: 2020-05-26

## 2020-03-16 RX ORDER — VENLAFAXINE 25 MG/1
TABLET ORAL
Qty: 60 TABLET | Refills: 3 | Status: SHIPPED | OUTPATIENT
Start: 2020-03-16 | End: 2020-04-01 | Stop reason: ALTCHOICE

## 2020-03-16 NOTE — PROGRESS NOTES
[facility]  PATIENT:  Mj Rodas  MRN:  [id number]  :  1980  DATE OF SERVICE:  3/16/2020      Assessment/Plan:        Problem List Items Addressed This Visit        Cardiovascular and Mediastinum    Postural dizziness with presyncope    Syncope    Chronic migraine without aura without status migrainosus, not intractable - Primary    Relevant Medications    verapamil (CALAN) 40 mg tablet    topiramate (TOPAMAX) 50 MG tablet    venlafaxine (EFFEXOR) 25 mg tablet       Other    Anxiety    Tremor    Iron deficiency anemia    Speech abnormality    New daily persistent headache    Relevant Medications    verapamil (CALAN) 40 mg tablet    topiramate (TOPAMAX) 50 MG tablet    venlafaxine (EFFEXOR) 25 mg tablet    RESOLVED: Intractable headache      Other Visit Diagnoses     Migraine        Relevant Medications    verapamil (CALAN) 40 mg tablet    topiramate (TOPAMAX) 50 MG tablet    venlafaxine (EFFEXOR) 25 mg tablet         Low B12 at 267-patient should be taking B12 sublingual at this time  Vitamin A-  elevated at 64 1  May decrease the intake of vitamin A  As a may cause as patient did develop idiopathic intracranial hypertension  Vitamin-D deficiency:  19 8-continue taking 5000 International Units on daily basis  OCD/anxiety:  Patient will be seeing high psychiatrist and therapist     Cervicalgia:  Basic neck exercises for daily use:    - Neck pathology and poor posture, with straightening of the normal cervical lordosis, can cause headaches  Tightening of the neck muscles can irritate the nerves in the occipital region of the head and cause or worsen head pain  Thus neck strengthening and relaxation exercises, can help improve this particular pain  It is importance to have good posture for improving shoulder, neck, and head pain  - Here are some exercises which should take 5 minutes:     1  Standing, drop your head to one side while continuing to look ahead   Hold for 10 seconds then swap sides  Repeat twice more each side  To increase the stretch, drop the opposite shoulder  2  Standing again, lower your chin to your chest, hold for 10 and then look up to the ceiling and hold for 10  Repeat twice more  3  Next, standing straight again, look over your right shoulder and hold firm for 10 seconds, then over your left shoulder for 10  Repeat this 3 times  4  Finally, while sitting upright, bring your head forward and hold for 10, then all the way back and hold for 10  If this simple exercise does not help improve the posture, we will consider formal physical therapy in the future  New daily persistent headache  Chronic migraine headaches with and without aura:  Preventive therapy for headaches:   Reproductive age women: Should take folic acid daily when taking anti-seizure drugs especially Depakote   -Over-the-counter supplements: to decrease intensity and frequency of migraines  - Magnesium Oxide 400mg a day  If any diarrhea or upset stomach, decrease dose  as tolerated  (oral magnesium oxide may be an effective preventive strategy for people with migraine  Some theories about how it works include the idea that magnesium can help to prevent waves of cortical spreading depression and aura  Magnesium, in theory, also reduces the release of inflammatory or activating chemicals that can cause migraine)  - Vitamin B2 200 mg twice a day  May cause the urine to turn yellow which is normal for B 2 to do and is not a sign that you are dehydrated  (may be an effective preventive medication in some people with migraine)  - Vitamin E which may help with menstrual migraine  There is limited data on this, but it may reduce nausea, photophobia and phonophobia during menstruation    - Will increase patient's topiramate to 50 mg in the morning and 100 mg at bedtime  (reviewed side effects with the patient  Numbness and tingling patient is to increase potassium intake  Prevent kidney stone by drinking about 64 oz of non caffeinated fluids  Helps with weight loss and is a FDA approved weight loss drug  Informed patient that it may cause word-finding difficulty, if that is a problem patient is to call so that we may stop the drug)  - Continue emgality for two more months to see how pt does  If no improvement thus may stop    -will have patient start taking verapamil 40 mg half a tab twice a day for 1 week then 1 tab twice a day after that  - start venlafaxine 25 mg half a tab in a m  For 7 days then 1 tab in a m  For 7 days then 1 tab twice a day after that  Upon follow-up consider changing to long-acting 37 5 mg 2 tabs  Abortive therapy for headaches:   - At the onset of her migraine headache patient is taking Maxalt 10 mg with Skelaxin which does seem to help but she feels she gets a rebound the next day  Will see with her above treatment if that can be prevented  - Consider adding olanzapine if Maxalt continues to cause a rebound headache the next day  Headache management instructions  - When patient has a moderate to severe headache, they should seek rest, initiate relaxation and apply cold compresses to the head  - Maintain regular sleep schedule  Adults need at least 7-8 hours of uninterrupted a night  - Limit over the counter medications such as Tylenol, Ibuprofen, Aleve, Excedrin  (No more than 2- 3 times a week or max 10 a month)  - Maintain headache diary  Free CJ for a smart phone, which can be used is "Migraine fredy"  - Limit caffeine to 1-2 cups 8 to 16 oz a day or less  - Avoid dietary trigger  (aged cheese, peanuts, MSG, aspartame and nitrates)  - Patient is to have regular frequent meals to prevent headache onset  - Please drink at least 64 ounces of water a day to help remain hydrated      Cognitive behavioral therapy (CBT) is a common type of talk therapy (psychotherapy) were you work with a psychotherapist or therapist   CBT helps you become aware of inaccurate or negative thinking so you can view challenging situations more clearly and respond to them in a more effective way  CBT can be a very helpful tool ? either alone or in combination with other therapies ? in treating mental health disorders (depression, post-traumatic stress disorder (PTSD) or an eating disorder) and chronic pain  CBT can be an effective tool to help anyone learn how to better manage stressful life situations and pain  In some cases, CBT is most effective when it's combined with other treatments, such as antidepressants or other medications  You can start yourself by down loading the case: [employer]      Mindfulness/Meditation for Treating Migraine  Many people believe that stress is a major trigger for their migraine  This is where mindfulness and meditation can come into play, as they have been known to help reduce migraine severity, duration and acute pain medication use  It may also help to relieve stress and anxiety while improving feelings of well being  Biofeedback involves becoming more aware of the changes that occur in the body and learning how to exert control over generally involuntary functions  Biofeedback allows you to see your vitals in real-time and learn how to stabilize them on your own  There is great evidence that biofeedback can reduce the frequency, intensity, and duration of migraine and tension-type headache  When you're stressed, you may notice elevated heart rates, tightened muscles, and sweating  During biofeedback, you can see these changes on a monitor, then a therapist teaches you exercises to help manage these changes  Rudell Severe for Treating Migraine  The kind of mind/body therapy that yoga can provide may help create relief from migraine  Keeping up with yoga consistently can reduce headache frequency, intensity and duration, so it's important to practice regularly if you plan to use it as a complementary migraine treatment   However, certain types of yoga such as hot yoga may be uncomfortable for people with migraine  Others, such as restorative yoga, may be tolerable even for a patient with chronic migraine  Hernandez [place] can also have a similar benefit for patients with migraine  Specifically, it can help improve balance, which can be very useful for those with vestibular symptoms or vestibular migraine  Acupuncture for Treating Migraine  A traditional [facility] medicine, acupuncture is reported to increase the release of serotonin, dopamine and other chemicals that may help to treat chronic pain, and can be helpful in preventing episodic migraine  There are, however, conflicting results on studies in acupuncture as a treatment for migraine  Importance of Healthy Sleep:  Behavioral sleep changes can promote restful, regular sleep and reduce headache  Simple changes like establishing consistent sleep and wake-up times, as well as getting between 7 and 8 hours of sleep a day, can make a world of difference  Experts also recommend avoiding substances that impair sleep, like caffeine, nicotine and alcohol, and also suggest winding down before bed to prevent sleep problems  To read more go to https://americanmigrainefoundation  org/resource-library/sleep/    Exercising for migraineurs:  Regular exercise can reduce the frequency and intensity of headaches and migraines  When one exercises, the body releases endorphins, which are the bodys natural painkillers  Exercise reduces stress and helps individuals to sleep at night  Exercising at least 30 to 40 minutes 3 times a week is sufficient for most patients  When exercising, follow this plan to prevent headaches:  - First, stay hydrated before, during, and after exercise  - Second part of the exercise plan is to eat sufficient food about an hour and a half before you exercise   Exercise causes ones blood sugar level to decrease, and it is important to have a source of energy    - Final part of the exercise plan is to warm-up  Do not jump into sudden, vigorous exercise if that triggers a headache or migraine  To read more go to https://americanmigrainefoundation  org/resource-library/effects-of-exercise-on-headaches-and-migraines/     Please call with any questions or concerns  Office number is [address] Monitoring Program report was reviewed and was appropriate       History of Present Illness: We had the pleasure of evaluating Eve Bourgeois in neurological consultation today for headaches  As you know,  she is a 45 y o  left handed  female  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  Patient is here today with her  and states she has 5 children her illnesses 21 in her young aces 11year-old  She is here today for evaluation of her headaches  Medical history review:  Qtc:  1/24/2020 - 392  Tobacco use: none  Gastric sleeve - 2016 -     Tremors:   - hand tremors started after her weight lost surgery in oct of 2016    - Has been going on for years but has gotten worse over the last few months due to headaches  - seen with action and not at rest  But pt does feel it herself at rest    - located in both her upper extremity  Speech abnormality:   - started in jan of 2020  - she has noted she was having word finding difficulty and was slurring her words  It is intermittent in nature  Worse with headaches and stress  She is repeating her sentences    -if this continues I will wean her off of Topamax to see if this helps  Syncope/dizziness: :   She did have dizziness in November of 2019 and then in Jan it became much worse  On January 21st 2020, had her first event of blacking out  She was at work lying on the couch when she got up to put her boots on she noticed everything went black and she went down on the couch for 2-3 seconds    She recovered quickly and put her boots on and went on to complete the dispatch that had just come in  States that she did feel shaky and clammy with nausea after she had the event of blacking out  Then she put her boots on she shaky, clammy and nauseated  She did not tell anyone and went on a dispatch  She after that told her team she did not feel good as she looked pale  She at that time was noted to have trouble walking  She went to the ER and was noted to have elevated BP for at least 2-3 days  She did have a migraine with this and thus was admitted for treatment for 3 days and treated with [facility] migraine IV protocol  Mood:   OCD - was controlled in the past but recently not doing well due to her medical problems  Depression: no  Anxiety: yes   Seeing a psychiatrist/ How often? Yes in the past  Will be seeing them again   Seeing at therapist/ how often? Yes, will be seeing them again    Headaches:   Any family history of migraines? Father and sister  Any family history of aneurysms? none    Have you seen someone else for headaches or pain? Dr Aliyah Morejon    Headaches started at what age? 89 - 29years of age after she had her epidural for her first child and thus had a low pressure headaches and that triggered her headache  What is your current pain level? 2-3/10    How often do the headaches occur? April of 2019 they were occurring on daily basis  Mild headaches: 4 a week  Moderate to severe headaches: 3 a week    Are you ever headache free? Yes with skelaxin and Maxalt it can go down to 0/10 for 8 hours    Aura/Warning and how long does it last?  - blurry vision for an hour and then headache gets worse - this occurs 2-3 times a week    What time of the day do the headaches start?    Mild headaches: in am when she wakes up  Moderate to severe headaches: 2-4 pm    How long do the headaches last?   Mild headaches: there all day and worse as the day progress  Moderate to severe headaches:  With ice pack it can get down to low number in 30 minutes but has to rest in a dark room    Where is your headache located? Mild headaches: diffuse, facial, neck  Moderate to severe headaches: left frontal, temporal but sometime on the right side    Describe your usual headache? Mild headaches: pressure, dull, achy  Moderate to severe headaches: throbbing and pounding, at time stabbing on the left temple    What is the intensity of pain? Mild headaches: baseline pain - 2-3/10 and with exertion can get up to 5/10  Moderate to severe headaches: 8/10    Associated symptoms:   - Decreased appetite   Nausea       - Photophobia     Phonophobia      Osmophobia  - pale  - Stiff or sore neck   - Dizziness   light headed  - Problems with concentration  - Blurred vision     - word finding difficulty, balance problem  - Insomnia  - Prefer to be in a cool, quiet, dark room    Number of days missed per month because of headaches:  Work (or school) days: has not worked for the last few months  Social or Family activities:  often    Headache are worse if the patient: cough, sneeze, bending over, exertion  Headache triggers:  overstimulation  What time of the year do headaches occur more frequently? none    Have you had trigger point injection performed and how often? No  Have you had Botox injection performed and how often? No   Have you had epidural injections or transforaminal injections performed? No    Alternative therapies used in the past for headaches? physical therapy  Have you used CBD or THC for your headaches and how often? No  How many caffeine products to drink a day? 30  How much water to drink a day? 16 oz - 4 a day    Are you current pregnant or planning on getting pregnant? What medications do you take or have you taken for your headaches?    Preventive therapy:   - vitamin-D, magnesium sulfate 2 g,  - Emgality - she has only done one thus far  - topiramate 50 mg, Depakote 500/1 g mg, gabapentin 100 mg,  - BuSpar 10 mg,  - Lexapro (became manic on this)  - lorazepam 0 5 mg, Valium 5 mg,  - Skelaxin 800 mg, Flexeril 5 mg t i d ,  - Benadryl 50 mg,  Abortive Therapy:   - indomethacin 50 mg, Toradol 30 mg,  - Maxalt 10 mg, sumatriptan 50 mg,  - Fioricet,  - Compazine 10 mg, Zofran 4 mg, Reglan 10 mg,  - prednisone 20 mg, methylprednisolone,           Have you ever had any Brain imaging? Yes  - Reviewed old notes from physician seen in the past  - Reviewed images on Portal   I personally reviewed these images  Recent laboratory data was reviewed  Medications and allergies were reviewed  03/03/2020-MRI cervical spine:  FINDINGS:   ALIGNMENT:  Normal alignment of the cervical spine  No compression fracture  No subluxation  No scoliosis    MARROW SIGNAL:  Normal marrow signal is identified within the visualized bony structures  No discrete marrow lesion    CERVICAL AND VISUALIZED THORACIC CORD:  Normal signal within the visualized cord    PREVERTEBRAL AND PARASPINAL SOFT TISSUES: Heterogeneous 1 7 cm nodule associated with the thyroid gland, image 5, series 3 noted, indeterminant  Incidental discovery of one or more thyroid nodule(s) measuring more than 1 5 cm and without suspicious   features is noted in this patient who is above 28years old; according to guidelines published in the February 2015 white paper on incidental thyroid nodules in the Journal of the [employer] of Radiology [place]), further characterization with thyroid ultrasound is recommended    VISUALIZED POSTERIOR FOSSA:  The visualized posterior fossa demonstrates no abnormal signal    CERVICAL DISC SPACES:   C2-C3:  Normal    C3-C4:  Normal    C4-C5:  Normal    C5-C6:  There is a small central disc herniation, protrusion type  Mild central canal narrowing  Neural foramina patent bilaterally    C6-C7:  Normal    C7-T1:  Normal    UPPER THORACIC DISC SPACES:  Normal    IMPRESSION:   1   Mild spondylosis without cord compression or cord signal abnormality    2   Indeterminant left-sided thyroid nodule, requiring Further characterization with thyroid ultrasound    Incidental thyroid nodule(s) for which nonemergent thyroid ultrasound is recommended  02/14/2020-MRI of the brain with without contrast:  FINDINGS:   BRAIN PARENCHYMA:  There is no discrete mass, mass effect or midline shift  There is no intracranial hemorrhage  Normal posterior fossa  Diffusion imaging is unremarkable     There are no white matter changes in the cerebral hemispheres    Postcontrast imaging of the brain demonstrates no abnormal enhancement    VENTRICLES:  Normal for the patient's age    SELLA AND PITUITARY GLAND:  Normal    ORBITS:  Normal    PARANASAL SINUSES:  Normal    VASCULATURE:  Evaluation of the major intracranial vasculature demonstrates appropriate flow voids    CALVARIUM AND SKULL BASE:  Normal    EXTRACRANIAL SOFT TISSUES:  Normal    IMPRESSION:   No significant interval change since recent examination  No mass effect, acute intracranial hemorrhage or evidence of recent infarction  No abnormal parenchymal or leptomeningeal enhancement identified  03/03/2020-MRA of the brain:  FINDINGS:   IMAGE QUALITY:  Diagnostic    ANATOMY   INTERNAL CAROTID ARTERIES:  Normal flow related enhancement of the distal cervical, petrous and cavernous segments of the internal carotid arteries  Normal ICA terminus    ANTERIOR CIRCULATION:  Normal A1 segments  Normal anterior communicating artery  Normal flow-related enhancement of the anterior cerebral arteries    MIDDLE CEREBRAL ARTERY CIRCULATION:  The M1 segment and middle cerebral artery branches demonstrate normal flow-related enhancement    DISTAL VERTEBRAL ARTERIES:  Distal vertebral arteries are patient with a normal vertebrobasilar junction  The posterior inferior cerebellar artery origins are normal     BASILAR ARTERY:  Normal    POSTERIOR CEREBRAL ARTERIES:  Both posterior cerebral arteries arises from the basilar tip  Both arteries demonstrate normal flow-related enhancement    Normal posterior communicating arteries    IMPRESSION:   No intracranial aneurysm or major intracranial arterial stenosis  02/17/2020-EEG routine:  IMPRESSION:    This is a normal routine EEG  If a seizure disorder is considered clinically a repeat tracing with sleep deprivation may be of additional diagnostic value                                                            Past Medical History:   Diagnosis Date    Anxiety     Ear infection     Migraine     OCD (obsessive compulsive disorder)        Patient Active Problem List   Diagnosis    Sinus bradycardia    Leukocytosis    Postural dizziness with presyncope    Encounter to establish care    Anxiety    At risk for nutrition deficiency    Screening for lipid disorders    Tremor    Syncope    Iron deficiency anemia    Speech abnormality    New daily persistent headache    Chronic migraine without aura without status migrainosus, not intractable       Medications:      Current Outpatient Medications   Medication Sig Dispense Refill    busPIRone (BUSPAR) 10 mg tablet Take 1 tablet (10 mg total) by mouth 3 (three) times a day as needed (anxiety) 30 tablet 2    ergocalciferol (VITAMIN D2) 50,000 units Take 1 capsule (50,000 Units total) by mouth 2 (two) times a week for 16 doses 16 capsule 0    Galcanezumab-gnlm 120 MG/ML SOAJ Inject 120 mg under the skin every 30 (thirty) days 1 pen 11    LORazepam (ATIVAN) 0 5 mg tablet Take 1 tablet (0 5 mg total) by mouth every 8 (eight) hours as needed for anxiety 30 tablet 0    metaxalone (SKELAXIN) 800 mg tablet Take 1 tablet (800 mg total) by mouth 3 (three) times a day 90 tablet 0    rizatriptan (MAXALT) 10 MG tablet Take 1 tablet (10 mg total) by mouth once as needed for migraine May repeat in 2 hours if needed  Max 2/24 hours, 9/month  9 tablet 3    topiramate (TOPAMAX) 50 MG tablet 1 in morning and 2 at bedtime 60 tablet 0    venlafaxine (EFFEXOR) 25 mg tablet Half a tab in a m   For 7 days then 1 tab in a m  For 7 days then 1 tab twice a day after that 60 tablet 3    verapamil (CALAN) 40 mg tablet Half a tab twice a day for 7 days then 1 tablet twice a day after that 60 tablet 3     No current facility-administered medications for this visit  Allergies:       Allergies   Allergen Reactions    Levaquin [Levofloxacin] Hallucinations    Lexapro [Escitalopram]        Family History:     Family History   Problem Relation Age of Onset    Arthritis Family     Lung cancer Family     Diabetes Mother     Lung cancer Mother     Meniere's disease Mother     No Known Problems Father        Social History:     Social History     Socioeconomic History    Marital status: /Civil Union     Spouse name: Not on file    Number of children: Not on file    Years of education: Not on file    Highest education level: Not on file   Occupational History    Not on file   Social Needs    Financial resource strain: Not on file    Food insecurity:     Worry: Not on file     Inability: Not on file    Transportation needs:     Medical: Not on file     Non-medical: Not on file   Tobacco Use    Smoking status: Never Smoker    Smokeless tobacco: Never Used   Substance and Sexual Activity    Alcohol use: Never     Frequency: Never    Drug use: Never    Sexual activity: Not on file   Lifestyle    Physical activity:     Days per week: Not on file     Minutes per session: Not on file    Stress: Not on file   Relationships    Social connections:     Talks on phone: Not on file     Gets together: Not on file     Attends Cheondoism service: Not on file     Active member of club or organization: Not on file     Attends meetings of clubs or organizations: Not on file     Relationship status: Not on file    Intimate partner violence:     Fear of current or ex partner: Not on file     Emotionally abused: Not on file     Physically abused: Not on file     Forced sexual activity: Not on file   Other Topics Concern    Not on file   Social History Narrative    Daily Coffee    Daily Tea         Objective:   Physical Exam:                                                                   Vitals:               /70 (BP Location: Right arm, Patient Position: Sitting, Cuff Size: Large)   Pulse 70   Ht 5' 3" (1 6 m)   Wt 91 2 kg (201 lb)   BMI 35 61 kg/m²   BP Readings from Last 3 Encounters:   03/16/20 118/70   02/26/20 116/78   02/19/20 133/77     Pulse Readings from Last 3 Encounters:   03/16/20 70   02/26/20 68   02/19/20 66              CONSTITUTIONAL: Well developed, well nourished, well groomed  No dysmorphic features  Eyes:  PERRLA, EOM normal      Neck:  Normal ROM, neck supple  HEENT:  Normocephalic atraumatic  No meningismus  Oropharynx is clear and moist  No oral mucosal lesions  Chest:  Respirations regular and unlabored  Cardiovascular:  Distal extremities warm without palpable edema or tenderness, no observed significant swelling  Musculoskeletal:  Full range of motion  Skin:  warm and dry   Psychiatric:  Normal behavior and appropriate affect        Neurological Examination:     Mental status/cognitive function: Orientated to time, place and person  Cranial Nerves: 2 to 12 intact    Motor Exam:    5/5 right upper extremity  5/5 left upper extremity  5/5 right lower extremity  5/5 left lower extremity    Sensory:   - grossly intact light touch in all extremities  Reflexes:   3/4 right upper extremity  3/4 left upper extremity  3/4 right lower extremity  3/4 left lower extremity  No clonus noted    Coordination:   - Finger to nose intact bilaterally  - no tremor noted at rest, coarse tremor noted with arms stretched out and tremor more pronounced when she does finger-to-nose  Gait: steady casual  gait, able to do tandem gait, romberg negative  Review of Systems:   Review of Systems   Constitutional: Negative  HENT: Negative  Eyes: Negative  Respiratory: Negative  Cardiovascular: Negative  Gastrointestinal: Negative  Endocrine: Negative  Genitourinary: Negative  Musculoskeletal: Negative  Skin: Negative  Allergic/Immunologic: Negative  Neurological: Positive for light-headedness and headaches  Hematological: Negative  Psychiatric/Behavioral: The patient is nervous/anxious  All other systems reviewed and are negative  I have spent 60 minutes with Patient  today in which greater than 50% of this time was spent in counseling/coordination of care regarding Diagnostic results, Prognosis, Risks and benefits of tx options, Intructions for management, Patient and family education, Importance of tx compliance, Risk factor reductions, Impressions and Plan of care as above        Author:  Kyle Dang MD 3/16/2020 11:49 AM

## 2020-03-16 NOTE — PATIENT INSTRUCTIONS
Low B12 at 267-patient should be taking B12 sublingual at this time  Vitamin A-  elevated at 64 1  May decrease the intake of vitamin A  As a may cause as patient did develop idiopathic intracranial hypertension  Vitamin-D deficiency:  19 8-continue taking 5000 International Units on daily basis  OCD/anxiety:  Patient will be seeing high psychiatrist and therapist     Cervicalgia:  Basic neck exercises for daily use:    - Neck pathology and poor posture, with straightening of the normal cervical lordosis, can cause headaches  Tightening of the neck muscles can irritate the nerves in the occipital region of the head and cause or worsen head pain  Thus neck strengthening and relaxation exercises, can help improve this particular pain  It is importance to have good posture for improving shoulder, neck, and head pain  - Here are some exercises which should take 5 minutes:     1  Standing, drop your head to one side while continuing to look ahead  Hold for 10 seconds then swap sides  Repeat twice more each side  To increase the stretch, drop the opposite shoulder  2  Standing again, lower your chin to your chest, hold for 10 and then look up to the ceiling and hold for 10  Repeat twice more  3  Next, standing straight again, look over your right shoulder and hold firm for 10 seconds, then over your left shoulder for 10  Repeat this 3 times  4  Finally, while sitting upright, bring your head forward and hold for 10, then all the way back and hold for 10  If this simple exercise does not help improve the posture, we will consider formal physical therapy in the future         New daily persistent headache  Chronic migraine headaches with and without aura:  Preventive therapy for headaches:   Reproductive age women: Should take folic acid daily when taking anti-seizure drugs especially Depakote   -Over-the-counter supplements: to decrease intensity and frequency of migraines  - Magnesium Oxide 400mg a day  If any diarrhea or upset stomach, decrease dose  as tolerated  (oral magnesium oxide may be an effective preventive strategy for people with migraine  Some theories about how it works include the idea that magnesium can help to prevent waves of cortical spreading depression and aura  Magnesium, in theory, also reduces the release of inflammatory or activating chemicals that can cause migraine)  - Vitamin B2 200 mg twice a day  May cause the urine to turn yellow which is normal for B 2 to do and is not a sign that you are dehydrated  (may be an effective preventive medication in some people with migraine)  - Vitamin E which may help with menstrual migraine  There is limited data on this, but it may reduce nausea, photophobia and phonophobia during menstruation    - Will increase patient's topiramate to 50 mg in the morning and 100 mg at bedtime  (reviewed side effects with the patient  Numbness and tingling patient is to increase potassium intake  Prevent kidney stone by drinking about 64 oz of non caffeinated fluids  Helps with weight loss and is a FDA approved weight loss drug  Informed patient that it may cause word-finding difficulty, if that is a problem patient is to call so that we may stop the drug)  - Continue emgality for two more months to see how pt does  If no improvement thus may stop    -will have patient start taking verapamil 40 mg half a tab twice a day for 1 week then 1 tab twice a day after that  - start venlafaxine 25 mg half a tab in a m  For 7 days then 1 tab in a m  For 7 days then 1 tab twice a day after that  Upon follow-up consider changing to long-acting 37 5 mg 2 tabs  Abortive therapy for headaches:   - At the onset of her migraine headache patient is taking Maxalt 10 mg with Skelaxin which does seem to help but she feels she gets a rebound the next day  Will see with her above treatment if that can be prevented    - Consider adding olanzapine if Maxalt continues to cause a rebound headache the next day  Headache management instructions  - When patient has a moderate to severe headache, they should seek rest, initiate relaxation and apply cold compresses to the head  - Maintain regular sleep schedule  Adults need at least 7-8 hours of uninterrupted a night  - Limit over the counter medications such as Tylenol, Ibuprofen, Aleve, Excedrin  (No more than 2- 3 times a week or max 10 a month)  - Maintain headache diary  Free CASE for a smart phone, which can be used is "Migraine fredy"  - Limit caffeine to 1-2 cups 8 to 16 oz a day or less  - Avoid dietary trigger  (aged cheese, peanuts, MSG, aspartame and nitrates)  - Patient is to have regular frequent meals to prevent headache onset  - Please drink at least 64 ounces of water a day to help remain hydrated  Cognitive behavioral therapy (CBT) is a common type of talk therapy (psychotherapy) were you work with a psychotherapist or therapist   CBT helps you become aware of inaccurate or negative thinking so you can view challenging situations more clearly and respond to them in a more effective way  CBT can be a very helpful tool ? either alone or in combination with other therapies ? in treating mental health disorders (depression, post-traumatic stress disorder (PTSD) or an eating disorder) and chronic pain  CBT can be an effective tool to help anyone learn how to better manage stressful life situations and pain  In some cases, CBT is most effective when it's combined with other treatments, such as antidepressants or other medications  You can start yourself by down loading the case: [employer]      Mindfulness/Meditation for Treating Migraine  Many people believe that stress is a major trigger for their migraine  This is where mindfulness and meditation can come into play, as they have been known to help reduce migraine severity, duration and acute pain medication use   It may also help to relieve stress and anxiety while improving feelings of well being  Biofeedback involves becoming more aware of the changes that occur in the body and learning how to exert control over generally involuntary functions  Biofeedback allows you to see your vitals in real-time and learn how to stabilize them on your own  There is great evidence that biofeedback can reduce the frequency, intensity, and duration of migraine and tension-type headache  When you're stressed, you may notice elevated heart rates, tightened muscles, and sweating  During biofeedback, you can see these changes on a monitor, then a therapist teaches you exercises to help manage these changes  Kvng Jeffers for Treating Migraine  The kind of mind/body therapy that yoga can provide may help create relief from migraine  Keeping up with yoga consistently can reduce headache frequency, intensity and duration, so it's important to practice regularly if you plan to use it as a complementary migraine treatment  However, certain types of yoga such as hot yoga may be uncomfortable for people with migraine  Others, such as restorative yoga, may be tolerable even for a patient with chronic migraine  Edmonds Saber can also have a similar benefit for patients with migraine  Specifically, it can help improve balance, which can be very useful for those with vestibular symptoms or vestibular migraine  Acupuncture for Treating Migraine  A traditional [facility] medicine, acupuncture is reported to increase the release of serotonin, dopamine and other chemicals that may help to treat chronic pain, and can be helpful in preventing episodic migraine  There are, however, conflicting results on studies in acupuncture as a treatment for migraine  Importance of Healthy Sleep:  Behavioral sleep changes can promote restful, regular sleep and reduce headache   Simple changes like establishing consistent sleep and wake-up times, as well as getting between 7 and 8 hours of sleep a day, can make a world of difference  Experts also recommend avoiding substances that impair sleep, like caffeine, nicotine and alcohol, and also suggest winding down before bed to prevent sleep problems  To read more go to https://[employer]  org/resource-library/sleep/    Exercising for migraineurs:  Regular exercise can reduce the frequency and intensity of headaches and migraines  When one exercises, the body releases endorphins, which are the bodys natural painkillers  Exercise reduces stress and helps individuals to sleep at night  Exercising at least 30 to 40 minutes 3 times a week is sufficient for most patients  When exercising, follow this plan to prevent headaches:  - First, stay hydrated before, during, and after exercise  - Second part of the exercise plan is to eat sufficient food about an hour and a half before you exercise  Exercise causes ones blood sugar level to decrease, and it is important to have a source of energy    - Final part of the exercise plan is to warm-up  Do not jump into sudden, vigorous exercise if that triggers a headache or migraine  To read more go to https://[employer]  org/resource-library/effects-of-exercise-on-headaches-and-migraines/     Please call with any questions or concerns   Office number is [address] Monitoring Program report was reviewed and was appropriate

## 2020-03-17 PROCEDURE — 93227 XTRNL ECG REC<48 HR R&I: CPT

## 2020-03-18 ENCOUNTER — OFFICE VISIT (OUTPATIENT)
Dept: FAMILY MEDICINE CLINIC | Facility: CLINIC | Age: 40
End: 2020-03-18
Payer: COMMERCIAL

## 2020-03-18 VITALS
HEART RATE: 68 BPM | SYSTOLIC BLOOD PRESSURE: 122 MMHG | OXYGEN SATURATION: 99 % | WEIGHT: 205 LBS | DIASTOLIC BLOOD PRESSURE: 76 MMHG | BODY MASS INDEX: 36.31 KG/M2

## 2020-03-18 DIAGNOSIS — F41.9 ANXIETY: ICD-10-CM

## 2020-03-18 DIAGNOSIS — E04.1 THYROID NODULE: Primary | ICD-10-CM

## 2020-03-18 DIAGNOSIS — G43.911 INTRACTABLE MIGRAINE WITH STATUS MIGRAINOSUS, UNSPECIFIED MIGRAINE TYPE: ICD-10-CM

## 2020-03-18 PROCEDURE — 1036F TOBACCO NON-USER: CPT | Performed by: NURSE PRACTITIONER

## 2020-03-18 PROCEDURE — 99213 OFFICE O/P EST LOW 20 MIN: CPT | Performed by: NURSE PRACTITIONER

## 2020-03-18 RX ORDER — TOPIRAMATE 100 MG/1
100 TABLET, FILM COATED ORAL
Qty: 30 TABLET | Refills: 0 | Status: SHIPPED | OUTPATIENT
Start: 2020-03-18 | End: 2020-04-14

## 2020-03-18 RX ORDER — BUSPIRONE HYDROCHLORIDE 10 MG/1
10 TABLET ORAL 3 TIMES DAILY PRN
Qty: 180 TABLET | Refills: 2 | Status: SHIPPED | OUTPATIENT
Start: 2020-03-18 | End: 2020-04-01 | Stop reason: SDUPTHER

## 2020-03-18 NOTE — PATIENT INSTRUCTIONS
Check tsh second week of April  Continue meds as ordered  Thyroid Nodules   WHAT YOU NEED TO KNOW:   Thyroid nodules are growths on your thyroid gland  Your thyroid makes hormones that help control your body temperature, heart rate, and growth  The hormones also control how fast your body uses food for energy  Some nodules are lumps of tissue, and others are filled with fluid  DISCHARGE INSTRUCTIONS:   Medicines:   · Thyroid medicine  is given to bring your thyroid hormone levels back to a normal range  · Take your medicine as directed  Contact your healthcare provider if you think your medicine is not helping or if you have side effects  Tell him or her if you are allergic to any medicine  Keep a list of the medicines, vitamins, and herbs you take  Include the amounts, and when and why you take them  Bring the list or the pill bottles to follow-up visits  Carry your medicine list with you in case of an emergency  Follow up with your healthcare provider as directed: You may need frequent blood tests to check your thyroid hormone levels  You may also need tests such as an ultrasound to check if any nodules are growing or have returned  Write down your questions so you remember to ask them during your visits  Eat iodine-rich foods:  Examples include fish, seaweed, dairy products, eggs, beans, and lean meat  Iodized salt also contains iodine  You may need to use iodized table salt when you cook and season your food  Iodine may be added to bread or to your drinking water  Ask for a list of foods that contain iodine, and ask how much iodine you need each day  Contact your healthcare provider if:   · You have a new cough that does not improve  · You begin choking or have new or increased trouble swallowing  · Your voice becomes hoarse  · You are losing weight without trying  · You have questions or concerns about your condition or care    Return to the emergency department if:   · You have sudden chest pain or trouble breathing  · Your symptoms worsen, even after you take your medicines  © 2017 [address] [employer] Information is for End User's use only and may not be sold, redistributed or otherwise used for commercial purposes  All illustrations and images included in CareNotes® are the copyrighted property of [employer]  or Anival Alex  The above information is an  only  It is not intended as medical advice for individual conditions or treatments  Talk to your doctor, nurse or pharmacist before following any medical regimen to see if it is safe and effective for you

## 2020-03-18 NOTE — ASSESSMENT & PLAN NOTE
Pt has been taking buspar 3 times a day, scheduled  Reports that she needs it scheduled  Reports that it is helping  Medication reordered

## 2020-03-18 NOTE — ASSESSMENT & PLAN NOTE
Discussed US results, and tsh levels  Pt would like to follow up endocrinology for further evaluation   Referral made

## 2020-03-18 NOTE — PROGRESS NOTES
Assessment/Plan:     Thyroid nodule  Discussed US results, and tsh levels  Pt would like to follow up endocrinology for further evaluation  Referral made    Chronic migraine without aura without status migrainosus, not intractable  Has been seen by Dr Anne Buchanan, migraine specialist  New medications added  Slight improvement    Anxiety  Pt has been taking buspar 3 times a day, scheduled  Reports that she needs it scheduled  Reports that it is helping  Medication reordered  Problem List Items Addressed This Visit        Endocrine    Thyroid nodule - Primary     Discussed US results, and tsh levels  Pt would like to follow up endocrinology for further evaluation  Referral made         Relevant Orders    Ambulatory referral to Endocrinology    TSH, 3rd generation with Free T4 reflex       Other    Anxiety     Pt has been taking buspar 3 times a day, scheduled  Reports that she needs it scheduled  Reports that it is helping  Medication reordered  Relevant Medications    busPIRone (BUSPAR) 10 mg tablet      Other Visit Diagnoses     Intractable migraine with status migrainosus, unspecified migraine type        Relevant Medications    topiramate (TOPAMAX) 100 mg tablet            Subjective:      Patient ID: Jeannette Oreilly is a 45 y o  female  Here to follow up on US of thyroid nodule  Concerned because of family history of cancer       The following portions of the patient's history were reviewed and updated as appropriate: allergies, current medications, past family history, past medical history, past social history, past surgical history and problem list     Review of Systems   Constitutional: Negative  HENT: Negative  Eyes: Negative  Respiratory: Negative  Cardiovascular: Negative  Gastrointestinal: Negative  Endocrine: Negative  Genitourinary: Negative  Musculoskeletal: Negative  Skin: Negative  Allergic/Immunologic: Negative      Neurological: Positive for dizziness, syncope, light-headedness and headaches  Psychiatric/Behavioral: Positive for sleep disturbance  The patient is nervous/anxious  Objective:      /76   Pulse 68   Wt 93 kg (205 lb)   SpO2 99%   BMI 36 31 kg/m²          Physical Exam   Constitutional: She is oriented to person, place, and time  She appears well-developed and well-nourished  HENT:   Head: Normocephalic and atraumatic  Right Ear: External ear normal    Left Ear: External ear normal    Eyes: Pupils are equal, round, and reactive to light  Neck: Normal range of motion  Musculoskeletal: Normal range of motion  Neurological: She is alert and oriented to person, place, and time  Skin: Skin is warm and dry  Psychiatric: She has a normal mood and affect  Her behavior is normal  Judgment and thought content normal    Nursing note and vitals reviewed          Labs:    Lab Results   Component Value Date    WBC 8 57 02/11/2020    HGB 12 1 02/11/2020    HCT 39 7 02/11/2020    MCV 80 (L) 02/11/2020     (H) 02/11/2020     Lab Results   Component Value Date    K 3 4 (L) 02/11/2020     02/11/2020    CO2 26 02/11/2020    BUN 13 02/11/2020    CREATININE 0 87 02/11/2020    CALCIUM 8 8 02/11/2020    AST 25 02/11/2020    ALT 42 02/11/2020    ALKPHOS 73 02/11/2020    EGFR 84 02/11/2020     Lab Results   Component Value Date    CALCIUM 8 8 02/11/2020    K 3 4 (L) 02/11/2020    CO2 26 02/11/2020     02/11/2020    BUN 13 02/11/2020    CREATININE 0 87 02/11/2020

## 2020-03-19 ENCOUNTER — HOSPITAL ENCOUNTER (OUTPATIENT)
Dept: NON INVASIVE DIAGNOSTICS | Facility: CLINIC | Age: 40
Discharge: HOME | End: 2020-03-19
Payer: COMMERCIAL

## 2020-03-19 DIAGNOSIS — R55 SYNCOPE, UNSPECIFIED SYNCOPE TYPE: ICD-10-CM

## 2020-03-19 PROCEDURE — 93306 TTE W/DOPPLER COMPLETE: CPT

## 2020-03-19 PROCEDURE — 93306 TTE W/DOPPLER COMPLETE: CPT | Performed by: INTERNAL MEDICINE

## 2020-03-20 ENCOUNTER — TELEPHONE (OUTPATIENT)
Dept: NEUROLOGY | Facility: CLINIC | Age: 40
End: 2020-03-20

## 2020-03-20 ENCOUNTER — HOSPITAL ENCOUNTER (EMERGENCY)
Facility: HOSPITAL | Age: 40
Discharge: HOME | End: 2020-03-20
Attending: EMERGENCY MEDICINE | Admitting: EMERGENCY MEDICINE
Payer: COMMERCIAL

## 2020-03-20 VITALS
HEIGHT: 63 IN | HEART RATE: 74 BPM | TEMPERATURE: 98.3 F | WEIGHT: 211.64 LBS | SYSTOLIC BLOOD PRESSURE: 125 MMHG | BODY MASS INDEX: 37.5 KG/M2 | DIASTOLIC BLOOD PRESSURE: 74 MMHG | OXYGEN SATURATION: 99 % | RESPIRATION RATE: 15 BRPM

## 2020-03-20 DIAGNOSIS — R55 SYNCOPE: Primary | ICD-10-CM

## 2020-03-20 DIAGNOSIS — I95.1 ORTHOSTATIC HYPOTENSION: ICD-10-CM

## 2020-03-20 LAB
ANION GAP SERPL CALCULATED.3IONS-SCNC: 10 MMOL/L (ref 4–13)
ATRIAL RATE: 59 BPM
BASOPHILS # BLD AUTO: 0.04 THOUSANDS/ΜL (ref 0–0.1)
BASOPHILS NFR BLD AUTO: 1 % (ref 0–1)
BUN SERPL-MCNC: 11 MG/DL (ref 5–25)
CALCIUM SERPL-MCNC: 8.2 MG/DL (ref 8.3–10.1)
CHLORIDE SERPL-SCNC: 108 MMOL/L (ref 100–108)
CO2 SERPL-SCNC: 22 MMOL/L (ref 21–32)
CREAT SERPL-MCNC: 0.81 MG/DL (ref 0.6–1.3)
EOSINOPHIL # BLD AUTO: 0.1 THOUSAND/ΜL (ref 0–0.61)
EOSINOPHIL NFR BLD AUTO: 2 % (ref 0–6)
ERYTHROCYTE [DISTWIDTH] IN BLOOD BY AUTOMATED COUNT: 15 % (ref 11.6–15.1)
EXT PREG TEST URINE: NEGATIVE
EXT. CONTROL ED NAV: NORMAL
GFR SERPL CREATININE-BSD FRML MDRD: 92 ML/MIN/1.73SQ M
GLUCOSE SERPL-MCNC: 82 MG/DL (ref 65–140)
HCT VFR BLD AUTO: 35.4 % (ref 34.8–46.1)
HGB BLD-MCNC: 10.8 G/DL (ref 11.5–15.4)
IMM GRANULOCYTES # BLD AUTO: 0.01 THOUSAND/UL (ref 0–0.2)
IMM GRANULOCYTES NFR BLD AUTO: 0 % (ref 0–2)
LYMPHOCYTES # BLD AUTO: 1.26 THOUSANDS/ΜL (ref 0.6–4.47)
LYMPHOCYTES NFR BLD AUTO: 29 % (ref 14–44)
MAGNESIUM SERPL-MCNC: 1.9 MG/DL (ref 1.6–2.6)
MCH RBC QN AUTO: 24.2 PG (ref 26.8–34.3)
MCHC RBC AUTO-ENTMCNC: 30.5 G/DL (ref 31.4–37.4)
MCV RBC AUTO: 79 FL (ref 82–98)
MONOCYTES # BLD AUTO: 0.4 THOUSAND/ΜL (ref 0.17–1.22)
MONOCYTES NFR BLD AUTO: 9 % (ref 4–12)
NEUTROPHILS # BLD AUTO: 2.49 THOUSANDS/ΜL (ref 1.85–7.62)
NEUTS SEG NFR BLD AUTO: 59 % (ref 43–75)
NRBC BLD AUTO-RTO: 0 /100 WBCS
P AXIS: 68 DEGREES
PLATELET # BLD AUTO: 240 THOUSANDS/UL (ref 149–390)
PMV BLD AUTO: 9.7 FL (ref 8.9–12.7)
POTASSIUM SERPL-SCNC: 3.7 MMOL/L (ref 3.5–5.3)
PR INTERVAL: 124 MS
QRS AXIS: 61 DEGREES
QRSD INTERVAL: 92 MS
QT INTERVAL: 440 MS
QTC INTERVAL: 435 MS
RBC # BLD AUTO: 4.47 MILLION/UL (ref 3.81–5.12)
SODIUM SERPL-SCNC: 140 MMOL/L (ref 136–145)
T WAVE AXIS: 58 DEGREES
TSH SERPL DL<=0.05 MIU/L-ACNC: 1.06 UIU/ML (ref 0.36–3.74)
VENTRICULAR RATE: 59 BPM
WBC # BLD AUTO: 4.3 THOUSAND/UL (ref 4.31–10.16)

## 2020-03-20 PROCEDURE — 83735 ASSAY OF MAGNESIUM: CPT | Performed by: EMERGENCY MEDICINE

## 2020-03-20 PROCEDURE — 93005 ELECTROCARDIOGRAM TRACING: CPT

## 2020-03-20 PROCEDURE — 99285 EMERGENCY DEPT VISIT HI MDM: CPT | Performed by: EMERGENCY MEDICINE

## 2020-03-20 PROCEDURE — 36415 COLL VENOUS BLD VENIPUNCTURE: CPT | Performed by: EMERGENCY MEDICINE

## 2020-03-20 PROCEDURE — 80048 BASIC METABOLIC PNL TOTAL CA: CPT | Performed by: EMERGENCY MEDICINE

## 2020-03-20 PROCEDURE — 85025 COMPLETE CBC W/AUTO DIFF WBC: CPT | Performed by: EMERGENCY MEDICINE

## 2020-03-20 PROCEDURE — 93010 ELECTROCARDIOGRAM REPORT: CPT | Performed by: INTERNAL MEDICINE

## 2020-03-20 PROCEDURE — 81025 URINE PREGNANCY TEST: CPT | Performed by: EMERGENCY MEDICINE

## 2020-03-20 PROCEDURE — 96360 HYDRATION IV INFUSION INIT: CPT

## 2020-03-20 PROCEDURE — 99285 EMERGENCY DEPT VISIT HI MDM: CPT

## 2020-03-20 PROCEDURE — 84443 ASSAY THYROID STIM HORMONE: CPT | Performed by: EMERGENCY MEDICINE

## 2020-03-20 RX ADMIN — SODIUM CHLORIDE 1000 ML: 0.9 INJECTION, SOLUTION INTRAVENOUS at 12:49

## 2020-03-20 NOTE — ED NOTES
D/c reviewed with pt prior to discharged, medications discussed  Ambulatory off unit with steady gait        Corrinne Held, RN  [id number]

## 2020-03-20 NOTE — ED PROVIDER NOTES
History  Chief Complaint   Patient presents with    Migraine     pt with extensive migraine hx the last few months, followed by nureology, started verapamil on the , reporting hypotension and multiple syncopal episodes since  Pt lowered herself to ground prior to event, denies hitting head and states "i went out for like a few seconds"     Syncope     HPI    Prior to Admission Medications   Prescriptions Last Dose Informant Patient Reported? Taking? Galcanezumab-gnlm 120 MG/ML SOAJ Past Month at Unknown time  No Yes   Sig: Inject 120 mg under the skin every 30 (thirty) days   busPIRone (BUSPAR) 10 mg tablet 3/20/2020 at Unknown time  No Yes   Sig: Take 1 tablet (10 mg total) by mouth 3 (three) times a day as needed (anxiety)   ergocalciferol (VITAMIN D2) 50,000 units Not Taking at Unknown time  No No   Sig: Take 1 capsule (50,000 Units total) by mouth 2 (two) times a week for 16 doses   Patient not taking: Reported on 3/20/2020   metaxalone (SKELAXIN) 800 mg tablet 3/19/2020 at Unknown time  No Yes   Sig: Take 1 tablet (800 mg total) by mouth 3 (three) times a day   rizatriptan (MAXALT) 10 MG tablet Past Month at Unknown time  No Yes   Sig: Take 1 tablet (10 mg total) by mouth once as needed for migraine May repeat in 2 hours if needed  Max 2/24 hours, 9/month  topiramate (TOPAMAX) 100 mg tablet 3/20/2020 at Unknown time  No Yes   Sig: Take 1 tablet (100 mg total) by mouth daily at bedtime   topiramate (TOPAMAX) 50 MG tablet 3/20/2020 at Unknown time  No Yes   Si in morning and 2 at bedtime   venlafaxine (EFFEXOR) 25 mg tablet 3/20/2020 at Unknown time  No Yes   Sig: Half a tab in a m  For 7 days then 1 tab in a m   For 7 days then 1 tab twice a day after that   verapamil (CALAN) 40 mg tablet 3/20/2020 at Unknown time  No Yes   Sig: Half a tab twice a day for 7 days then 1 tablet twice a day after that      Facility-Administered Medications: None       Past Medical History:   Diagnosis Date    Anxiety  Ear infection     Migraine     OCD (obsessive compulsive disorder)        Past Surgical History:   Procedure Laterality Date     SECTION      CHOLECYSTECTOMY      EAR SURGERY         Family History   Problem Relation Age of Onset    Arthritis Family     Lung cancer Family     Diabetes Mother     Lung cancer Mother     Meniere's disease Mother     No Known Problems Father      I have reviewed and agree with the history as documented  E-Cigarette/Vaping    E-Cigarette Use Never User      E-Cigarette/Vaping Substances     Social History     Tobacco Use    Smoking status: Never Smoker    Smokeless tobacco: Never Used   Substance Use Topics    Alcohol use: Never     Frequency: Never    Drug use: Never       Review of Systems    Physical Exam  Physical Exam   Constitutional: She is oriented to person, place, and time  She appears well-developed and well-nourished  No distress  HENT:   Head: Normocephalic and atraumatic  Mouth/Throat: Oropharynx is clear and moist    Eyes: Pupils are equal, round, and reactive to light  Conjunctivae and EOM are normal    Neck: Normal range of motion  No tracheal deviation present  Cardiovascular: Normal rate, regular rhythm, normal heart sounds and intact distal pulses  Pulmonary/Chest: Effort normal and breath sounds normal  No respiratory distress  Abdominal: Soft  She exhibits no distension  There is no tenderness  Neurological: She is alert and oriented to person, place, and time  She has normal strength  She displays tremor ( intention of both arms)  No cranial nerve deficit or sensory deficit  Coordination (Normal heel to shin  Intention tremor with finger-nose-finger testing, but no dysmetria) abnormal  GCS eye subscore is 4  GCS verbal subscore is 5  GCS motor subscore is 6  Reflex Scores:       Bicep reflexes are 2+ on the right side and 2+ on the left side         Brachioradialis reflexes are 2+ on the right side and 2+ on the left side        Patellar reflexes are 2+ on the right side and 2+ on the left side  Occasional word-finding difficulties, especially when getting anxious about symptoms  Occasionally mildly slurred speech  Skin: Skin is warm and dry  Psychiatric: She has a normal mood and affect  Her behavior is normal    Nursing note and vitals reviewed  Vital Signs  ED Triage Vitals   Temperature Pulse Respirations Blood Pressure SpO2   03/20/20 1145 03/20/20 1149 03/20/20 1149 03/20/20 1149 03/20/20 1149   98 3 °F (36 8 °C) 62 20 124/83 100 %      Temp Source Heart Rate Source Patient Position - Orthostatic VS BP Location FiO2 (%)   03/20/20 1145 03/20/20 1149 03/20/20 1149 03/20/20 1149 --   Oral Monitor Sitting Right arm       Pain Score       03/20/20 1149       No Pain           Vitals:    03/20/20 1216 03/20/20 1217 03/20/20 1219 03/20/20 1430   BP: 122/64 139/79 129/78 125/74   Pulse: 61 79 68 74   Patient Position - Orthostatic VS: Lying - Orthostatic VS Sitting - Orthostatic VS Standing for 3 minutes - Orthostatic VS Sitting         Visual Acuity  Visual Acuity      Most Recent Value   L Pupil Size (mm)  3   R Pupil Size (mm)  3          ED Medications  Medications   sodium chloride 0 9 % bolus 1,000 mL (0 mL Intravenous Stopped 3/20/20 1349)       Diagnostic Studies  Results Reviewed     Procedure Component Value Units Date/Time    TSH [[id number]]  (Normal) Collected:  03/20/20 1249    Lab Status:  Final result Specimen:  Blood from Arm, Right Updated:  03/20/20 1321     TSH 3RD GENERATON 1 060 uIU/mL     Narrative:       Patients undergoing fluorescein dye angiography may retain small amounts of fluorescein in the body for 48-72 hours post procedure  Samples containing fluorescein can produce falsely depressed TSH values  If the patient had this procedure,a specimen should be resubmitted post fluorescein clearance        Basic metabolic panel [[id number]]  (Abnormal) Collected:  03/20/20 1249    Lab Status:  Final result Specimen:  Blood from Arm, Right Updated:  03/20/20 1321     Sodium 140 mmol/L      Potassium 3 7 mmol/L      Chloride 108 mmol/L      CO2 22 mmol/L      ANION GAP 10 mmol/L      BUN 11 mg/dL      Creatinine 0 81 mg/dL      Glucose 82 mg/dL      Calcium 8 2 mg/dL      eGFR 92 ml/min/1 73sq m     Narrative:       National Kidney Disease Foundation guidelines for Chronic Kidney Disease (CKD):     Stage 1 with normal or high GFR (GFR > 90 mL/min/1 73 square meters)    Stage 2 Mild CKD (GFR = 60-89 mL/min/1 73 square meters)    Stage 3A Moderate CKD (GFR = 45-59 mL/min/1 73 square meters)    Stage 3B Moderate CKD (GFR = 30-44 mL/min/1 73 square meters)    Stage 4 Severe CKD (GFR = 15-29 mL/min/1 73 square meters)    Stage 5 End Stage CKD (GFR <15 mL/min/1 73 square meters)  Note: GFR calculation is accurate only with a steady state creatinine    Magnesium [[id number]]  (Normal) Collected:  03/20/20 1249    Lab Status:  Final result Specimen:  Blood from Arm, Right Updated:  03/20/20 1321     Magnesium 1 9 mg/dL     CBC and differential [[id number]]  (Abnormal) Collected:  03/20/20 1249    Lab Status:  Final result Specimen:  Blood from Arm, Right Updated:  03/20/20 1258     WBC 4 30 Thousand/uL      RBC 4 47 Million/uL      Hemoglobin 10 8 g/dL      Hematocrit 35 4 %      MCV 79 fL      MCH 24 2 pg      MCHC 30 5 g/dL      RDW 15 0 %      MPV 9 7 fL      Platelets 716 Thousands/uL      nRBC 0 /100 WBCs      Neutrophils Relative 59 %      Immat GRANS % 0 %      Lymphocytes Relative 29 %      Monocytes Relative 9 %      Eosinophils Relative 2 %      Basophils Relative 1 %      Neutrophils Absolute 2 49 Thousands/µL      Immature Grans Absolute 0 01 Thousand/uL      Lymphocytes Absolute 1 26 Thousands/µL      Monocytes Absolute 0 40 Thousand/µL      Eosinophils Absolute 0 10 Thousand/µL      Basophils Absolute 0 04 Thousands/µL     POCT pregnancy, urine [[id number]]  (Normal) Resulted:  03/20/20 1234    Lab Status:  Final result Updated:  03/20/20 1234     EXT PREG TEST UR (Ref: Negative) Negative     Control Valid                 No orders to display              Procedures  ECG 12 Lead Documentation Only  Date/Time: 3/20/2020 12:44 PM  Performed by: Sandy Osuna MD  Authorized by: Sandy Osuna MD     Indications / Diagnosis:  Syncope  ECG reviewed by me, the ED Provider: yes    Patient location:  ED  Previous ECG:     Previous ECG:  Compared to current    Comparison ECG info:  1/24/20    Similarity:  No change  Interpretation:     Interpretation: non-specific    Rate:     ECG rate:  59    ECG rate assessment: bradycardic    Rhythm:     Rhythm: sinus bradycardia    Ectopy:     Ectopy: none    QRS:     QRS axis:  Normal    QRS intervals:  Normal  Conduction:     Conduction: normal    ST segments:     ST segments:  Normal  T waves:     T waves: normal               ED Course                                 MDM  Number of Diagnoses or Management Options  Orthostatic hypotension: new and requires workup  Syncope: new and requires workup  Diagnosis management comments: This is a 44-year-old female who presents here today with a syncopal event  She has been having frequent, severe migraines for the past several months, with associated arm tremors, some slurred speech, and word-finding difficulties  She has had extensive workup by Neurology  She has had several near syncopal episodes in the setting of onset of headaches  She was seen by Dr Adryan Douglas on 03/16, and was started on venlafaxine and verapamil, and had her Topamax dose increased  She started the venlafaxine on 03/17, and the verapamil on 03/18  She says this morning she was sitting at the kitchen table when she began feeling lightheaded, felt a wave of heat, , and that she was going to pass out  She called out to her son and told him to call his aunt  She sat down on the ground and passed out for a few seconds    She did not fall and denies any injuries from this  She says every time she went to sit up she felt like she was going to pass out again, so was lying on the ground until her sister arrived  She says that at baseline her blood pressure is in the one teens so does run slightly low  She denies any other new medications or changes in medications, any over-the-counter medication use  She denies any chest pain, palpitations, focal weakness or numbness with this  She states her migraine is at the baseline 2/10 and was not significant enough to have caused her episode of syncope today  She endorses photophobia and phonophobia, and mild nausea without vomiting, which is typical for her headaches  She states she recently started her menstrual period and is thus far normal for her  She has been told she is mildly anemic previously, but never required transfusion or other intervention for it  She has had 48 hour Holter monitor which showed heart rate , being sinus bradycardia and no prolonged episodes of bradycardia, and had an echocardiogram yesterday that was unremarkable  She has had MRI and MRA as part of workup by Neurology, without any acute abnormalities  Other than migraines, she denies any other medical problems  She has no episodes of syncope outside of with her migraines  Review of systems:  Otherwise negative unless stated as above    She is well-appearing, in no acute distress  She does have occasional word-finding issues  She has an intention tremor but no focal neurologic deficits  Exam is otherwise unremarkable  Based on description of symptoms, with worsening while sitting upright suspicion is highest for orthostatic hypotension  Given recently starting verapamil, this is presumably culprit, superimposed on her low normal blood pressure at baseline    We will check orthostatics here, check EKG to evaluate for underlying dysrhythmias, lab work to evaluate for signs of anemia or electrolyte abnormalities, thyroid abnormalities contributing to her symptoms  Orthostatic vital signs did not show significant change is, though she did have symptoms while doing so  She is low normal magnesium of 1 9  She is mildly anemic to 10 8  Labs are otherwise unremarkable  She does feel better after IV fluids, and was able to ambulate without recurrence of symptoms  I discussed with the patient findings, continued symptomatic treatment at home, need for discussion with her neurologist regarding continuation of the verapamil, follow-up with PCP and possibly with Cardiology for further evaluation, and indications for return, and she expresses understanding with this plan  Amount and/or Complexity of Data Reviewed  Clinical lab tests: reviewed and ordered  Decide to obtain previous medical records or to obtain history from someone other than the patient: yes  Review and summarize past medical records: yes  Independent visualization of images, tracings, or specimens: yes          Disposition  Final diagnoses:   Syncope   Orthostatic hypotension     Time reflects when diagnosis was documented in both MDM as applicable and the Disposition within this note     Time User Action Codes Description Comment    3/20/2020  2:22 PM Jesus Alvarenga Add [R55] Syncope     3/20/2020  2:22 PM Jesus Alvarenga Add [I95 1] Orthostatic hypotension       ED Disposition     ED Disposition Condition Date/Time Comment    Discharge Good Fri Mar 20, 2020  2:22 PM Jeannette Oreilly discharge to home/self care        Follow-up Information     Follow up With Specialties Details Why Contact Info Additional Information    [facility] Schedule an appointment as soon as possible for a visit  to follow up on your symptoms [address] [facility] Dorian Nelson [address] [facility]       Candida Bauman MD Neurology Call  to discuss continuing the Verapamil in the setting of your syncope [phone] [facility] [phone] [address] Ruba Gray [facility] Schedule an appointment as soon as possible for a visit  As needed, to follow up on your epsidoes of syncope [address] [phone] [address] [facility] [address] [facility], [place], [address] [phone]          Discharge Medication List as of 3/20/2020  2:29 PM      CONTINUE these medications which have NOT CHANGED    Details   busPIRone (BUSPAR) 10 mg tablet Take 1 tablet (10 mg total) by mouth 3 (three) times a day as needed (anxiety), Starting Wed 3/18/2020, Normal      Galcanezumab-gnlm 120 MG/ML SOAJ Inject 120 mg under the skin every 30 (thirty) days, Starting Wed 2/19/2020, Normal      metaxalone (SKELAXIN) 800 mg tablet Take 1 tablet (800 mg total) by mouth 3 (three) times a day, Starting Tue 2/11/2020, Normal      rizatriptan (MAXALT) 10 MG tablet Take 1 tablet (10 mg total) by mouth once as needed for migraine May repeat in 2 hours if needed  Max 2/24 hours, 9/month , Starting Wed 2/19/2020, Normal      !! topiramate (TOPAMAX) 100 mg tablet Take 1 tablet (100 mg total) by mouth daily at bedtime, Starting Wed 3/18/2020, Normal      !! topiramate (TOPAMAX) 50 MG tablet 1 in morning and 2 at bedtime, Normal      venlafaxine (EFFEXOR) 25 mg tablet Half a tab in a m  For 7 days then 1 tab in a m  For 7 days then 1 tab twice a day after that, Normal      verapamil (CALAN) 40 mg tablet Half a tab twice a day for 7 days then 1 tablet twice a day after that, Normal      ergocalciferol (VITAMIN D2) 50,000 units Take 1 capsule (50,000 Units total) by mouth 2 (two) times a week for 16 doses, Starting Thu 2/27/2020, Until Tue 4/21/2020, Normal       !! - Potential duplicate medications found  Please discuss with provider  No discharge procedures on file      PDMP Review     None ED Provider  Electronically Signed by           Maddison Alex MD  03/20/20 3871

## 2020-03-20 NOTE — DISCHARGE INSTRUCTIONS
Drink plenty of fluids to keep herself hydrated  Take over-the-counter magnesium, given your low normal levels here  Call your neurologist to discuss continuing the wrapper male given near syncope today  Follow-up with your primary care doctor for further evaluation of her symptoms  If you continue to have problems, follow up with Cardiology for further evaluation  Return if you have any injuries from passing out, have a hard time walking around because your so lightheaded, or for any other concerns

## 2020-03-20 NOTE — TELEPHONE ENCOUNTER
Pt called and states that she is currently in the ER d/t syncopal episode  This episode occurred at today around 12 pm  Prior to episode, she was just sitting and felt hot and going to passed out, passed out for few seconds  Woke up slightly disoriented  ER thinks verapamil is the cause of syncope episode  /50 (baseline per pt)  Currently taking verapamil 40 mg 1/2 tab bid  Asking if she should discontinue verapamil?  Pls advise          [phone] ok to leave detailed message

## 2020-03-20 NOTE — ED NOTES
Patient bed mobility was independent  Patient ambulated independently for 120 feet, patient's only complaint was that she felt a little shaky but stated "I am fine"        Gail [facility]  03/20/20 8612

## 2020-03-21 DIAGNOSIS — G43.909 MIGRAINE: ICD-10-CM

## 2020-03-23 ENCOUNTER — SOCIAL WORK (OUTPATIENT)
Dept: BEHAVIORAL/MENTAL HEALTH CLINIC | Facility: CLINIC | Age: 40
End: 2020-03-23
Payer: COMMERCIAL

## 2020-03-23 DIAGNOSIS — F41.0 PANIC DISORDER WITHOUT AGORAPHOBIA: ICD-10-CM

## 2020-03-23 DIAGNOSIS — F42.9 OBSESSIVE-COMPULSIVE DISORDER, UNSPECIFIED TYPE: Primary | ICD-10-CM

## 2020-03-23 DIAGNOSIS — F41.9 ANXIETY: ICD-10-CM

## 2020-03-23 PROCEDURE — 90791 PSYCH DIAGNOSTIC EVALUATION: CPT | Performed by: SOCIAL WORKER

## 2020-03-23 PROCEDURE — 1036F TOBACCO NON-USER: CPT | Performed by: SOCIAL WORKER
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
## 2020-03-23 NOTE — BH TREATMENT PLAN
Camilla Valentine  1980       Date of Initial Treatment Plan: 3/23/2020   Date of Current Treatment Plan: 03/23/20    Treatment Plan Number 1     Strengths/Personal Resources for Self Care: family, education, relatively independent, strong willed    Diagnosis:   1  Obsessive-compulsive disorder, unspecified type     2  Anxiety  Ambulatory referral to behavioral health therapists   3  Panic disorder without agoraphobia         Area of Needs: anxiety, panic attacks, ocd      Long Term Goal 1: A - I want to stop spinning out of control,  I want to be grounded, and eventually be able to return to work    Target Date:9/23/2020  Completion Date: n/a         Short Term Objectives for Goal 1:  Anxiety- Reduce frequency, intensity and duration of anxiety so that daily functioning is not impaired  1) Describe worry and anxiety and how it impacts daily functioning  2) Educate on cognitive and psychological, behavioral components of anxiety  3) Learn and Implement calming skills  4) Develop positive self talk  5) Learn and Implement problem solving strategies to reduce worry  6) Identify possible origins of anxiety  7) Identify and express feelings in therapy  8) Complete homework assignments that reduce symptoms  9) Increase participation in positive activities  10) Communicate thoughts and feelings to supportive others in life  11) Utilize support system    GOAL 1: Modality: Individual 2-4x per month   Completion Date n/a    GOAL 2: Modality: The person(s) responsible for carrying out the plan is  Antonia and Dr Eliseo Don: Diagnosis and Treatment Plan explained to Daron Oh relates understanding diagnosis and is agreeable to Treatment Plan         Client Comments : Please share your thoughts, feelings, need and/or experiences regarding your treatment plan: n/a

## 2020-03-24 RX ORDER — TOPIRAMATE 50 MG/1
TABLET, FILM COATED ORAL
Qty: 60 TABLET | Refills: 0 | Status: SHIPPED | OUTPATIENT
Start: 2020-03-24 | End: 2020-05-04 | Stop reason: SDUPTHER

## 2020-03-27 ENCOUNTER — TELEPHONE (OUTPATIENT)
Dept: PHYSICAL THERAPY | Facility: CLINIC | Age: 40
End: 2020-03-27

## 2020-03-27 NOTE — TELEPHONE ENCOUNTER
Pt has been on hold from therapy - she reports Dr Janay Velazquez has adjusted some medications  She wants to remain on hold due to COVID guidelines  Overall she is feeling okay, and states she will reach out in the future to schedule more appts if needed

## 2020-04-01 ENCOUNTER — TELEMEDICINE (OUTPATIENT)
Dept: PSYCHIATRY | Facility: CLINIC | Age: 40
End: 2020-04-01
Payer: COMMERCIAL

## 2020-04-01 DIAGNOSIS — F41.1 GAD (GENERALIZED ANXIETY DISORDER): ICD-10-CM

## 2020-04-01 DIAGNOSIS — F42.9 OBSESSIVE-COMPULSIVE DISORDER WITH GOOD OR FAIR INSIGHT: ICD-10-CM

## 2020-04-01 PROCEDURE — 99244 OFF/OP CNSLTJ NEW/EST MOD 40: CPT | Performed by: PSYCHIATRY & NEUROLOGY

## 2020-04-01 PROCEDURE — 1036F TOBACCO NON-USER: CPT | Performed by: PSYCHIATRY & NEUROLOGY

## 2020-04-01 RX ORDER — LORAZEPAM 0.5 MG/1
0.5 TABLET ORAL DAILY PRN
Qty: 30 TABLET | Refills: 0 | Status: SHIPPED | OUTPATIENT
Start: 2020-04-01 | End: 2020-04-29 | Stop reason: SDUPTHER

## 2020-04-01 RX ORDER — VENLAFAXINE HYDROCHLORIDE 75 MG/1
75 CAPSULE, EXTENDED RELEASE ORAL DAILY
Qty: 30 CAPSULE | Refills: 1 | Status: SHIPPED | OUTPATIENT
Start: 2020-04-01 | End: 2020-04-29 | Stop reason: SDUPTHER

## 2020-04-01 RX ORDER — BUSPIRONE HYDROCHLORIDE 10 MG/1
10 TABLET ORAL 3 TIMES DAILY PRN
Qty: 180 TABLET | Refills: 2 | Status: SHIPPED | OUTPATIENT
Start: 2020-04-01 | End: 2020-06-08 | Stop reason: SDUPTHER

## 2020-04-02 ENCOUNTER — TELEMEDICINE (OUTPATIENT)
Dept: BEHAVIORAL/MENTAL HEALTH CLINIC | Facility: CLINIC | Age: 40
End: 2020-04-02
Payer: COMMERCIAL

## 2020-04-02 DIAGNOSIS — F41.0 PANIC DISORDER WITHOUT AGORAPHOBIA: ICD-10-CM

## 2020-04-02 DIAGNOSIS — F42.9 OBSESSIVE-COMPULSIVE DISORDER WITH GOOD OR FAIR INSIGHT: Primary | ICD-10-CM

## 2020-04-02 PROCEDURE — 1036F TOBACCO NON-USER: CPT | Performed by: SOCIAL WORKER

## 2020-04-02 PROCEDURE — 90832 PSYTX W PT 30 MINUTES: CPT | Performed by: SOCIAL WORKER

## 2020-04-14 DIAGNOSIS — G43.911 INTRACTABLE MIGRAINE WITH STATUS MIGRAINOSUS, UNSPECIFIED MIGRAINE TYPE: ICD-10-CM

## 2020-04-14 RX ORDER — TOPIRAMATE 100 MG/1
TABLET, FILM COATED ORAL
Qty: 30 TABLET | Refills: 0 | Status: SHIPPED | OUTPATIENT
Start: 2020-04-14 | End: 2020-06-01

## 2020-04-28 ENCOUNTER — TELEMEDICINE (OUTPATIENT)
Dept: BEHAVIORAL/MENTAL HEALTH CLINIC | Facility: CLINIC | Age: 40
End: 2020-04-28
Payer: COMMERCIAL

## 2020-04-28 DIAGNOSIS — F42.9 OBSESSIVE-COMPULSIVE DISORDER WITH GOOD OR FAIR INSIGHT: Primary | ICD-10-CM

## 2020-04-28 DIAGNOSIS — F41.0 PANIC DISORDER WITHOUT AGORAPHOBIA: ICD-10-CM

## 2020-04-28 PROCEDURE — 90832 PSYTX W PT 30 MINUTES: CPT | Performed by: SOCIAL WORKER

## 2020-04-29 ENCOUNTER — TELEMEDICINE (OUTPATIENT)
Dept: PSYCHIATRY | Facility: CLINIC | Age: 40
End: 2020-04-29
Payer: COMMERCIAL

## 2020-04-29 DIAGNOSIS — F41.1 GAD (GENERALIZED ANXIETY DISORDER): ICD-10-CM

## 2020-04-29 DIAGNOSIS — F42.9 OBSESSIVE-COMPULSIVE DISORDER WITH GOOD OR FAIR INSIGHT: ICD-10-CM

## 2020-04-29 PROCEDURE — 99214 OFFICE O/P EST MOD 30 MIN: CPT | Performed by: PSYCHIATRY & NEUROLOGY

## 2020-04-29 RX ORDER — VENLAFAXINE HYDROCHLORIDE 75 MG/1
75 CAPSULE, EXTENDED RELEASE ORAL DAILY
Qty: 30 CAPSULE | Refills: 1 | Status: SHIPPED | OUTPATIENT
Start: 2020-04-29 | End: 2020-05-26 | Stop reason: SDUPTHER

## 2020-04-29 RX ORDER — VENLAFAXINE HYDROCHLORIDE 37.5 MG/1
37.5 CAPSULE, EXTENDED RELEASE ORAL DAILY
Qty: 30 CAPSULE | Refills: 0 | Status: SHIPPED | OUTPATIENT
Start: 2020-04-29 | End: 2020-05-26

## 2020-04-29 RX ORDER — LORAZEPAM 0.5 MG/1
0.5 TABLET ORAL DAILY PRN
Qty: 30 TABLET | Refills: 0 | Status: SHIPPED | OUTPATIENT
Start: 2020-04-29 | End: 2020-06-08 | Stop reason: SDUPTHER

## 2020-05-04 DIAGNOSIS — G43.909 MIGRAINE: ICD-10-CM

## 2020-05-05 ENCOUNTER — TELEMEDICINE (OUTPATIENT)
Dept: BEHAVIORAL/MENTAL HEALTH CLINIC | Facility: CLINIC | Age: 40
End: 2020-05-05
Payer: COMMERCIAL

## 2020-05-05 DIAGNOSIS — F42.9 OBSESSIVE-COMPULSIVE DISORDER WITH GOOD OR FAIR INSIGHT: Primary | ICD-10-CM

## 2020-05-05 DIAGNOSIS — F41.0 PANIC DISORDER WITHOUT AGORAPHOBIA: ICD-10-CM

## 2020-05-05 PROCEDURE — 90832 PSYTX W PT 30 MINUTES: CPT | Performed by: SOCIAL WORKER

## 2020-05-05 RX ORDER — TOPIRAMATE 50 MG/1
50 TABLET, FILM COATED ORAL DAILY
Qty: 60 TABLET | Refills: 0 | Status: SHIPPED | OUTPATIENT
Start: 2020-05-05 | End: 2020-05-06 | Stop reason: CLARIF

## 2020-05-15 DIAGNOSIS — G43.911 INTRACTABLE MIGRAINE WITH STATUS MIGRAINOSUS, UNSPECIFIED MIGRAINE TYPE: ICD-10-CM

## 2020-05-21 ENCOUNTER — TELEPHONE (OUTPATIENT)
Dept: FAMILY MEDICINE CLINIC | Facility: CLINIC | Age: 40
End: 2020-05-21

## 2020-05-23 DIAGNOSIS — F41.1 GAD (GENERALIZED ANXIETY DISORDER): ICD-10-CM

## 2020-05-23 DIAGNOSIS — F42.9 OBSESSIVE-COMPULSIVE DISORDER WITH GOOD OR FAIR INSIGHT: ICD-10-CM

## 2020-05-26 ENCOUNTER — OFFICE VISIT (OUTPATIENT)
Dept: FAMILY MEDICINE CLINIC | Facility: CLINIC | Age: 40
End: 2020-05-26
Payer: COMMERCIAL

## 2020-05-26 VITALS
OXYGEN SATURATION: 99 % | HEART RATE: 82 BPM | TEMPERATURE: 97.6 F | HEIGHT: 63 IN | WEIGHT: 207 LBS | SYSTOLIC BLOOD PRESSURE: 122 MMHG | BODY MASS INDEX: 36.68 KG/M2 | DIASTOLIC BLOOD PRESSURE: 76 MMHG

## 2020-05-26 DIAGNOSIS — F42.9 OBSESSIVE-COMPULSIVE DISORDER WITH GOOD OR FAIR INSIGHT: ICD-10-CM

## 2020-05-26 DIAGNOSIS — E55.9 HYPOVITAMINOSIS D: ICD-10-CM

## 2020-05-26 DIAGNOSIS — E04.1 THYROID NODULE: Primary | ICD-10-CM

## 2020-05-26 DIAGNOSIS — Z00.00 HEALTH CARE MAINTENANCE: ICD-10-CM

## 2020-05-26 DIAGNOSIS — G43.709 CHRONIC MIGRAINE WITHOUT AURA WITHOUT STATUS MIGRAINOSUS, NOT INTRACTABLE: ICD-10-CM

## 2020-05-26 DIAGNOSIS — F41.1 GAD (GENERALIZED ANXIETY DISORDER): ICD-10-CM

## 2020-05-26 DIAGNOSIS — Z13.220 SCREENING FOR LIPID DISORDERS: ICD-10-CM

## 2020-05-26 DIAGNOSIS — E78.1 HIGH TRIGLYCERIDES: ICD-10-CM

## 2020-05-26 PROCEDURE — 1036F TOBACCO NON-USER: CPT | Performed by: NURSE PRACTITIONER

## 2020-05-26 PROCEDURE — 3008F BODY MASS INDEX DOCD: CPT | Performed by: NURSE PRACTITIONER

## 2020-05-26 PROCEDURE — 99214 OFFICE O/P EST MOD 30 MIN: CPT | Performed by: NURSE PRACTITIONER

## 2020-05-26 RX ORDER — VENLAFAXINE HYDROCHLORIDE 37.5 MG/1
37.5 CAPSULE, EXTENDED RELEASE ORAL DAILY
Qty: 30 CAPSULE | Refills: 0 | Status: SHIPPED | OUTPATIENT
Start: 2020-05-26 | End: 2020-06-08 | Stop reason: ALTCHOICE

## 2020-05-26 RX ORDER — TOPIRAMATE 100 MG/1
TABLET, FILM COATED ORAL
Qty: 30 TABLET | Refills: 0 | OUTPATIENT
Start: 2020-05-26

## 2020-05-26 RX ORDER — TOPIRAMATE 50 MG/1
TABLET, FILM COATED ORAL
COMMUNITY
Start: 2020-05-11 | End: 2020-06-01

## 2020-05-26 RX ORDER — METOCLOPRAMIDE 10 MG/1
10 TABLET ORAL AS NEEDED
COMMUNITY
End: 2020-09-22 | Stop reason: SDUPTHER

## 2020-05-26 RX ORDER — VENLAFAXINE HYDROCHLORIDE 75 MG/1
75 CAPSULE, EXTENDED RELEASE ORAL DAILY
Qty: 30 CAPSULE | Refills: 1 | Status: SHIPPED | OUTPATIENT
Start: 2020-05-26 | End: 2020-06-08 | Stop reason: SDUPTHER

## 2020-06-01 ENCOUNTER — PATIENT MESSAGE (OUTPATIENT)
Dept: NEUROLOGY | Facility: CLINIC | Age: 40
End: 2020-06-01

## 2020-06-01 DIAGNOSIS — G43.709 CHRONIC MIGRAINE WITHOUT AURA WITHOUT STATUS MIGRAINOSUS, NOT INTRACTABLE: Primary | ICD-10-CM

## 2020-06-01 RX ORDER — TOPIRAMATE 50 MG/1
TABLET, FILM COATED ORAL
Qty: 270 TABLET | Refills: 1 | Status: SHIPPED | OUTPATIENT
Start: 2020-06-01 | End: 2020-06-26 | Stop reason: SDUPTHER

## 2020-06-02 ENCOUNTER — TELEMEDICINE (OUTPATIENT)
Dept: BEHAVIORAL/MENTAL HEALTH CLINIC | Facility: CLINIC | Age: 40
End: 2020-06-02
Payer: COMMERCIAL

## 2020-06-02 DIAGNOSIS — F42.9 OBSESSIVE-COMPULSIVE DISORDER WITH GOOD OR FAIR INSIGHT: Primary | ICD-10-CM

## 2020-06-02 DIAGNOSIS — F41.0 PANIC DISORDER WITHOUT AGORAPHOBIA: ICD-10-CM

## 2020-06-02 PROCEDURE — 90834 PSYTX W PT 45 MINUTES: CPT | Performed by: SOCIAL WORKER

## 2020-06-08 ENCOUNTER — TELEMEDICINE (OUTPATIENT)
Dept: PSYCHIATRY | Facility: CLINIC | Age: 40
End: 2020-06-08
Payer: COMMERCIAL

## 2020-06-08 DIAGNOSIS — F42.9 OBSESSIVE-COMPULSIVE DISORDER WITH GOOD OR FAIR INSIGHT: ICD-10-CM

## 2020-06-08 DIAGNOSIS — F41.1 GAD (GENERALIZED ANXIETY DISORDER): ICD-10-CM

## 2020-06-08 PROCEDURE — 90833 PSYTX W PT W E/M 30 MIN: CPT | Performed by: PSYCHIATRY & NEUROLOGY

## 2020-06-08 PROCEDURE — 99214 OFFICE O/P EST MOD 30 MIN: CPT | Performed by: PSYCHIATRY & NEUROLOGY

## 2020-06-08 RX ORDER — VENLAFAXINE HYDROCHLORIDE 150 MG/1
150 CAPSULE, EXTENDED RELEASE ORAL DAILY
Qty: 30 CAPSULE | Refills: 0 | Status: SHIPPED | OUTPATIENT
Start: 2020-06-08 | End: 2020-07-14

## 2020-06-08 RX ORDER — LORAZEPAM 0.5 MG/1
0.5 TABLET ORAL DAILY PRN
Qty: 30 TABLET | Refills: 0 | Status: SHIPPED | OUTPATIENT
Start: 2020-06-08 | End: 2020-09-18 | Stop reason: SDUPTHER

## 2020-06-08 RX ORDER — BUSPIRONE HYDROCHLORIDE 15 MG/1
15 TABLET ORAL 3 TIMES DAILY PRN
Qty: 90 TABLET | Refills: 0 | Status: SHIPPED | OUTPATIENT
Start: 2020-06-08 | End: 2020-07-01

## 2020-06-09 ENCOUNTER — TELEMEDICINE (OUTPATIENT)
Dept: BEHAVIORAL/MENTAL HEALTH CLINIC | Facility: CLINIC | Age: 40
End: 2020-06-09
Payer: COMMERCIAL

## 2020-06-09 DIAGNOSIS — F41.0 PANIC DISORDER WITHOUT AGORAPHOBIA: ICD-10-CM

## 2020-06-09 DIAGNOSIS — F42.9 OBSESSIVE-COMPULSIVE DISORDER WITH GOOD OR FAIR INSIGHT: Primary | ICD-10-CM

## 2020-06-09 PROCEDURE — 90834 PSYTX W PT 45 MINUTES: CPT | Performed by: SOCIAL WORKER

## 2020-06-16 ENCOUNTER — TELEMEDICINE (OUTPATIENT)
Dept: BEHAVIORAL/MENTAL HEALTH CLINIC | Facility: CLINIC | Age: 40
End: 2020-06-16
Payer: COMMERCIAL

## 2020-06-16 DIAGNOSIS — F41.0 PANIC DISORDER WITHOUT AGORAPHOBIA: Primary | ICD-10-CM

## 2020-06-16 DIAGNOSIS — F42.9 OBSESSIVE-COMPULSIVE DISORDER WITH GOOD OR FAIR INSIGHT: ICD-10-CM

## 2020-06-16 PROCEDURE — 90834 PSYTX W PT 45 MINUTES: CPT | Performed by: SOCIAL WORKER

## 2020-06-25 ENCOUNTER — TELEPHONE (OUTPATIENT)
Dept: NEUROLOGY | Facility: CLINIC | Age: 40
End: 2020-06-25

## 2020-06-26 ENCOUNTER — OFFICE VISIT (OUTPATIENT)
Dept: NEUROLOGY | Facility: CLINIC | Age: 40
End: 2020-06-26
Payer: COMMERCIAL

## 2020-06-26 VITALS
WEIGHT: 203.8 LBS | TEMPERATURE: 99.9 F | DIASTOLIC BLOOD PRESSURE: 61 MMHG | HEART RATE: 83 BPM | BODY MASS INDEX: 36.11 KG/M2 | HEIGHT: 63 IN | SYSTOLIC BLOOD PRESSURE: 129 MMHG

## 2020-06-26 DIAGNOSIS — R25.1 TREMOR: ICD-10-CM

## 2020-06-26 DIAGNOSIS — G43.709 CHRONIC MIGRAINE WITHOUT AURA WITHOUT STATUS MIGRAINOSUS, NOT INTRACTABLE: ICD-10-CM

## 2020-06-26 DIAGNOSIS — F41.9 ANXIETY: Primary | ICD-10-CM

## 2020-06-26 DIAGNOSIS — M62.838 MUSCLE SPASMS OF BOTH LOWER EXTREMITIES: ICD-10-CM

## 2020-06-26 PROBLEM — G44.52 NEW DAILY PERSISTENT HEADACHE: Status: RESOLVED | Noted: 2020-03-16 | Resolved: 2020-06-26

## 2020-06-26 PROCEDURE — 1036F TOBACCO NON-USER: CPT | Performed by: PSYCHIATRY & NEUROLOGY

## 2020-06-26 PROCEDURE — 3008F BODY MASS INDEX DOCD: CPT | Performed by: PSYCHIATRY & NEUROLOGY

## 2020-06-26 PROCEDURE — 99214 OFFICE O/P EST MOD 30 MIN: CPT | Performed by: PSYCHIATRY & NEUROLOGY

## 2020-06-26 RX ORDER — METAXALONE 800 MG/1
800 TABLET ORAL 3 TIMES DAILY
Qty: 90 TABLET | Refills: 0 | Status: SHIPPED | OUTPATIENT
Start: 2020-06-26

## 2020-06-26 RX ORDER — TOPIRAMATE 100 MG/1
TABLET, FILM COATED ORAL
Qty: 180 TABLET | Refills: 3 | Status: SHIPPED | OUTPATIENT
Start: 2020-06-26 | End: 2020-09-22 | Stop reason: SDUPTHER

## 2020-07-01 DIAGNOSIS — F41.1 GAD (GENERALIZED ANXIETY DISORDER): ICD-10-CM

## 2020-07-01 RX ORDER — BUSPIRONE HYDROCHLORIDE 15 MG/1
15 TABLET ORAL 3 TIMES DAILY PRN
Qty: 90 TABLET | Refills: 0 | Status: SHIPPED | OUTPATIENT
Start: 2020-07-01 | End: 2020-07-27

## 2020-07-08 ENCOUNTER — TELEMEDICINE (OUTPATIENT)
Dept: BEHAVIORAL/MENTAL HEALTH CLINIC | Facility: CLINIC | Age: 40
End: 2020-07-08
Payer: COMMERCIAL

## 2020-07-08 DIAGNOSIS — F42.9 OBSESSIVE-COMPULSIVE DISORDER WITH GOOD OR FAIR INSIGHT: Primary | ICD-10-CM

## 2020-07-08 PROCEDURE — 90832 PSYTX W PT 30 MINUTES: CPT | Performed by: SOCIAL WORKER

## 2020-07-08 PROCEDURE — 1036F TOBACCO NON-USER: CPT | Performed by: SOCIAL WORKER
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
## 2020-07-11 DIAGNOSIS — F41.1 GAD (GENERALIZED ANXIETY DISORDER): ICD-10-CM

## 2020-07-11 DIAGNOSIS — F42.9 OBSESSIVE-COMPULSIVE DISORDER WITH GOOD OR FAIR INSIGHT: ICD-10-CM

## 2020-07-14 RX ORDER — VENLAFAXINE HYDROCHLORIDE 150 MG/1
CAPSULE, EXTENDED RELEASE ORAL
Qty: 30 CAPSULE | Refills: 0 | Status: SHIPPED | OUTPATIENT
Start: 2020-07-14 | End: 2020-08-10

## 2020-07-27 DIAGNOSIS — F41.1 GAD (GENERALIZED ANXIETY DISORDER): ICD-10-CM

## 2020-07-27 RX ORDER — BUSPIRONE HYDROCHLORIDE 15 MG/1
15 TABLET ORAL 3 TIMES DAILY PRN
Qty: 90 TABLET | Refills: 0 | Status: SHIPPED | OUTPATIENT
Start: 2020-07-27 | End: 2020-09-18 | Stop reason: SDUPTHER

## 2020-07-28 ENCOUNTER — TELEPHONE (OUTPATIENT)
Dept: BEHAVIORAL/MENTAL HEALTH CLINIC | Facility: CLINIC | Age: 40
End: 2020-07-28

## 2020-07-28 NOTE — TELEPHONE ENCOUNTER
Pt not signed into [address] [place] [address] not completed within required time limits due to: Jaz Hugo  no show appointment on 7/28/2020

## 2020-07-29 ENCOUNTER — TELEPHONE (OUTPATIENT)
Dept: NEUROLOGY | Facility: CLINIC | Age: 40
End: 2020-07-29

## 2020-07-29 NOTE — TELEPHONE ENCOUNTER
Called patient and left a message informing her that her appointment time has changed for her 09/22/20 appointment to 09:45am from 10:00am

## 2020-08-10 DIAGNOSIS — F42.9 OBSESSIVE-COMPULSIVE DISORDER WITH GOOD OR FAIR INSIGHT: ICD-10-CM

## 2020-08-10 DIAGNOSIS — F41.1 GAD (GENERALIZED ANXIETY DISORDER): ICD-10-CM

## 2020-08-10 RX ORDER — VENLAFAXINE HYDROCHLORIDE 150 MG/1
CAPSULE, EXTENDED RELEASE ORAL
Qty: 30 CAPSULE | Refills: 0 | Status: SHIPPED | OUTPATIENT
Start: 2020-08-10 | End: 2020-09-18 | Stop reason: SDUPTHER

## 2020-09-18 DIAGNOSIS — F42.9 OBSESSIVE-COMPULSIVE DISORDER WITH GOOD OR FAIR INSIGHT: ICD-10-CM

## 2020-09-18 DIAGNOSIS — F41.1 GAD (GENERALIZED ANXIETY DISORDER): ICD-10-CM

## 2020-09-18 NOTE — TELEPHONE ENCOUNTER
Patient called requesting refill be sent to pharmacy    She is also asking for a call back about medication adjustment

## 2020-09-21 ENCOUNTER — TELEPHONE (OUTPATIENT)
Dept: NEUROLOGY | Facility: CLINIC | Age: 40
End: 2020-09-21

## 2020-09-21 RX ORDER — VENLAFAXINE HYDROCHLORIDE 150 MG/1
150 CAPSULE, EXTENDED RELEASE ORAL DAILY
Qty: 30 CAPSULE | Refills: 0 | Status: SHIPPED | OUTPATIENT
Start: 2020-09-21 | End: 2020-10-26

## 2020-09-21 RX ORDER — LORAZEPAM 0.5 MG/1
0.5 TABLET ORAL DAILY PRN
Qty: 30 TABLET | Refills: 0 | Status: SHIPPED | OUTPATIENT
Start: 2020-09-21 | End: 2022-07-05

## 2020-09-21 RX ORDER — BUSPIRONE HYDROCHLORIDE 15 MG/1
15 TABLET ORAL 3 TIMES DAILY PRN
Qty: 90 TABLET | Refills: 0 | Status: SHIPPED | OUTPATIENT
Start: 2020-09-21 | End: 2020-10-26

## 2020-09-21 NOTE — PROGRESS NOTES
[facility]  PATIENT:  Karla Lassiter  MRN:  [id number]  :  1980  DATE OF SERVICE:  2020      Assessment/Plan:      Problem List Items Addressed This Visit        Cardiovascular and Mediastinum    Chronic migraine without aura without status migrainosus, not intractable    Relevant Medications    divalproex sodium (DEPAKOTE) 500 mg EC tablet    topiramate (TOPAMAX) 100 mg tablet    metoclopramide (REGLAN) 10 mg tablet              Cervicalgia:  - continue Skelaxin as needed  Basic neck exercises for daily use:    - Neck pathology and poor posture, with straightening of the normal cervical lordosis, can cause headaches  Tightening of the neck muscles can irritate the nerves in the occipital region of the head and cause or worsen head pain  Thus neck strengthening and relaxation exercises, can help improve this particular pain  It is importance to have good posture for improving shoulder, neck, and head pain  - Here are some exercises which should take 5 minutes:     1  Standing, drop your head to one side while continuing to look ahead  Hold for 10 seconds then swap sides  Repeat twice more each side  To increase the stretch, drop the opposite shoulder  2  Standing again, lower your chin to your chest, hold for 10 and then look up to the ceiling and hold for 10  Repeat twice more  3  Next, standing straight again, look over your right shoulder and hold firm for 10 seconds, then over your left shoulder for 10  Repeat this 3 times  4  Finally, while sitting upright, bring your head forward and hold for 10, then all the way back and hold for 10  If this simple exercise does not help improve the posture, we will consider formal physical therapy in the future         New daily persistent headache  Chronic migraine headaches with and without aura:  Preventive therapy for headaches:   Reproductive age women: Should take folic acid daily when taking anti-seizure drugs especially Depakote   -Over-the-counter supplements: to decrease intensity and frequency of migraines  - Magnesium Oxide 400mg a day  If any diarrhea or upset stomach, decrease dose  as tolerated  (oral magnesium oxide may be an effective preventive strategy for people with migraine  Some theories about how it works include the idea that magnesium can help to prevent waves of cortical spreading depression and aura  Magnesium, in theory, also reduces the release of inflammatory or activating chemicals that can cause migraine)  - Vitamin B2 200 mg twice a day  May cause the urine to turn yellow which is normal for B 2 to do and is not a sign that you are dehydrated  (may be an effective preventive medication in some people with migraine)  - Vitamin E which may help with menstrual migraine  There is limited data on this, but it may reduce nausea, photophobia and phonophobia during menstruation  - continue topiramate 100 mg twice a day  - Emgality 120 mg injection monthly  - continue venlafaxine 150 mg once a day  Abortive therapy for headaches:   - during the day patient may take Reglan with Skelaxin- if needed she is taking ativan 0 5mg half a tab and that seems to help as well  Headache management instructions  - When patient has a moderate to severe headache, they should seek rest, initiate relaxation and apply cold compresses to the head  - Maintain regular sleep schedule  Adults need at least 7-8 hours of uninterrupted a night  - Limit over the counter medications such as Tylenol, Ibuprofen, Aleve, Excedrin  (No more than 2- 3 times a week or max 10 a month)  - Maintain headache diary  Free CJ for a smart phone, which can be used is "Migraine fredy"  - Limit caffeine to 1-2 cups 8 to 16 oz a day or less  - Avoid dietary trigger  (aged cheese, peanuts, MSG, aspartame and nitrates)  - Patient is to have regular frequent meals to prevent headache onset      - Please drink at least 64 ounces of water a day to help remain hydrated  Importance of Healthy Sleep:  Behavioral sleep changes can promote restful, regular sleep and reduce headache  Simple changes like establishing consistent sleep and wake-up times, as well as getting between 7 and 8 hours of sleep a day, can make a world of difference  Experts also recommend avoiding substances that impair sleep, like caffeine, nicotine and alcohol, and also suggest winding down before bed to prevent sleep problems  To read more go to https://americanmigrainefoundation  org/resource-library/sleep/    Exercising for migraineurs:  Regular exercise can reduce the frequency and intensity of headaches and migraines  When one exercises, the body releases endorphins, which are the bodys natural painkillers  Exercise reduces stress and helps individuals to sleep at night  Exercising at least 30 to 40 minutes 3 times a week is sufficient for most patients  When exercising, follow this plan to prevent headaches:  - First, stay hydrated before, during, and after exercise  - Second part of the exercise plan is to eat sufficient food about an hour and a half before you exercise  Exercise causes ones blood sugar level to decrease, and it is important to have a source of energy    - Final part of the exercise plan is to warm-up  Do not jump into sudden, vigorous exercise if that triggers a headache or migraine  To read more go to https://americanmigrainefoundation  org/resource-library/effects-of-exercise-on-headaches-and-migraines/     Please call with any questions or concerns  Office number is [phone]           History of Present Illness: We had the pleasure of evaluating Leonor Manuel in neurological follow up today for headaches  As you know,  she is a 45 y o  left handed  female  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 Yes in the past  Will be seeing them again   Seeing at therapist/ how often? Yes, will be seeing them again    Headaches:   What medications do you take or have you taken for your headaches? Preventive therapy:   - vitamin-D, magnesium sulfate 2 g,  - Emgality - she has only done one thus far  - topiramate 100 mg twice a day , Depakote 500/1 g mg, gabapentin 100 mg,  - BuSpar 10 mg,  - Lexapro (became manic on this), venlafaxine 150 mg a day  - lorazepam 0 5 mg, Valium 5 mg,  - Skelaxin 800 mg, Flexeril 5 mg t i d ,  - Benadryl 50 mg,  Abortive Therapy:   - indomethacin 50 mg, Toradol 30 mg,  - Maxalt 10 mg, sumatriptan 50 mg,  - Fioricet,  - Compazine 10 mg, Zofran 4 mg, Reglan 10 mg,  - prednisone 20 mg, methylprednisolone,           What is your current pain level? 4/10    How often do the headaches occur? Mild headaches: 1-2 a week  Moderate to severe headaches:1 a week to 2-3 before emglaity is due    Are you ever headache free? Yes     Aura/Warning and how long does it last?  - blurry vision for an hour and then headache gets worse - this occurs 2-3 times a week    What time of the day do the headaches start? Mild headaches: in am when she wakes up  Moderate to severe headaches: 2-4 pm    How long do the headaches last?   Mild headaches:few hours  Moderate to severe headaches: Ice water, iece pack, at 6-7/10 she takes skelaken    Where is your headache located? Mild headaches: diffuse, facial, neck  Moderate to severe headaches: left frontal, temporal but sometime on the right side    Describe your usual headache?   Mild headaches: pressure, dull, achy  Moderate to severe headaches: throbbing and pounding, at time stabbing on the left temple    What is the intensity of pain? Mild headaches: baseline pain - 2-3/10 and with exertion can get up to 5/10  Moderate to severe headaches: 8/10    Associated symptoms:   - Decreased appetite   Nausea       - Photophobia     Phonophobia      Osmophobia  - pale  - Stiff or sore neck   - Dizziness   light headed  - Problems with concentration  - Blurred vision     - word finding difficulty, balance problem  - left hand tremor-brought on by anxiety headache  - Insomnia  - Prefer to be in a cool, quiet, dark room    Number of days missed per month because of headaches:  Work (or school) days: has not worked for the last few months  Social or Family activities:  often    Headache are worse if the patient: cough, sneeze, bending over, exertion  Headache triggers:  overstimulation  What time of the year do headaches occur more frequently? none    Have you had trigger point injection performed and how often? No  Have you had Botox injection performed and how often? No   Have you had epidural injections or transforaminal injections performed? No    Alternative therapies used in the past for headaches? physical therapy  Have you used CBD or THC for your headaches and how often? No  How many caffeine products to drink a day? 30  How much water to drink a day? 16 oz - 4 a day    Are you current pregnant or planning on getting pregnant? Done the family planning    Have you ever had any Brain imaging? Yes  Recent laboratory data was reviewed  Medications and allergies were reviewed  03/03/2020-MRI cervical spine:  IMPRESSION:   1   Mild spondylosis without cord compression or cord signal abnormality  2   Indeterminant left-sided thyroid nodule, requiring Further characterization with thyroid ultrasound    Incidental thyroid nodule(s) for which nonemergent thyroid ultrasound is recommended  02/14/2020-MRI of the brain with without contrast:  IMPRESSION:   No significant interval change since recent examination   No mass effect, acute intracranial hemorrhage or evidence of recent infarction  No abnormal parenchymal or leptomeningeal enhancement identified  03/03/2020-MRA of the brain:  IMPRESSION:   No intracranial aneurysm or major intracranial arterial stenosis  02/17/2020-EEG routine:  IMPRESSION:    This is a normal routine EEG   If a seizure disorder is considered clinically a repeat tracing with sleep deprivation may be of additional diagnostic value                                                                Past Medical History:   Diagnosis Date    Anxiety     Ear infection     Migraine     OCD (obsessive compulsive disorder)        Patient Active Problem List   Diagnosis    Sinus bradycardia    Leukocytosis    Postural dizziness with presyncope    Encounter to establish care    Anxiety    At risk for nutrition deficiency    Screening for lipid disorders    Tremor    Syncope    Hypovitaminosis D    Iron deficiency anemia    Speech abnormality    Chronic migraine without aura without status migrainosus, not intractable    Thyroid nodule    Obsessive-compulsive disorder with good or fair insight    Panic disorder without agoraphobia    High triglycerides    Health care maintenance       Medications:      Current Outpatient Medications   Medication Sig Dispense Refill    BIOTIN PO Take 1 tablet by mouth daily      busPIRone (BUSPAR) 15 mg tablet Take 1 tablet (15 mg total) by mouth 3 (three) times a day as needed (anxiety) 90 tablet 0    Galcanezumab-gnlm 120 MG/ML SOAJ Inject 120 mg under the skin every 30 (thirty) days 1 pen 11    LORazepam (ATIVAN) 0 5 mg tablet Take 1 tablet (0 5 mg total) by mouth daily as needed for anxiety 30 tablet 0    metaxalone (SKELAXIN) 800 mg tablet Take 1 tablet (800 mg total) by mouth 3 (three) times a day 90 tablet 0    metoclopramide (REGLAN) 10 mg tablet 1 at the onset of a migraine headache or nausea t i d  p r n  10 tablet 1    topiramate (TOPAMAX) 100 mg tablet 1 tabs in a m  and 1 at bedtime 180 tablet 3    venlafaxine (EFFEXOR-XR) 150 mg 24 hr capsule Take 1 capsule (150 mg total) by mouth daily 30 capsule 0    divalproex sodium (DEPAKOTE) 500 mg EC tablet One at night time as needed 30 tablet 1     No current facility-administered medications for this visit  Allergies:       Allergies   Allergen Reactions    Levaquin [Levofloxacin] Hallucinations    Lexapro [Escitalopram]        Family History:     Family History   Problem Relation Age of Onset    Arthritis Family     Lung cancer Family     Diabetes Mother     Lung cancer Mother     Meniere's disease Mother     No Known Problems Father        Social History:     Social History     Socioeconomic History    Marital status: /Civil Union     Spouse name: Not on file    Number of children: Not on file    Years of education: Not on file    Highest education level: Not on file   Occupational History    Not on file   Social Needs    Financial resource strain: Not on file    Food insecurity     Worry: Not on file     Inability: Not on file   [employer] needs     Medical: Not on file     Non-medical: Not on file   Tobacco Use    Smoking status: Never Smoker    Smokeless tobacco: Never Used   Substance and Sexual Activity    Alcohol use: Yes     Comment: Occasionally     Drug use: Never    Sexual activity: Not on file   Lifestyle    Physical activity     Days per week: Not on file     Minutes per session: Not on file    Stress: Not on file   Relationships    Social connections     Talks on phone: Not on file     Gets together: Not on file     Attends Sikhism service: Not on file     Active member of club or organization: Not on file     Attends meetings of clubs or organizations: Not on file     Relationship status: Not on file    Intimate partner violence     Fear of current or ex partner: Not on file     Emotionally abused: Not on file     Physically abused: Not on file Forced sexual activity: Not on file   Other Topics Concern    Not on file   Social History Narrative    Daily Coffee    Daily Tea         Objective:   Physical Exam:                                                                   Vitals:            /88 (BP Location: Left arm, Patient Position: Sitting, Cuff Size: Standard)   Pulse 66   Temp (!) 97 1 °F (36 2 °C) (Temporal)   Ht 5' 3" (1 6 m)   Wt 92 4 kg (203 lb 12 8 oz) Comment: Patient wearing heavy boots for work  BMI 36 10 kg/m²   BP Readings from Last 3 Encounters:   09/22/20 126/88   06/26/20 129/61   05/26/20 122/76     Pulse Readings from Last 3 Encounters:   09/22/20 66   06/26/20 83   05/26/20 82            CONSTITUTIONAL: Well developed, well nourished, well groomed  No dysmorphic features  Eyes:  PERRLA, EOM normal      Neck:  Normal ROM, neck supple  HEENT:  Normocephalic atraumatic  No meningismus  Oropharynx is clear and moist  No oral mucosal lesions  Chest:  Respirations regular and unlabored  Cardiovascular:  Distal extremities warm without palpable edema or tenderness, no observed significant swelling  Musculoskeletal:  Full range of motion  Skin:  warm and dry   Psychiatric:  Normal behavior and appropriate affect      Neurological Examination:   Mental status/cognitive function: Orientated to time, place and person  Cranial Nerves: 2 to 12 intact    Motor Exam:  Moving all extremities without any difficulty    Sensory:  Intact to light touch throughout    Reflexes:  Not checked today    Coordination: Finger to nose intact bilaterally, no tremor noted    Gait: - Steady casual gait         Review of Systems:   Review of Systems  Review of Systems   Constitutional: Positive for appetite change  HENT: Negative  Eyes: Positive for photophobia and pain  Respiratory: Negative  Cardiovascular: Negative  Gastrointestinal: Negative  Endocrine: Negative  Genitourinary: Negative  Musculoskeletal: Positive for neck stiffness  Skin: Negative  Allergic/Immunologic: Negative  Neurological: Positive for dizziness, tremors and headaches  Hematological: Negative  Psychiatric/Behavioral: Positive for decreased concentration and sleep disturbance  The patient is nervous/anxious  I have spent 25 minutes with Patient  today in which greater than 50% of this time was spent in counseling/coordination of care regarding Diagnostic results, Prognosis, Risks and benefits of [place] options,  instructions for management, Patient and family education, Importance of TX compliance, Risk factor reductions, Impressions and Plan of care as above           Author:  Barak Puente MD 9/22/2020 11:24 AM

## 2020-09-22 ENCOUNTER — TELEPHONE (OUTPATIENT)
Dept: NEUROLOGY | Facility: CLINIC | Age: 40
End: 2020-09-22

## 2020-09-22 ENCOUNTER — OFFICE VISIT (OUTPATIENT)
Dept: NEUROLOGY | Facility: CLINIC | Age: 40
End: 2020-09-22
Payer: COMMERCIAL

## 2020-09-22 VITALS
BODY MASS INDEX: 36.11 KG/M2 | HEART RATE: 66 BPM | DIASTOLIC BLOOD PRESSURE: 88 MMHG | TEMPERATURE: 97.1 F | HEIGHT: 63 IN | SYSTOLIC BLOOD PRESSURE: 126 MMHG | WEIGHT: 203.8 LBS

## 2020-09-22 DIAGNOSIS — G43.709 CHRONIC MIGRAINE WITHOUT AURA WITHOUT STATUS MIGRAINOSUS, NOT INTRACTABLE: ICD-10-CM

## 2020-09-22 PROCEDURE — 1036F TOBACCO NON-USER: CPT | Performed by: PSYCHIATRY & NEUROLOGY

## 2020-09-22 PROCEDURE — 99214 OFFICE O/P EST MOD 30 MIN: CPT | Performed by: PSYCHIATRY & NEUROLOGY

## 2020-09-22 RX ORDER — METOCLOPRAMIDE 10 MG/1
TABLET ORAL
Qty: 10 TABLET | Refills: 1 | Status: SHIPPED | OUTPATIENT
Start: 2020-09-22 | End: 2022-06-13 | Stop reason: SDUPTHER

## 2020-09-22 RX ORDER — TOPIRAMATE 100 MG/1
TABLET, FILM COATED ORAL
Qty: 180 TABLET | Refills: 3 | Status: SHIPPED | OUTPATIENT
Start: 2020-09-22 | End: 2021-09-20 | Stop reason: SDUPTHER

## 2020-09-22 RX ORDER — DIVALPROEX SODIUM 500 MG/1
TABLET, DELAYED RELEASE ORAL
Qty: 30 TABLET | Refills: 1 | Status: SHIPPED | OUTPATIENT
Start: 2020-09-22 | End: 2020-12-15 | Stop reason: SDUPTHER

## 2020-09-22 NOTE — TELEPHONE ENCOUNTER
Received a fax from [facility] stating emgality requires PA   (Key: [id number]    I called patient  She is still taking emgality  Only had 4-5 migraine days per month after being on emgality for 6 months  Prior to emgality, she was having greater than 25 migraine days per month       Submitted on [facility]

## 2020-09-22 NOTE — LETTER
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

## 2020-09-22 NOTE — PROGRESS NOTES
Review of Systems   Constitutional: Positive for appetite change  HENT: Negative  Eyes: Positive for photophobia and pain  Respiratory: Negative  Cardiovascular: Negative  Gastrointestinal: Negative  Endocrine: Negative  Genitourinary: Negative  Musculoskeletal: Positive for neck stiffness  Skin: Negative  Allergic/Immunologic: Negative  Neurological: Positive for dizziness, tremors and headaches  Hematological: Negative  Psychiatric/Behavioral: Positive for decreased concentration and sleep disturbance  The patient is nervous/anxious

## 2020-09-22 NOTE — PATIENT INSTRUCTIONS
Low B12 at 267-patient should be taking B12 sublingual at this time  Vitamin A-  elevated at 64 1  May decrease the intake of vitamin A  As a may cause as patient did develop idiopathic intracranial hypertension  Vitamin-D deficiency:  19 8-continue taking 5000 International Units on daily basis  OCD/anxiety:  Patient will be seeing high psychiatrist and therapist     Vitor Dimas:  - continue Skelaxin as needed  Basic neck exercises for daily use:    - Neck pathology and poor posture, with straightening of the normal cervical lordosis, can cause headaches  Tightening of the neck muscles can irritate the nerves in the occipital region of the head and cause or worsen head pain  Thus neck strengthening and relaxation exercises, can help improve this particular pain  It is importance to have good posture for improving shoulder, neck, and head pain  - Here are some exercises which should take 5 minutes:     1  Standing, drop your head to one side while continuing to look ahead  Hold for 10 seconds then swap sides  Repeat twice more each side  To increase the stretch, drop the opposite shoulder  2  Standing again, lower your chin to your chest, hold for 10 and then look up to the ceiling and hold for 10  Repeat twice more  3  Next, standing straight again, look over your right shoulder and hold firm for 10 seconds, then over your left shoulder for 10  Repeat this 3 times  4  Finally, while sitting upright, bring your head forward and hold for 10, then all the way back and hold for 10  If this simple exercise does not help improve the posture, we will consider formal physical therapy in the future         New daily persistent headache  Chronic migraine headaches with and without aura:  Preventive therapy for headaches:   Reproductive age women: Should take folic acid daily when taking anti-seizure drugs especially Depakote   -Over-the-counter supplements: to decrease intensity and frequency of migraines  - Magnesium Oxide 400mg a day  If any diarrhea or upset stomach, decrease dose  as tolerated  (oral magnesium oxide may be an effective preventive strategy for people with migraine  Some theories about how it works include the idea that magnesium can help to prevent waves of cortical spreading depression and aura  Magnesium, in theory, also reduces the release of inflammatory or activating chemicals that can cause migraine)  - Vitamin B2 200 mg twice a day  May cause the urine to turn yellow which is normal for B 2 to do and is not a sign that you are dehydrated  (may be an effective preventive medication in some people with migraine)  - Vitamin E which may help with menstrual migraine  There is limited data on this, but it may reduce nausea, photophobia and phonophobia during menstruation  - continue topiramate 100 mg twice a day  - Emgality 120 mg injection monthly  - continue venlafaxine 150 mg once a day  Abortive therapy for headaches:   - during the day patient may take Reglan with Skelaxin- if needed she is taking ativan 0 5mg half a tab and that seems to help as well  Headache management instructions  - When patient has a moderate to severe headache, they should seek rest, initiate relaxation and apply cold compresses to the head  - Maintain regular sleep schedule  Adults need at least 7-8 hours of uninterrupted a night  - Limit over the counter medications such as Tylenol, Ibuprofen, Aleve, Excedrin  (No more than 2- 3 times a week or max 10 a month)  - Maintain headache diary  Free CJ for a smart phone, which can be used is "Migraine fredy"  - Limit caffeine to 1-2 cups 8 to 16 oz a day or less  - Avoid dietary trigger  (aged cheese, peanuts, MSG, aspartame and nitrates)  - Patient is to have regular frequent meals to prevent headache onset  - Please drink at least 64 ounces of water a day to help remain hydrated      Importance of Healthy Sleep:  Behavioral sleep changes can promote restful, regular sleep and reduce headache  Simple changes like establishing consistent sleep and wake-up times, as well as getting between 7 and 8 hours of sleep a day, can make a world of difference  Experts also recommend avoiding substances that impair sleep, like caffeine, nicotine and alcohol, and also suggest winding down before bed to prevent sleep problems  To read more go to https://[employer]  org/resource-library/sleep/    Exercising for migraineurs:  Regular exercise can reduce the frequency and intensity of headaches and migraines  When one exercises, the body releases endorphins, which are the bodys natural painkillers  Exercise reduces stress and helps individuals to sleep at night  Exercising at least 30 to 40 minutes 3 times a week is sufficient for most patients  When exercising, follow this plan to prevent headaches:  - First, stay hydrated before, during, and after exercise  - Second part of the exercise plan is to eat sufficient food about an hour and a half before you exercise  Exercise causes ones blood sugar level to decrease, and it is important to have a source of energy    - Final part of the exercise plan is to warm-up  Do not jump into sudden, vigorous exercise if that triggers a headache or migraine  To read more go to https://[employer]  org/resource-library/effects-of-exercise-on-headaches-and-migraines/     Please call with any questions or concerns   Office number is [phone]

## 2020-10-23 DIAGNOSIS — F42.9 OBSESSIVE-COMPULSIVE DISORDER WITH GOOD OR FAIR INSIGHT: ICD-10-CM

## 2020-10-23 DIAGNOSIS — F41.1 GAD (GENERALIZED ANXIETY DISORDER): ICD-10-CM

## 2020-10-26 RX ORDER — VENLAFAXINE HYDROCHLORIDE 150 MG/1
CAPSULE, EXTENDED RELEASE ORAL
Qty: 30 CAPSULE | Refills: 0 | Status: SHIPPED | OUTPATIENT
Start: 2020-10-26 | End: 2022-07-05

## 2020-10-26 RX ORDER — BUSPIRONE HYDROCHLORIDE 15 MG/1
15 TABLET ORAL 3 TIMES DAILY PRN
Qty: 90 TABLET | Refills: 0 | Status: SHIPPED | OUTPATIENT
Start: 2020-10-26 | End: 2020-12-22

## 2020-12-15 DIAGNOSIS — G43.709 CHRONIC MIGRAINE WITHOUT AURA WITHOUT STATUS MIGRAINOSUS, NOT INTRACTABLE: ICD-10-CM

## 2020-12-15 RX ORDER — DIVALPROEX SODIUM 500 MG/1
TABLET, DELAYED RELEASE ORAL
Qty: 30 TABLET | Refills: 3 | Status: SHIPPED | OUTPATIENT
Start: 2020-12-15 | End: 2021-04-15

## 2020-12-15 NOTE — TELEPHONE ENCOUNTER
Medication refill check list    Correct patient? yes   Correct medication name, dose, and pill size? yes   Correct provider? yes   Last and Next appt  scheduled? Yes, last date 9/22/20 & next date 9/20/21   Right pharmacy listed? yes   Correct quantity for 30 or 90 days? yes   Is the patient out of refills? When was it last prescribed? Yes, last date 9/22/20   Directions match what the patient says they are taking?  yes   Enough refills? (none for controlled substances, 1 year for routine medications) yes

## 2020-12-18 DIAGNOSIS — G43.709 CHRONIC MIGRAINE WITHOUT AURA WITHOUT STATUS MIGRAINOSUS, NOT INTRACTABLE: Primary | ICD-10-CM

## 2020-12-18 RX ORDER — TOPIRAMATE 50 MG/1
TABLET, FILM COATED ORAL
Qty: 270 TABLET | Refills: 1 | Status: SHIPPED | OUTPATIENT
Start: 2020-12-18 | End: 2021-03-24

## 2020-12-22 DIAGNOSIS — F41.1 GAD (GENERALIZED ANXIETY DISORDER): ICD-10-CM

## 2020-12-22 RX ORDER — BUSPIRONE HYDROCHLORIDE 15 MG/1
15 TABLET ORAL 3 TIMES DAILY PRN
Qty: 90 TABLET | Refills: 1 | Status: SHIPPED | OUTPATIENT
Start: 2020-12-22 | End: 2021-09-20

## 2021-03-24 ENCOUNTER — OFFICE VISIT (OUTPATIENT)
Dept: NEUROLOGY | Facility: CLINIC | Age: 41
End: 2021-03-24
Payer: COMMERCIAL

## 2021-03-24 VITALS
SYSTOLIC BLOOD PRESSURE: 136 MMHG | WEIGHT: 195 LBS | BODY MASS INDEX: 34.55 KG/M2 | TEMPERATURE: 98.1 F | DIASTOLIC BLOOD PRESSURE: 93 MMHG | HEART RATE: 81 BPM | HEIGHT: 63 IN

## 2021-03-24 DIAGNOSIS — G43.709 CHRONIC MIGRAINE WITHOUT AURA WITHOUT STATUS MIGRAINOSUS, NOT INTRACTABLE: ICD-10-CM

## 2021-03-24 DIAGNOSIS — F41.0 PANIC DISORDER WITHOUT AGORAPHOBIA: ICD-10-CM

## 2021-03-24 DIAGNOSIS — F41.9 ANXIETY: ICD-10-CM

## 2021-03-24 DIAGNOSIS — G43.709 CHRONIC MIGRAINE WITHOUT AURA WITHOUT STATUS MIGRAINOSUS, NOT INTRACTABLE: Primary | ICD-10-CM

## 2021-03-24 PROBLEM — F43.10 PTSD (POST-TRAUMATIC STRESS DISORDER): Status: ACTIVE | Noted: 2021-03-24

## 2021-03-24 PROCEDURE — 99215 OFFICE O/P EST HI 40 MIN: CPT | Performed by: PHYSICIAN ASSISTANT

## 2021-03-24 PROCEDURE — 99417 PROLNG OP E/M EACH 15 MIN: CPT | Performed by: PHYSICIAN ASSISTANT

## 2021-03-24 PROCEDURE — 96372 THER/PROPH/DIAG INJ SC/IM: CPT | Performed by: PHYSICIAN ASSISTANT

## 2021-03-24 RX ORDER — VENLAFAXINE HYDROCHLORIDE 37.5 MG/1
37.5 CAPSULE, EXTENDED RELEASE ORAL DAILY
COMMUNITY
End: 2022-07-05

## 2021-03-24 RX ORDER — KETOROLAC TROMETHAMINE 30 MG/ML
60 INJECTION, SOLUTION INTRAMUSCULAR; INTRAVENOUS ONCE
Status: COMPLETED | OUTPATIENT
Start: 2021-03-24 | End: 2021-03-24

## 2021-03-24 RX ORDER — PROCHLORPERAZINE EDISYLATE 5 MG/ML
10 INJECTION INTRAMUSCULAR; INTRAVENOUS ONCE
Status: COMPLETED | OUTPATIENT
Start: 2021-03-24 | End: 2021-03-24

## 2021-03-24 RX ORDER — RIMEGEPANT SULFATE 75 MG/75MG
75 TABLET, ORALLY DISINTEGRATING ORAL AS NEEDED
Qty: 8 TABLET | Refills: 3 | Status: SHIPPED | OUTPATIENT
Start: 2021-03-24 | End: 2022-03-14

## 2021-03-24 RX ORDER — FREMANEZUMAB-VFRM 225 MG/1.5ML
INJECTION SUBCUTANEOUS
Qty: 4.5 PEN | Refills: 4 | Status: SHIPPED | OUTPATIENT
Start: 2021-03-24 | End: 2022-06-10

## 2021-03-24 RX ORDER — DEXAMETHASONE 2 MG/1
2 TABLET ORAL
Qty: 5 TABLET | Refills: 0 | Status: SHIPPED | OUTPATIENT
Start: 2021-03-24 | End: 2022-07-05 | Stop reason: SDUPTHER

## 2021-03-24 RX ADMIN — PROCHLORPERAZINE EDISYLATE 10 MG: 5 INJECTION INTRAMUSCULAR; INTRAVENOUS at 14:58

## 2021-03-24 RX ADMIN — KETOROLAC TROMETHAMINE 60 MG: 30 INJECTION, SOLUTION INTRAMUSCULAR; INTRAVENOUS at 14:58

## 2021-03-24 NOTE — PROGRESS NOTES
[facility] [facility]  PATIENT:  Mj Rodas  MRN:  [id number]  :  1980  DATE OF SERVICE:  3/24/2021      Assessment/Plan:     Chronic migraine without aura without status migrainosus, not intractable  Preventive therapy for headaches:   Reproductive age women: Should take folic acid daily when taking anti-seizure drugs especially Depakote   -Over-the-counter supplements: to decrease intensity and frequency of migraines  - Magnesium Oxide 400mg a day  If any diarrhea or upset stomach, decrease dose  as tolerated  (oral magnesium oxide may be an effective preventive strategy for people with migraine  Some theories about how it works include the idea that magnesium can help to prevent waves of cortical spreading depression and aura  Magnesium, in theory, also reduces the release of inflammatory or activating chemicals that can cause migraine)  - Vitamin B2 200 mg twice a day  May cause the urine to turn yellow which is normal for B 2 to do and is not a sign that you are dehydrated  (may be an effective preventive medication in some people with migraine)  - Vitamin E which may help with menstrual migraine  There is limited data on this, but it may reduce nausea, photophobia and phonophobia during menstruation  - continue topiramate 100 mg twice a day  - Ajovy 3 injections every 3 months  - continue venlafaxine 150 mg once a day  Abortive therapy for headaches:   - At onset of Nurtec 75 mg  Limit 1 in 24 hours  Depakote 500 mg at bedtime for 7 nights  If no improvement Decadron 2 mg for 5 days in the am  during the day patient may take Reglan with Skelaxin- if needed she is taking ativan 0 5mg half a tab and that seems to help as well          Given Toradol 60 mg and Prochlorperazine 10 mg IM in office today for intractable migraine  Problem List Items Addressed This Visit        Cardiovascular and Mediastinum    Chronic migraine without aura without status migrainosus, not intractable - Primary     Preventive therapy for headaches:   Reproductive age women: Should take folic acid daily when taking anti-seizure drugs especially Depakote   -Over-the-counter supplements: to decrease intensity and frequency of migraines  - Magnesium Oxide 400mg a day  If any diarrhea or upset stomach, decrease dose  as tolerated  (oral magnesium oxide may be an effective preventive strategy for people with migraine  Some theories about how it works include the idea that magnesium can help to prevent waves of cortical spreading depression and aura  Magnesium, in theory, also reduces the release of inflammatory or activating chemicals that can cause migraine)  - Vitamin B2 200 mg twice a day  May cause the urine to turn yellow which is normal for B 2 to do and is not a sign that you are dehydrated  (may be an effective preventive medication in some people with migraine)  - Vitamin E which may help with menstrual migraine  There is limited data on this, but it may reduce nausea, photophobia and phonophobia during menstruation  - continue topiramate 100 mg twice a day  - Ajovy 3 injections every 3 months  - continue venlafaxine 150 mg once a day  Abortive therapy for headaches:   - At onset of Nurtec 75 mg  Limit 1 in 24 hours  Depakote 500 mg at bedtime for 7 nights  If no improvement Decadron 2 mg for 5 days in the am  during the day patient may take Reglan with Skelaxin- if needed she is taking ativan 0 5mg half a tab and that seems to help as well            Relevant Medications    venlafaxine (EFFEXOR-XR) 37 5 mg 24 hr capsule    fremanezumab-vfrm (Ajovy) 225 MG/1 5ML auto-injector    Rimegepant Sulfate (Nurtec) 75 MG TBDP    ketorolac (TORADOL) 60 mg/2 mL IM injection 60 mg (Completed)    prochlorperazine (COMPAZINE) injection 10 mg (Completed)    dexamethasone (DECADRON) 2 mg tablet       Other    Anxiety    Panic disorder without agoraphobia    Relevant Medications    venlafaxine (EFFEXOR-XR) 37 5 mg 24 hr capsule    prochlorperazine (COMPAZINE) injection 10 mg (Completed)              History of Present Illness: We had the pleasure of evaluating Casandra Minaya in neurological follow up  today for headaches  As you know,  she is a 37 y o   left handed female  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, was out for 6 months due to headaches  Patient is here today with her   She is here today for evaluation of her headaches         Medical history review:  QTc:  1/24/2020 - 392  Tobacco use: none  Gastric sleeve - 2016 -      Interval update 3/24/2021  Patient states that over the last month has gotten worse  Previously was 1-2 mild a week and then 1-3 a week of more severe  Patient has been getting daily migraines now  Patient has been having trouble at work, can't write her charts at work  Was gradually increasing since February  Now was over past 2 weeks  Patient is extremely worried and concerned about having to be in the hospital again and being out of work      Mood:   OCD - was controlled in the past but recently not doing well due to her medical problems  Depression: no  Anxiety: yes   Seeing a psychiatrist/ How often? Yes seeing a psychiatrist currently   Seeing at therapist/ how often? Yes, will be seeing them again     Headaches:   What medications do you take or have you taken for your headaches? Preventive therapy:   - vitamin-D, magnesium sulfate 2 g,  - Emgality -  - topiramate 100 mg twice a day , Depakote 500/1 g mg, gabapentin 100 mg,  - BuSpar 10 mg,  - Lexapro (became manic on this), venlafaxine 150 mg a day  - lorazepam 0 5 mg, Valium 5 mg,  - Skelaxin 800 mg, Flexeril 5 mg t i d ,  - Benadryl 50 mg,  Abortive Therapy:   - indomethacin 50 mg, Toradol 30 mg,  - Maxalt 10 mg, sumatriptan 50 mg,  - Fioricet,  - Compazine 10 mg, Zofran 4 mg, Reglan 10 mg,  - prednisone 20 mg, methylprednisolone,          What is your current pain level?  4/10     How often do the headaches occur? Mild headaches: 1-2 a week  Moderate to severe headaches:now daily; 1 a week to 2-3 before emglaity is due     Are you ever headache free? Yes      Aura/Warning and how long does it last?  - blurry vision for an hour and then headache gets worse - this occurs 2-3 times a week     What time of the day do the headaches start? Mild headaches: in am within 2 hours of waking up   Moderate to severe headaches: by 2-3 pm in the afternoon is moderate-severe     How long do the headaches last?   Mild headaches:few hours  Moderate to severe headaches: last rest of the day     Where is your headache located? Mild headaches: diffuse, facial, neck  Moderate to severe headaches: left frontal, temporal but sometime on the right side     Describe your usual headache? Mild headaches: pressure, dull, achy  Moderate to severe headaches: throbbing and pounding, stabbing on the left temple     What is the intensity of pain? Mild headaches: 3-4/10 and with exertion can get up to 5/10  Moderate to severe headaches: 6-8/10     Associated symptoms:   - Decreased appetite, Nausea  (increasing recently)     - Photophobia, Phonophobia, Osmophobia  - pale  - Stiff or sore neck   - Dizziness (mild too)   light headed  - Problems with concentration  - Blurred vision     - word finding difficulty (mild too), balance problem  - left hand tremor-brought on by anxiety headache  - Insomnia  - Prefer to be in a cool, quiet, dark room     Number of days missed per month because of headaches:  Work (or school) days: went home early 1 shift, usually doesn't call out (behind in her work)  Social or Family activities:  misses everything curretly     Headache are worse if the patient: cough, sneeze, bending over, exertion  Headache triggers:  overstimulation  What time of the year do headaches occur more frequently? none     Have you had trigger point injection performed and how often?  No  Have you had Botox injection performed and how often? No   Have you had epidural injections or transforaminal injections performed? No     Alternative therapies used in the past for headaches? physical therapy  Have you used CBD or THC for your headaches and how often? No  How many caffeine products to drink a day? 30  How much water to drink a day? 16 oz - 4 a day     Are you current pregnant or planning on getting pregnant? Done the family planning     Have you ever had any Brain imaging? Yes     03/03/2020-MRI cervical spine:  1   Mild spondylosis without cord compression or cord signal abnormality  2   Indeterminant left-sided thyroid nodule, requiring Further characterization with thyroid ultrasound    Incidental thyroid nodule(s) for which nonemergent thyroid ultrasound is recommended      02/14/2020-MRI of the brain with without contrast:  IMPRESSION:   No significant interval change since recent examination  No mass effect, acute intracranial hemorrhage or evidence of recent infarction   No abnormal parenchymal or leptomeningeal enhancement identified      03/03/2020-MRA of the brain:  IMPRESSION:   No intracranial aneurysm or major intracranial arterial stenosis      02/17/2020-EEG routine:  IMPRESSION:    This is a normal routine EEG   If a seizure disorder is considered clinically a repeat tracing with sleep deprivation may be of additional diagnostic value                                                           I personally reviewed these images         Past Medical History:   Diagnosis Date    Anxiety     Ear infection     Migraine     OCD (obsessive compulsive disorder)        Patient Active Problem List   Diagnosis    Sinus bradycardia    Leukocytosis    Postural dizziness with presyncope    Encounter to establish care    Anxiety    At risk for nutrition deficiency    Screening for lipid disorders    Tremor    Syncope    Hypovitaminosis D    Iron deficiency anemia    Speech abnormality    Chronic migraine without aura without status migrainosus, not intractable    Thyroid nodule    Obsessive-compulsive disorder with good or fair insight    Panic disorder without agoraphobia    High triglycerides    Health care maintenance    PTSD (post-traumatic stress disorder)       Medications:      Current Outpatient Medications   Medication Sig Dispense Refill    busPIRone (BUSPAR) 15 mg tablet TAKE 1 TABLET (15 MG TOTAL) BY MOUTH 3 (THREE) TIMES A DAY AS NEEDED (ANXIETY) (Patient taking differently: Take 30 mg by mouth 2 (two) times a day ) 90 tablet 1    divalproex sodium (DEPAKOTE) 500 mg EC tablet One at night time as needed 30 tablet 3    Galcanezumab-gnlm 120 MG/ML SOAJ Inject 120 mg under the skin every 30 (thirty) days 1 pen 11    LORazepam (ATIVAN) 0 5 mg tablet Take 1 tablet (0 5 mg total) by mouth daily as needed for anxiety 30 tablet 0    metaxalone (SKELAXIN) 800 mg tablet Take 1 tablet (800 mg total) by mouth 3 (three) times a day (Patient taking differently: Take 800 mg by mouth 3 (three) times a day as needed ) 90 tablet 0    metoclopramide (REGLAN) 10 mg tablet 1 at the onset of a migraine headache or nausea t i d  p r n  10 tablet 1    topiramate (TOPAMAX) 100 mg tablet 1 tabs in a m  and 1 at bedtime 180 tablet 3    venlafaxine (EFFEXOR-XR) 150 mg 24 hr capsule TAKE 1 CAPSULE BY MOUTH EVERY DAY 30 capsule 0    venlafaxine (EFFEXOR-XR) 37 5 mg 24 hr capsule Take 37 5 mg by mouth daily      BIOTIN PO Take 1 tablet by mouth daily      dexamethasone (DECADRON) 2 mg tablet Take 1 tablet (2 mg total) by mouth daily with breakfast 5 tablet 0    fremanezumab-vfrm (Ajovy) 225 MG/1 5ML auto-injector Inject 4 5 mL (675 mg total) under the skin every 3 (three) months 3 pen 4    Rimegepant Sulfate (Nurtec) 75 MG TBDP Take 75 mg by mouth as needed (migraine) 8 tablet 3     No current facility-administered medications for this visit  Allergies:       Allergies   Allergen Reactions    Levaquin [Levofloxacin] Hallucinations    Lexapro [Escitalopram]        Family History:     Family History   Problem Relation Age of Onset    Arthritis Family     Lung cancer Family     Diabetes Mother     Lung cancer Mother     Meniere's disease Mother     No Known Problems Father        Social History:     Social History     Socioeconomic History    Marital status: /Civil Union     Spouse name: Not on file    Number of children: Not on file    Years of education: Not on file    Highest education level: Not on file   Occupational History    Not on file   Social Needs    Financial resource strain: Not on file    Food insecurity     Worry: Not on file     Inability: Not on file   [employer] needs     Medical: Not on file     Non-medical: Not on file   Tobacco Use    Smoking status: Never Smoker    Smokeless tobacco: Never Used   Substance and Sexual Activity    Alcohol use: Yes     Comment: Occasionally     Drug use: Never    Sexual activity: Not on file   Lifestyle    Physical activity     Days per week: Not on file     Minutes per session: Not on file    Stress: Not on file   Relationships    Social connections     Talks on phone: Not on file     Gets together: Not on file     Attends Zoroastrianism service: Not on file     Active member of club or organization: Not on file     Attends meetings of clubs or organizations: Not on file     Relationship status: Not on file    Intimate partner violence     Fear of current or ex partner: Not on file     Emotionally abused: Not on file     Physically abused: Not on file     Forced sexual activity: Not on file   Other Topics Concern    Not on file   Social History Narrative    Daily Coffee    Daily Tea      I have reviewed the patient's medical, social and surgical history as well as medications in detail and updated the computerized patient record        Objective:   Physical Exam:                                                                   Vitals: /93 (BP Location: Left arm, Patient Position: Sitting, Cuff Size: Standard)   Pulse 81   Temp 98 1 °F (36 7 °C) (Temporal)   Ht 5' 3" (1 6 m)   Wt 88 5 kg (195 lb)   BMI 34 54 kg/m²   BP Readings from Last 3 Encounters:   03/24/21 136/93   09/22/20 126/88   06/26/20 129/61     Pulse Readings from Last 3 Encounters:   03/24/21 81   09/22/20 66   06/26/20 83          CONSTITUTIONAL: Well developed, well nourished, well groomed  No dysmorphic features  Eyes:  EOM normal      Neck:  Normal ROM, neck supple  HEENT:  Normocephalic atraumatic  Chest:  Respirations regular and unlabored  Psychiatric:  Anxious and tearful in office      MENTAL STATUS  Orientation: Alert and oriented x 3  Fund of knowledge: Intact  MOTOR (Upper and lower extremities)   Bulk/tone/abnormal movement: Normal muscle bulk and tone  COORDINATION   Station/Gait: Normal baseline gait  Review of Systems:   Review of Systems  Constitutional: Positive for fatigue  HENT: Positive for tinnitus  Eyes: Positive for pain  Respiratory: Negative  Cardiovascular: Negative  Gastrointestinal: Positive for nausea  Endocrine: Negative  Genitourinary: Negative  Musculoskeletal: Positive for neck pain and neck stiffness  Skin: Negative  Allergic/Immunologic: Negative  Neurological: Positive for dizziness and headaches  Hematological: Negative  Psychiatric/Behavioral: Negative      I personally reviewed the ROS entered by the MA    I spent 55 minutes in face-to-face discussion regarding  the pathophysiology of her current symptoms and further plan, as well as counseling, educating, and coordinating the patient's care including prognosis of diagnosis, diagnostic results, impression, and recommendations, risks and benefits of treatment, instructions for disease self management, treatment instructions, follow up requirements, risk factors and risk reduction of disease, patient and family counseling/involvement in care and compliance with treatment regimen and spent 15 minutes non-face to face    Author:  Ej Ramirez PA-C 3/24/2021 3:13 PM

## 2021-03-24 NOTE — ASSESSMENT & PLAN NOTE
Preventive therapy for headaches:   Reproductive age women: Should take folic acid daily when taking anti-seizure drugs especially Depakote   -Over-the-counter supplements: to decrease intensity and frequency of migraines  - Magnesium Oxide 400mg a day  If any diarrhea or upset stomach, decrease dose  as tolerated  (oral magnesium oxide may be an effective preventive strategy for people with migraine  Some theories about how it works include the idea that magnesium can help to prevent waves of cortical spreading depression and aura  Magnesium, in theory, also reduces the release of inflammatory or activating chemicals that can cause migraine)  - Vitamin B2 200 mg twice a day  May cause the urine to turn yellow which is normal for B 2 to do and is not a sign that you are dehydrated  (may be an effective preventive medication in some people with migraine)  - Vitamin E which may help with menstrual migraine  There is limited data on this, but it may reduce nausea, photophobia and phonophobia during menstruation  - continue topiramate 100 mg twice a day  - Ajovy 3 injections every 3 months  - continue venlafaxine 150 mg once a day  Abortive therapy for headaches:   - At onset of Nurtec 75 mg  Limit 1 in 24 hours  Depakote 500 mg at bedtime for 7 nights  If no improvement Decadron 2 mg for 5 days in the am  during the day patient may take Reglan with Skelaxin- if needed she is taking ativan 0 5mg half a tab and that seems to help as well

## 2021-03-24 NOTE — TELEPHONE ENCOUNTER
Pulled information on CMM for medication Nurtec 75 Mg tablets needs PA  I started PA for chronic migraine  KEY: [id number]    Awaiting determination from [facility] 2017 [employer]

## 2021-03-24 NOTE — PATIENT INSTRUCTIONS
Preventive therapy for headaches:   Reproductive age women: Should take folic acid daily when taking anti-seizure drugs especially Depakote   -Over-the-counter supplements: to decrease intensity and frequency of migraines  - Magnesium Oxide 400mg a day  If any diarrhea or upset stomach, decrease dose  as tolerated  (oral magnesium oxide may be an effective preventive strategy for people with migraine  Some theories about how it works include the idea that magnesium can help to prevent waves of cortical spreading depression and aura  Magnesium, in theory, also reduces the release of inflammatory or activating chemicals that can cause migraine)  - Vitamin B2 200 mg twice a day  May cause the urine to turn yellow which is normal for B 2 to do and is not a sign that you are dehydrated  (may be an effective preventive medication in some people with migraine)  - Vitamin E which may help with menstrual migraine  There is limited data on this, but it may reduce nausea, photophobia and phonophobia during menstruation  - continue topiramate 100 mg twice a day  - Ajovy 3 injections every 3 months  - continue venlafaxine 150 mg once a day  Abortive therapy for headaches:   - At onset of Nurtec 75 mg  Limit 1 in 24 hours  Depakote 500 mg at bedtime for 7 nights  If no improvement Decadron 2 mg for 5 days in the am  during the day patient may take Reglan with Skelaxin- if needed she is taking ativan 0 5mg half a tab and that seems to help as well  Headache management instructions  - When patient has a moderate to severe headache, they should seek rest, initiate relaxation and apply cold compresses to the head  - Maintain regular sleep schedule  Adults need at least 7-8 hours of uninterrupted a night  - Limit over the counter medications such as Tylenol, Ibuprofen, Aleve, Excedrin  (No more than 2- 3 times a week or max 10 a month)  - Maintain headache diary    Free CJ for a smart phone, which can be used is "Migraine fredy"  - Limit caffeine to 1-2 cups 8 to 16 oz a day or less  - Avoid dietary trigger  (aged cheese, peanuts, MSG, aspartame and nitrates)  - Patient is to have regular frequent meals to prevent headache onset  - Please drink at least 64 ounces of water a day to help remain hydrated  Importance of Healthy Sleep:  Behavioral sleep changes can promote restful, regular sleep and reduce headache  Simple changes like establishing consistent sleep and wake-up times, as well as getting between 7 and 8 hours of sleep a day, can make a world of difference  Experts also recommend avoiding substances that impair sleep, like caffeine, nicotine and alcohol, and also suggest winding down before bed to prevent sleep problems  To read more go to https://[employer]an[employer]itiveund[employer]  org/resource-library/sleep/    Exercising for migraineurs:  Regular exercise can reduce the frequency and intensity of headaches and migraines  When one exercises, the body releases endorphins, which are the bodys natural painkillers  Exercise reduces stress and helps individuals to sleep at night  Exercising at least 30 to 40 minutes 3 times a week is sufficient for most patients  When exercising, follow this plan to prevent headaches:  - First, stay hydrated before, during, and after exercise  - Second part of the exercise plan is to eat sufficient food about an hour and a half before you exercise  Exercise causes ones blood sugar level to decrease, and it is important to have a source of energy    - Final part of the exercise plan is to warm-up  Do not jump into sudden, vigorous exercise if that triggers a headache or migraine  To read more go to https://americanSocitiveundation  org/resource-library/effects-of-exercise-on-headaches-and-migraines/     Please call with any questions or concerns   Office number is [phone]

## 2021-03-24 NOTE — TELEPHONE ENCOUNTER
Per [facility] approved-Request Reference Number: [id number]   NURTEC TAB 75MG ODT is approved through 06/24/2021    Called pharm for insurance info  [facility]-[id number]  pcn-IRX  rxgroup-[id number]  ZN-[id number]  Btsps-[phone]  Ajovy 3 pens every 90 days PA completed on [facility]

## 2021-03-24 NOTE — PROGRESS NOTES
Review of Systems   Constitutional: Positive for fatigue  HENT: Positive for tinnitus  Eyes: Positive for pain  Respiratory: Negative  Cardiovascular: Negative  Gastrointestinal: Positive for nausea  Endocrine: Negative  Genitourinary: Negative  Musculoskeletal: Positive for neck pain and neck stiffness  Skin: Negative  Allergic/Immunologic: Negative  Neurological: Positive for dizziness and headaches  Hematological: Negative  Psychiatric/Behavioral: Negative

## 2021-03-25 NOTE — TELEPHONE ENCOUNTER
Lidia approved per [facility]-  Request Reference Number: [id number]  Katie Brownion 225/1 5 is approved through 03/24/2022      Left message for pharm making them aware of approval for ajovy and nurtec

## 2021-03-30 NOTE — TELEPHONE ENCOUNTER
She is right, it should not be on there still  Let her know I will officially take it off  South sub transfer. See call in note

## 2021-04-15 DIAGNOSIS — G43.709 CHRONIC MIGRAINE WITHOUT AURA WITHOUT STATUS MIGRAINOSUS, NOT INTRACTABLE: ICD-10-CM

## 2021-04-15 RX ORDER — DIVALPROEX SODIUM 500 MG/1
TABLET, DELAYED RELEASE ORAL
Qty: 30 TABLET | Refills: 3 | Status: SHIPPED | OUTPATIENT
Start: 2021-04-15

## 2021-05-27 ENCOUNTER — TELEPHONE (OUTPATIENT)
Dept: NEUROLOGY | Facility: CLINIC | Age: 41
End: 2021-05-27

## 2021-06-09 NOTE — TELEPHONE ENCOUNTER
Received call from chris saavedra, asking if we will do PA to renew [facility]  ref [email or web address], call back [phone]    I returned call and advised we will be completing PA

## 2021-06-10 ENCOUNTER — TELEPHONE (OUTPATIENT)
Dept: NEUROLOGY | Facility: CLINIC | Age: 41
End: 2021-06-10

## 2021-06-14 NOTE — TELEPHONE ENCOUNTER
denied as med can not be used in combo with another similar medicine (an oral CGRP)  this question was accidently answered incorrectly on PA       called [facility] at [phone] and spoke to St laws  made her aware that pt will not be using nurtec with another oral CGRP  another PA completed over the phone and approved  approved through Aug 2022    approval letter to be faxed to the office

## 2021-07-19 ENCOUNTER — TELEPHONE (OUTPATIENT)
Dept: NEUROLOGY | Facility: CLINIC | Age: 41
End: 2021-07-19

## 2021-07-19 NOTE — TELEPHONE ENCOUNTER
Is this new for her? She can come to office for toradol and compazine if this will help    Should see her PCP as well as may be inner ear issue

## 2021-07-19 NOTE — TELEPHONE ENCOUNTER
Pt made aware of below  Seen ENT in the past and they don't feel that it is inner ear issue and recommended neurology  She states that she lives too far away to come for injections  Made her aware that she can try nurtec to see if this helps      She will try nutrec and will call back with any issues

## 2021-07-19 NOTE — TELEPHONE ENCOUNTER
pt called and states that she has been very dizzy for 6 days straight,  hard time functioning  dizziness is constant  states that speech is wonky, no headache currently  gait is off    took ativan x2 over the last 6 days and not effective      had bouts of dizziness in the past     she does have nurtec as abortive but did not take as she does not have a headache   states that in the past she received migraine cocktail in hospital and dc'd with a low dose of valium for a week after for dizziness and this was effective  please advise  [phone] to leave detailed message

## 2021-07-28 ENCOUNTER — TELEPHONE (OUTPATIENT)
Dept: NEUROLOGY | Facility: CLINIC | Age: 41
End: 2021-07-28

## 2021-07-28 NOTE — TELEPHONE ENCOUNTER
Patient showed up 40 min late for appt that was to start at  815  Provider unable to see patient due to full schedule  Appt was cancelled, did not reschedule as patient has a 1yr f/u scheduled for 9/20/21 with same provider

## 2021-09-07 ENCOUNTER — TELEPHONE (OUTPATIENT)
Dept: NEUROLOGY | Facility: CLINIC | Age: 41
End: 2021-09-07

## 2021-09-07 NOTE — TELEPHONE ENCOUNTER
Called patient to make her aware of the change in her appointment time for 09/20/21 from 11:30am to 11:15am  Patient requested that appointment be switched over to a virtual

## 2021-09-20 ENCOUNTER — TELEMEDICINE (OUTPATIENT)
Dept: NEUROLOGY | Facility: CLINIC | Age: 41
End: 2021-09-20
Payer: COMMERCIAL

## 2021-09-20 DIAGNOSIS — F43.10 PTSD (POST-TRAUMATIC STRESS DISORDER): Primary | ICD-10-CM

## 2021-09-20 DIAGNOSIS — F42.9 OBSESSIVE-COMPULSIVE DISORDER WITH GOOD OR FAIR INSIGHT: ICD-10-CM

## 2021-09-20 DIAGNOSIS — G43.709 CHRONIC MIGRAINE WITHOUT AURA WITHOUT STATUS MIGRAINOSUS, NOT INTRACTABLE: ICD-10-CM

## 2021-09-20 PROCEDURE — 99214 OFFICE O/P EST MOD 30 MIN: CPT | Performed by: PHYSICIAN ASSISTANT

## 2021-09-20 RX ORDER — TOPIRAMATE 100 MG/1
100 TABLET, FILM COATED ORAL 2 TIMES DAILY
Qty: 180 TABLET | Refills: 3 | Status: SHIPPED | OUTPATIENT
Start: 2021-09-20 | End: 2022-07-05

## 2021-09-20 RX ORDER — KETOROLAC TROMETHAMINE 30 MG/ML
60 INJECTION, SOLUTION INTRAMUSCULAR; INTRAVENOUS EVERY 6 HOURS PRN
Qty: 5 ML | Refills: 3 | Status: SHIPPED | OUTPATIENT
Start: 2021-09-20

## 2021-09-20 RX ORDER — DEXAMETHASONE 1 MG
1 TABLET ORAL
Qty: 10 TABLET | Refills: 2 | Status: SHIPPED | OUTPATIENT
Start: 2021-09-20 | End: 2022-07-05 | Stop reason: SDUPTHER

## 2021-09-20 RX ORDER — KETOROLAC TROMETHAMINE 10 MG/1
10 TABLET, FILM COATED ORAL EVERY 6 HOURS PRN
Qty: 10 TABLET | Refills: 0 | Status: SHIPPED | OUTPATIENT
Start: 2021-09-20 | End: 2022-06-13 | Stop reason: SDUPTHER

## 2021-09-20 NOTE — PROGRESS NOTES
Virtual Regular Visit    Verification of patient location:    Patient is located in the following state in which I hold an active license [place]      Assessment/Plan:    Problem List Items Addressed This Visit        Cardiovascular and Mediastinum    Chronic migraine without aura without status migrainosus, not intractable     Preventive therapy for headaches:   Reproductive age women: Should take folic acid daily when taking anti-seizure drugs especially Depakote   -Over-the-counter supplements: to decrease intensity and frequency of migraines  - Magnesium Oxide 400mg a day  If any diarrhea or upset stomach, decrease dose  as tolerated  (oral magnesium oxide may be an effective preventive strategy for people with migraine  Some theories about how it works include the idea that magnesium can help to prevent waves of cortical spreading depression and aura  Magnesium, in theory, also reduces the release of inflammatory or activating chemicals that can cause migraine)  - Vitamin B2 200 mg twice a day  May cause the urine to turn yellow which is normal for B 2 to do and is not a sign that you are dehydrated  (may be an effective preventive medication in some people with migraine)  - Vitamin E which may help with menstrual migraine  There is limited data on this, but it may reduce nausea, photophobia and phonophobia during menstruation  - continue topiramate 100 mg twice a day  - Ajovy 3 injections every 3 months  Abortive therapy for headaches:   - At onset of Nurtec 75 mg  Limit 1 in 24 hours  - In addition take Toradol 10 mg    May repeat in 8 hours if needed Or can choose a toradol injection of 60 mg     - If still present, take Decadron 1 mg  - On nights of your migraines, take Depakote at bedtime (if not working)         Relevant Medications    ketorolac (TORADOL) 10 mg tablet    dexamethasone (DECADRON) 1 mg tablet    topiramate (TOPAMAX) 100 mg tablet    ketorolac (TORADOL) 60 mg/2 mL    Syringe/Needle, Disp, (SYRINGE 3CC/95SV1-3/2") 25G X 1-1/2" 3 ML MISC       Other    Obsessive-compulsive disorder with good or fair insight    PTSD (post-traumatic stress disorder) - Primary               Reason for visit is   Chief Complaint   Patient presents with    Migraine    Virtual Regular Visit        Encounter provider Alex Thomas PA-C    Provider located at [address]  [phone]      Recent Visits  No visits were found meeting these conditions  Showing recent visits within past 7 days and meeting all other requirements  Today's Visits  Date Type Provider Dept   09/20/21 Telemedicine Alex Thomas PA-C  [facility]   Showing today's visits and meeting all other requirements  Future Appointments  No visits were found meeting these conditions  Showing future appointments within next 150 days and meeting all other requirements       The patient was identified by name and date of birth  Miguel Angel Moncada was informed that this is a telemedicine visit and that the visit is being conducted through [address] [place] [address] [facility] and patient was informed that this is a secure, HIPAA-compliant platform  She agrees to proceed     My office door was closed  No one else was in the room  She acknowledged consent and understanding of privacy and security of the video platform  The patient has agreed to participate and understands they can discontinue the visit at any time  Patient is aware this is a billable service  Subjective  Miguel Angel Moncada is a 37 y  o left handed female 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, was out for 6 months due to headaches  Lucrecia Phelanist is here today with her   Laurence Blocker is here today for evaluation of her headaches      Medical history review:  QTc:  1/24/2020 - 392  Tobacco use: none  Gastric sleeve - 2016 -      Interval update 9/20/2021  Patient stopped her Buspar and Venlafaxine approx  3 months ago  Feels like she is better since then  Mood is really good  However, her OCD is less controlled  Anxiety is good    Interval update 3/24/2021  Patient states that over the last month has gotten worse  Previously was 1-2 mild a week and then 1-3 a week of more severe  Patient has been getting daily migraines now  Patient has been having trouble at work, can't write her charts at work  Was gradually increasing since February  Now was over past 2 weeks  Patient is extremely worried and concerned about having to be in the hospital again and being out of work      Mood:   OCD - was controlled in the past but recently not doing well due to her medical problems  Depression: no  Anxiety: yes   Seeing a psychiatrist/ How often? Yes seeing a psychiatrist currently   Seeing at therapist/ how often? Yes, will be seeing them again     Headaches:   What medications do you take or have you taken for your headaches? Current Preventative  Ajovy  Topamax    Current Abortive:  Nurtec  Reglan  Depakote    Prior Preventive therapy:   - vitamin-D, magnesium sulfate 2 g,  - Emgality -  - Depakote 500/1 g mg, gabapentin 100 mg,  - BuSpar 10 mg,  - Lexapro (became manic on this), venlafaxine 150 mg a day  - lorazepam 0 5 mg, Valium 5 mg,  - Skelaxin 800 mg, Flexeril 5 mg t i d ,  - Benadryl 50 mg,  Prior Abortive Therapy:   - indomethacin 50 mg, Toradol 30 mg,  - Maxalt 10 mg, sumatriptan 50 mg,  - Fioricet,  - Compazine 10 mg, Zofran 4 mg, Reglan 10 mg,  - prednisone 20 mg, methylprednisolone,          What is your current pain level? 0/10     How often do the headaches occur? Mild headaches: 2-4 a week  Moderate to severe headaches:1 a week was daily     Are you ever headache free? Yes      Aura/Warning and how long does it last?  - blurry vision for an hour and then headache gets worse - this occurs 2-3 times a week     What time of the day do the headaches start?    Mild headaches: in am within 2 hours of waking up , can also get them later in the evening  Moderate to severe headaches: in the am     How long do the headaches last?   Mild headaches: few hours  Moderate to severe headaches: last rest of the day to 3 days     Where is your headache located? Mild headaches: diffuse, facial, neck  Moderate to severe headaches: left frontal, temporal but sometime on the right side     Describe your usual headache? Mild headaches: pressure, dull, achy  Moderate to severe headaches: throbbing and pounding, stabbing on the left temple     What is the intensity of pain? Mild headaches: 3-4/10 and with exertion can get up to 5/10  Moderate to severe headaches: 7-8/10     Associated symptoms:   - Decreased appetite, Nausea  (increasing recently)     - Photophobia, Phonophobia, Osmophobia  - pale  - Stiff or sore neck   - Dizziness (mild too)   light headed  - Problems with concentration  - Blurred vision     - word finding difficulty (mild too), balance problem  - left hand tremor-brought on by anxiety headache  - Insomnia  - Prefer to be in a cool, quiet, dark room     Number of days missed per month because of headaches:  Work (or school) days: hasn't missed any in the past 2 month  Social or Family activities:  quite a few     Headache are worse if the patient: cough, sneeze, bending over, exertion  Headache triggers:  overstimulation  What time of the year do headaches occur more frequently? none     Have you had trigger point injection performed and how often? No  Have you had Botox injection performed and how often? No   Have you had epidural injections or transforaminal injections performed? No     Alternative therapies used in the past for headaches?  physical therapy  Have you used CBD or THC for your headaches and how often? No  How many caffeine products to drink a day?  30  How much water to drink a day? 16 oz - 4 a day     Are you current pregnant or planning on getting pregnant?  Done the family planning     Have you ever had any Brain imaging? Yes     2020-MRI cervical spine:  1   Mild spondylosis without cord compression or cord signal abnormality  2   Indeterminant left-sided thyroid nodule, requiring Further characterization with thyroid ultrasound    Incidental thyroid nodule(s) for which nonemergent thyroid ultrasound is recommended      2020-MRI of the brain with without contrast:  IMPRESSION:   No significant interval change since recent examination  No mass effect, acute intracranial hemorrhage or evidence of recent infarction   No abnormal parenchymal or leptomeningeal enhancement identified      2020-MRA of the brain:  IMPRESSION:   No intracranial aneurysm or major intracranial arterial stenosis      2020-EEG routine:  IMPRESSION:    This is a normal routine EEG   If a seizure disorder is considered clinically a repeat tracing with sleep deprivation may be of additional diagnostic value                                                           I personally reviewed these images           Past Medical History:   Diagnosis Date    Anxiety     Ear infection     Migraine     OCD (obsessive compulsive disorder)        Past Surgical History:   Procedure Laterality Date     SECTION      CHOLECYSTECTOMY      EAR SURGERY      STOMACH SURGERY         Current Outpatient Medications   Medication Sig Dispense Refill    Ajovy 225 MG/1 5ML auto-injector INJECT 4 5 ML UNDER THE SKIN EVERY 3 MONTHS 4 5 pen 4    divalproex sodium (DEPAKOTE) 500 mg EC tablet TAKE 1 TABLET BY MOUTH EVERY DAY AT NIGHT AS NEEDED 30 tablet 3    LORazepam (ATIVAN) 0 5 mg tablet Take 1 tablet (0 5 mg total) by mouth daily as needed for anxiety 30 tablet 0    metaxalone (SKELAXIN) 800 mg tablet Take 1 tablet (800 mg total) by mouth 3 (three) times a day (Patient taking differently: Take 800 mg by mouth 3 (three) times a day as needed ) 90 tablet 0    metoclopramide (REGLAN) 10 mg tablet 1 at the onset of a migraine headache or nausea t i d  p r n  10 tablet 1    Rimegepant Sulfate (Nurtec) 75 MG TBDP Take 75 mg by mouth as needed (migraine) 8 tablet 3    topiramate (TOPAMAX) 100 mg tablet Take 1 tablet (100 mg total) by mouth 2 (two) times a day 180 tablet 3    busPIRone (BUSPAR) 15 mg tablet TAKE 1 TABLET (15 MG TOTAL) BY MOUTH 3 (THREE) TIMES A DAY AS NEEDED (ANXIETY) (Patient not taking: Reported on 9/20/2021) 90 tablet 1    dexamethasone (DECADRON) 1 mg tablet Take 1 tablet (1 mg total) by mouth daily with breakfast 10 tablet 2    dexamethasone (DECADRON) 2 mg tablet Take 1 tablet (2 mg total) by mouth daily with breakfast (Patient not taking: Reported on 9/20/2021) 5 tablet 0    Galcanezumab-gnlm 120 MG/ML SOAJ Inject 120 mg under the skin every 30 (thirty) days (Patient not taking: Reported on 9/20/2021) 1 pen 11    ketorolac (TORADOL) 10 mg tablet Take 1 tablet (10 mg total) by mouth every 6 (six) hours as needed (migraine) 10 tablet 0    ketorolac (TORADOL) 60 mg/2 mL Inject 2 mL (60 mg total) into a muscle every 6 (six) hours as needed for moderate pain 5 mL 3    Syringe/Needle, Disp, (SYRINGE 3CC/06VE1-3/2") 25G X 1-1/2" 3 ML MISC Use as needed (for toradol IM) 10 each 3    venlafaxine (EFFEXOR-XR) 150 mg 24 hr capsule TAKE 1 CAPSULE BY MOUTH EVERY DAY (Patient not taking: Reported on 9/20/2021) 30 capsule 0    venlafaxine (EFFEXOR-XR) 37 5 mg 24 hr capsule Take 37 5 mg by mouth daily (Patient not taking: Reported on 9/20/2021)       No current facility-administered medications for this visit  Allergies   Allergen Reactions    Levaquin [Levofloxacin] Hallucinations    Lexapro [Escitalopram]     I have reviewed the patient's medical, social and surgical history as well as medications in detail and updated the computerized patient record  Review of Systems   Constitutional: Negative  HENT: Negative  Eyes: Negative  Respiratory: Negative      Cardiovascular: Negative  Gastrointestinal: Negative  Endocrine: Negative  Genitourinary: Negative  Musculoskeletal: Negative  Skin: Negative  Allergic/Immunologic: Negative  Neurological: Positive for headaches  Hematological: Negative  Psychiatric/Behavioral: Negative  I personally reviewed and updated the ROS that was entered by the medical assistant      Video Exam    There were no vitals filed for this visit  Physical Exam   CONSTITUTIONAL: Well developed, well nourished, well groomed  No dysmorphic features  Eyes:  EOM normal      Neck:  Normal ROM, neck supple  HEENT:  Normocephalic atraumatic  Chest:  Respirations regular and unlabored  Psychiatric:  Normal behavior and appropriate affect      MENTAL STATUS  Orientation: Alert and oriented x 3  Fund of knowledge: Intact  MOTOR (Upper and lower extremities)   Bulk/tone/abnormal movement: Normal muscle bulk and tone  I spent 19 minutes with patient today in which greater than 50% of the time was spent in counseling/coordination of care regarding as above and 15 minutes of non-face to face time    Kal verbally agrees to participate in [employer]  Pt is aware that [employer] could be limited without vital signs or the ability to perform a full hands-on physical exam  Antonia Diggs understands she or the provider may request at any time to terminate the video visit and request the patient to seek care or treatment in person

## 2021-09-20 NOTE — PATIENT INSTRUCTIONS
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, patient with recent stroke and reported sleep apnea  Will monitor daily CMP       Preventive therapy for headaches:   Reproductive age women: Should take folic acid daily when taking anti-seizure drugs especially Depakote   -Over-the-counter supplements: to decrease intensity and frequency of migraines  - Magnesium Oxide 400mg a day  If any diarrhea or upset stomach, decrease dose  as tolerated  (oral magnesium oxide may be an effective preventive strategy for people with migraine  Some theories about how it works include the idea that magnesium can help to prevent waves of cortical spreading depression and aura  Magnesium, in theory, also reduces the release of inflammatory or activating chemicals that can cause migraine)  - Vitamin B2 200 mg twice a day  May cause the urine to turn yellow which is normal for B 2 to do and is not a sign that you are dehydrated  (may be an effective preventive medication in some people with migraine)  - Vitamin E which may help with menstrual migraine  There is limited data on this, but it may reduce nausea, photophobia and phonophobia during menstruation  - continue topiramate 100 mg twice a day  - Ajovy 3 injections every 3 months  Abortive therapy for headaches:   - At onset of Nurtec 75 mg  Limit 1 in 24 hours  - In addition take Toradol 10 mg  May repeat in 8 hours if needed Or can choose a toradol injection of 60 mg     - If still present, take Decadron 1 mg  - On nights of your migraines, take Depakote at bedtime (if not working)       Headache management instructions  - When patient has a moderate to severe headache, they should seek rest, initiate relaxation and apply cold compresses to the head  - Maintain regular sleep schedule  Adults need at least 7-8 hours of uninterrupted a night  - Limit over the counter medications such as Tylenol, Ibuprofen, Aleve, Excedrin  (No more than 2- 3 times a week or max 10 a month)  - Maintain headache diary    Free CJ for a smart phone, which can be used is "Migraine fredy"  - Limit caffeine to 1-2 cups 8 to 16 oz a day or less  - Avoid dietary trigger  (aged cheese, peanuts, MSG, aspartame and nitrates)  - Patient is to have regular frequent meals to prevent headache onset  - Please drink at least 64 ounces of water a day to help remain hydrated  Importance of Healthy Sleep:  Behavioral sleep changes can promote restful, regular sleep and reduce headache  Simple changes like establishing consistent sleep and wake-up times, as well as getting between 7 and 8 hours of sleep a day, can make a world of difference  Experts also recommend avoiding substances that impair sleep, like caffeine, nicotine and alcohol, and also suggest winding down before bed to prevent sleep problems  To read more go to https://[employer]  org/resource-library/sleep/    Exercising for migraineurs:  Regular exercise can reduce the frequency and intensity of headaches and migraines  When one exercises, the body releases endorphins, which are the bodys natural painkillers  Exercise reduces stress and helps individuals to sleep at night  Exercising at least 30 to 40 minutes 3 times a week is sufficient for most patients  When exercising, follow this plan to prevent headaches:  - First, stay hydrated before, during, and after exercise  - Second part of the exercise plan is to eat sufficient food about an hour and a half before you exercise  Exercise causes ones blood sugar level to decrease, and it is important to have a source of energy    - Final part of the exercise plan is to warm-up  Do not jump into sudden, vigorous exercise if that triggers a headache or migraine  To read more go to https://[employer]  org/resource-library/effects-of-exercise-on-headaches-and-migraines/     Please call with any questions or concerns   Office number is [phone]

## 2021-09-20 NOTE — ASSESSMENT & PLAN NOTE
Preventive therapy for headaches:   Reproductive age women: Should take folic acid daily when taking anti-seizure drugs especially Depakote   -Over-the-counter supplements: to decrease intensity and frequency of migraines  - Magnesium Oxide 400mg a day  If any diarrhea or upset stomach, decrease dose  as tolerated  (oral magnesium oxide may be an effective preventive strategy for people with migraine  Some theories about how it works include the idea that magnesium can help to prevent waves of cortical spreading depression and aura  Magnesium, in theory, also reduces the release of inflammatory or activating chemicals that can cause migraine)  - Vitamin B2 200 mg twice a day  May cause the urine to turn yellow which is normal for B 2 to do and is not a sign that you are dehydrated  (may be an effective preventive medication in some people with migraine)  - Vitamin E which may help with menstrual migraine  There is limited data on this, but it may reduce nausea, photophobia and phonophobia during menstruation  - continue topiramate 100 mg twice a day  - Ajovy 3 injections every 3 months  Abortive therapy for headaches:   - At onset of Nurtec 75 mg  Limit 1 in 24 hours  - In addition take Toradol 10 mg    May repeat in 8 hours if needed Or can choose a toradol injection of 60 mg     - If still present, take Decadron 1 mg  - On nights of your migraines, take Depakote at bedtime (if not working)

## 2021-10-06 DIAGNOSIS — G43.709 CHRONIC MIGRAINE WITHOUT AURA WITHOUT STATUS MIGRAINOSUS, NOT INTRACTABLE: ICD-10-CM

## 2021-10-07 RX ORDER — KETOROLAC TROMETHAMINE 30 MG/ML
INJECTION, SOLUTION INTRAMUSCULAR; INTRAVENOUS ONCE
Qty: 1 ML | OUTPATIENT
Start: 2021-10-07 | End: 2021-10-07

## 2021-10-07 RX ORDER — KETOROLAC TROMETHAMINE 30 MG/ML
60 INJECTION, SOLUTION INTRAMUSCULAR; INTRAVENOUS AS NEEDED
Qty: 10 ML | Refills: 5 | Status: SHIPPED | OUTPATIENT
Start: 2021-10-07

## 2021-12-15 ENCOUNTER — VBI (OUTPATIENT)
Dept: ADMINISTRATIVE | Facility: OTHER | Age: 41
End: 2021-12-15

## 2022-03-08 ENCOUNTER — TELEPHONE (OUTPATIENT)
Dept: NEUROLOGY | Facility: CLINIC | Age: 42
End: 2022-03-08

## 2022-03-08 NOTE — TELEPHONE ENCOUNTER
Received fax from [employer]  Ajovy PA is about to       Chart reviewed: PA will  on 3/24/2022    Will initiate next week

## 2022-03-14 DIAGNOSIS — G43.709 CHRONIC MIGRAINE WITHOUT AURA WITHOUT STATUS MIGRAINOSUS, NOT INTRACTABLE: ICD-10-CM

## 2022-03-14 RX ORDER — RIMEGEPANT SULFATE 75 MG/75MG
TABLET, ORALLY DISINTEGRATING ORAL
Qty: 8 TABLET | Refills: 3 | Status: SHIPPED | OUTPATIENT
Start: 2022-03-14

## 2022-03-17 NOTE — TELEPHONE ENCOUNTER
Per [facility]-Request Reference Number: [id number]  Karey Jeremy 225/1 5 is approved through 03/15/2023       Approval letter placed in clerical bin to be scanned

## 2022-04-01 NOTE — TELEPHONE ENCOUNTER
Per [facility]-Lidia CROWE has been approved but received fax from [employer] that Ajovy PA has been denied  Approval letter printed and placed in clerical bin again  Called PA dept at [phone], spoke w/Victor Hugo and advised of the above  States that PA has been approved

## 2022-06-13 ENCOUNTER — PATIENT MESSAGE (OUTPATIENT)
Dept: NEUROLOGY | Facility: CLINIC | Age: 42
End: 2022-06-13

## 2022-06-13 DIAGNOSIS — G43.709 CHRONIC MIGRAINE WITHOUT AURA WITHOUT STATUS MIGRAINOSUS, NOT INTRACTABLE: ICD-10-CM

## 2022-06-13 RX ORDER — RIMEGEPANT SULFATE 75 MG/75MG
TABLET, ORALLY DISINTEGRATING ORAL
Qty: 8 TABLET | Refills: 0 | Status: CANCELLED | OUTPATIENT
Start: 2022-06-13

## 2022-06-14 RX ORDER — METOCLOPRAMIDE 10 MG/1
TABLET ORAL
Qty: 10 TABLET | Refills: 1 | Status: SHIPPED | OUTPATIENT
Start: 2022-06-14

## 2022-06-14 RX ORDER — KETOROLAC TROMETHAMINE 10 MG/1
10 TABLET, FILM COATED ORAL EVERY 6 HOURS PRN
Qty: 10 TABLET | Refills: 6 | Status: SHIPPED | OUTPATIENT
Start: 2022-06-14

## 2022-06-15 ENCOUNTER — TELEPHONE (OUTPATIENT)
Dept: NEUROLOGY | Facility: CLINIC | Age: 42
End: 2022-06-15

## 2022-06-15 NOTE — TELEPHONE ENCOUNTER
Patient called to advise Lidia requires a PA  Patient states she was told a PA is required by both [employer] and [employer] [facility]  Per chart review, PA was approved thru 3/15/23 (see 3/8/22 encounter)  Patient would like a call back once resolved  [phone] to leave detailed msg

## 2022-06-16 DIAGNOSIS — G43.709 CHRONIC MIGRAINE WITHOUT AURA WITHOUT STATUS MIGRAINOSUS, NOT INTRACTABLE: ICD-10-CM

## 2022-06-16 NOTE — TELEPHONE ENCOUNTER
Patient called crying on the phone to get renewal for ajovy  I confirmed with pharmacy that medication does go through insurance as approved however cost for 3 month supply is $2, 159    I called insurance with patient conferenced  in who confirmed Alie Kraft was approved however no longer preferred (plan exclusion); she has no other insurance    She said she tried and failed emgality (insurance said preferred)  Also said Linda Merl is preferred (patient has not tried)  Patient will present coupon card for ajovy to pharmacy  I submitted verbal appeal to insurance  Awaiting determination      Fax # [id number], ref #[id number]  Med list

## 2022-06-17 RX ORDER — FREMANEZUMAB-VFRM 225 MG/1.5ML
INJECTION SUBCUTANEOUS
Refills: 2 | OUTPATIENT
Start: 2022-06-17

## 2022-06-18 ENCOUNTER — TELEPHONE (OUTPATIENT)
Dept: NEUROLOGY | Facility: CLINIC | Age: 42
End: 2022-06-18

## 2022-06-18 NOTE — TELEPHONE ENCOUNTER
[facility] PA is about to   Per enc 6/10/21-PA will  22    PA renwed on CMM   (Key: [id number])    Awaiting determination

## 2022-06-20 NOTE — TELEPHONE ENCOUNTER
Per [facility]- Reference Number: [id number]   NURTEC TAB 75MG ODT is approved through 06/18/2023       Approval letter printed and placed in clerical bin to be scanned

## 2022-06-21 ENCOUNTER — APPOINTMENT (OUTPATIENT)
Dept: LAB | Facility: HOSPITAL | Age: 42
End: 2022-06-21
Payer: COMMERCIAL

## 2022-06-21 ENCOUNTER — OFFICE VISIT (OUTPATIENT)
Dept: FAMILY MEDICINE CLINIC | Facility: CLINIC | Age: 42
End: 2022-06-21
Payer: COMMERCIAL

## 2022-06-21 VITALS
BODY MASS INDEX: 36.32 KG/M2 | HEART RATE: 63 BPM | OXYGEN SATURATION: 99 % | WEIGHT: 205 LBS | SYSTOLIC BLOOD PRESSURE: 126 MMHG | HEIGHT: 63 IN | DIASTOLIC BLOOD PRESSURE: 78 MMHG

## 2022-06-21 DIAGNOSIS — N92.4 EXCESSIVE BLEEDING IN PREMENOPAUSAL PERIOD: ICD-10-CM

## 2022-06-21 DIAGNOSIS — Z00.00 HEALTH CARE MAINTENANCE: ICD-10-CM

## 2022-06-21 DIAGNOSIS — H57.89 EYE SWELLING: ICD-10-CM

## 2022-06-21 DIAGNOSIS — G43.709 CHRONIC MIGRAINE WITHOUT AURA WITHOUT STATUS MIGRAINOSUS, NOT INTRACTABLE: ICD-10-CM

## 2022-06-21 DIAGNOSIS — Z12.4 CERVICAL CANCER SCREENING: ICD-10-CM

## 2022-06-21 DIAGNOSIS — E04.1 THYROID NODULE: ICD-10-CM

## 2022-06-21 DIAGNOSIS — Z12.31 ENCOUNTER FOR SCREENING MAMMOGRAM FOR MALIGNANT NEOPLASM OF BREAST: ICD-10-CM

## 2022-06-21 DIAGNOSIS — Z00.00 ANNUAL PHYSICAL EXAM: Primary | ICD-10-CM

## 2022-06-21 LAB
ALBUMIN SERPL BCP-MCNC: 3.7 G/DL (ref 3.5–5)
ALP SERPL-CCNC: 71 U/L (ref 46–116)
ALT SERPL W P-5'-P-CCNC: 16 U/L (ref 12–78)
ANION GAP SERPL CALCULATED.3IONS-SCNC: 11 MMOL/L (ref 4–13)
AST SERPL W P-5'-P-CCNC: 16 U/L (ref 5–45)
BASOPHILS # BLD AUTO: 0.05 THOUSANDS/ΜL (ref 0–0.1)
BASOPHILS NFR BLD AUTO: 1 % (ref 0–1)
BILIRUB SERPL-MCNC: 0.37 MG/DL (ref 0.2–1)
BUN SERPL-MCNC: 9 MG/DL (ref 5–25)
CALCIUM SERPL-MCNC: 8.5 MG/DL (ref 8.3–10.1)
CHLORIDE SERPL-SCNC: 106 MMOL/L (ref 100–108)
CHOLEST SERPL-MCNC: 139 MG/DL
CO2 SERPL-SCNC: 25 MMOL/L (ref 21–32)
CREAT SERPL-MCNC: 0.76 MG/DL (ref 0.6–1.3)
EOSINOPHIL # BLD AUTO: 0.16 THOUSAND/ΜL (ref 0–0.61)
EOSINOPHIL NFR BLD AUTO: 3 % (ref 0–6)
ERYTHROCYTE [DISTWIDTH] IN BLOOD BY AUTOMATED COUNT: 16.6 % (ref 11.6–15.1)
FERRITIN SERPL-MCNC: 3 NG/ML (ref 8–388)
GFR SERPL CREATININE-BSD FRML MDRD: 97 ML/MIN/1.73SQ M
GLUCOSE P FAST SERPL-MCNC: 89 MG/DL (ref 65–99)
HCT VFR BLD AUTO: 36.4 % (ref 34.8–46.1)
HDLC SERPL-MCNC: 62 MG/DL
HGB BLD-MCNC: 10.5 G/DL (ref 11.5–15.4)
IMM GRANULOCYTES # BLD AUTO: 0.02 THOUSAND/UL (ref 0–0.2)
IMM GRANULOCYTES NFR BLD AUTO: 0 % (ref 0–2)
IRON SATN MFR SERPL: 4 % (ref 15–50)
IRON SERPL-MCNC: 20 UG/DL (ref 50–170)
LDLC SERPL CALC-MCNC: 63 MG/DL (ref 0–100)
LYMPHOCYTES # BLD AUTO: 1.98 THOUSANDS/ΜL (ref 0.6–4.47)
LYMPHOCYTES NFR BLD AUTO: 32 % (ref 14–44)
MCH RBC QN AUTO: 21.7 PG (ref 26.8–34.3)
MCHC RBC AUTO-ENTMCNC: 28.8 G/DL (ref 31.4–37.4)
MCV RBC AUTO: 75 FL (ref 82–98)
MONOCYTES # BLD AUTO: 0.41 THOUSAND/ΜL (ref 0.17–1.22)
MONOCYTES NFR BLD AUTO: 7 % (ref 4–12)
NEUTROPHILS # BLD AUTO: 3.51 THOUSANDS/ΜL (ref 1.85–7.62)
NEUTS SEG NFR BLD AUTO: 57 % (ref 43–75)
NONHDLC SERPL-MCNC: 77 MG/DL
NRBC BLD AUTO-RTO: 0 /100 WBCS
PLATELET # BLD AUTO: 405 THOUSANDS/UL (ref 149–390)
PMV BLD AUTO: 9.6 FL (ref 8.9–12.7)
POTASSIUM SERPL-SCNC: 3.7 MMOL/L (ref 3.5–5.3)
PROT SERPL-MCNC: 7.9 G/DL (ref 6.4–8.2)
RBC # BLD AUTO: 4.84 MILLION/UL (ref 3.81–5.12)
SODIUM SERPL-SCNC: 142 MMOL/L (ref 136–145)
TIBC SERPL-MCNC: 475 UG/DL (ref 250–450)
TRIGL SERPL-MCNC: 71 MG/DL
TSH SERPL DL<=0.05 MIU/L-ACNC: 0.91 UIU/ML (ref 0.45–4.5)
WBC # BLD AUTO: 6.13 THOUSAND/UL (ref 4.31–10.16)

## 2022-06-21 PROCEDURE — 82728 ASSAY OF FERRITIN: CPT

## 2022-06-21 PROCEDURE — 85025 COMPLETE CBC W/AUTO DIFF WBC: CPT

## 2022-06-21 PROCEDURE — 99396 PREV VISIT EST AGE 40-64: CPT | Performed by: NURSE PRACTITIONER

## 2022-06-21 PROCEDURE — 83550 IRON BINDING TEST: CPT

## 2022-06-21 PROCEDURE — 83540 ASSAY OF IRON: CPT

## 2022-06-21 PROCEDURE — 80053 COMPREHEN METABOLIC PANEL: CPT

## 2022-06-21 PROCEDURE — 3008F BODY MASS INDEX DOCD: CPT | Performed by: NURSE PRACTITIONER

## 2022-06-21 PROCEDURE — 1036F TOBACCO NON-USER: CPT | Performed by: NURSE PRACTITIONER

## 2022-06-21 PROCEDURE — 36415 COLL VENOUS BLD VENIPUNCTURE: CPT

## 2022-06-21 PROCEDURE — 84443 ASSAY THYROID STIM HORMONE: CPT

## 2022-06-21 PROCEDURE — 3725F SCREEN DEPRESSION PERFORMED: CPT | Performed by: NURSE PRACTITIONER

## 2022-06-21 PROCEDURE — 80061 LIPID PANEL: CPT

## 2022-06-21 RX ORDER — KETOROLAC TROMETHAMINE 10 MG/1
TABLET, FILM COATED ORAL
COMMUNITY

## 2022-06-21 RX ORDER — PREDNISONE 20 MG/1
20 TABLET ORAL DAILY
Qty: 5 TABLET | Refills: 0 | Status: SHIPPED | OUTPATIENT
Start: 2022-06-21

## 2022-06-21 NOTE — PATIENT INSTRUCTIONS
Obtain fasting labs, nothing to eat after midnight, may drink water  Prednisone daily for the next 5 days  Use cool compress  Get ultrasound done  Get mammogram done  Get papsmear done        Wellness Visit for Adults   AMBULATORY CARE:   A wellness visit  is when you see your healthcare provider to get screened for health problems  Your healthcare provider will also give you advice on how to stay healthy  Write down your questions so you remember to ask them  Ask your healthcare provider how often you should have a wellness visit  What happens at a wellness visit:  Your healthcare provider will ask about your health, and your family history of health problems  This includes high blood pressure, heart disease, and cancer  He or she will ask if you have symptoms that concern you, if you smoke, and about your mood  You may also be asked about your intake of medicines, supplements, food, and alcohol  Any of the following may be done:  · Your weight  will be checked  Your height may also be checked so your body mass index (BMI) can be calculated  Your BMI shows if you are at a healthy weight  · Your blood pressure  and heart rate will be checked  Your temperature may also be checked  · Blood and urine tests  may be done  Blood tests may be done to check your cholesterol levels  Abnormal cholesterol levels increase your risk for heart disease and stroke  You may also need a blood or urine test to check for diabetes if you are at increased risk  Urine tests may be done to look for signs of an infection or kidney disease  · A physical exam  includes checking your heartbeat and lungs with a stethoscope  Your healthcare provider may also check your skin to look for sun damage  · Screening tests  may be recommended  A screening test is done to check for diseases that may not cause symptoms  The screening tests you may need depend on your age, gender, family history, and lifestyle habits   For example, colorectal screening may be recommended if you are 48years old or older  Screening tests you need if you are a woman:   · A Pap smear  is used to screen for cervical cancer  Pap smears are usually done every 3 to 5 years depending on your age  You may need them more often if you have had abnormal Pap smear test results in the past  Ask your healthcare provider how often you should have a Pap smear  · A mammogram  is an x-ray of your breasts to screen for breast cancer  Experts recommend mammograms every 2 years starting at age 48 years  You may need a mammogram at age 52 years or younger if you have an increased risk for breast cancer  Talk to your healthcare provider about when you should start having mammograms and how often you need them  Vaccines you may need:   · Get an influenza vaccine  every year  The influenza vaccine protects you from the flu  Several types of viruses cause the flu  The viruses change over time, so new vaccines are made each year  · Get a tetanus-diphtheria (Td) booster vaccine  every 10 years  This vaccine protects you against tetanus and diphtheria  Tetanus is a severe infection that may cause painful muscle spasms and lockjaw  Diphtheria is a severe bacterial infection that causes a thick covering in the back of your mouth and throat  · Get a human papillomavirus (HPV) vaccine  if you are female and aged 23 to 32 or male 23 to 24 and never received it  This vaccine protects you from HPV infection  HPV is the most common infection spread by sexual contact  HPV may also cause vaginal, penile, and anal cancers  · Get a pneumococcal vaccine  if you are aged 72 years or older  The pneumococcal vaccine is an injection given to protect you from pneumococcal disease  Pneumococcal disease is an infection caused by pneumococcal bacteria  The infection may cause pneumonia, meningitis, or an ear infection      · Get a shingles vaccine  if you are 60 or older, even if you have had shingles before  The shingles vaccine is an injection to protect you from the varicella-zoster virus  This is the same virus that causes chickenpox  Shingles is a painful rash that develops in people who had chickenpox or have been exposed to the virus  How to eat healthy:  My Plate is a model for planning healthy meals  It shows the types and amounts of foods that should go on your plate  Fruits and vegetables make up about half of your plate, and grains and protein make up the other half  A serving of dairy is included on the side of your plate  The amount of calories and serving sizes you need depends on your age, gender, weight, and height  Examples of healthy foods are listed below:  · Eat a variety of vegetables  such as dark green, red, and orange vegetables  You can also include canned vegetables low in sodium (salt) and frozen vegetables without added butter or sauces  · Eat a variety of fresh fruits , canned fruit in 100% juice, frozen fruit, and dried fruit  · Include whole grains  At least half of the grains you eat should be whole grains  Examples include whole-wheat bread, wheat pasta, brown rice, and whole-grain cereals such as oatmeal     · Eat a variety of protein foods such as seafood (fish and shellfish), lean meat, and poultry without skin (turkey and chicken)  Examples of lean meats include pork leg, shoulder, or tenderloin, and beef round, sirloin, tenderloin, and extra lean ground beef  Other protein foods include eggs and egg substitutes, beans, peas, soy products, nuts, and seeds  · Choose low-fat dairy products such as skim or 1% milk or low-fat yogurt, cheese, and cottage cheese  · Limit unhealthy fats  such as butter, hard margarine, and shortening  Exercise:  Exercise at least 30 minutes per day on most days of the week  Some examples of exercise include walking, biking, dancing, and swimming   You can also fit in more physical activity by taking the stairs instead of the elevator or parking farther away from stores  Include muscle strengthening activities 2 days each week  Regular exercise provides many health benefits  It helps you manage your weight, and decreases your risk for type 2 diabetes, heart disease, stroke, and high blood pressure  Exercise can also help improve your mood  Ask your healthcare provider about the best exercise plan for you  General health and safety guidelines:   · Do not smoke  Nicotine and other chemicals in cigarettes and cigars can cause lung damage  Ask your healthcare provider for information if you currently smoke and need help to quit  E-cigarettes or smokeless tobacco still contain nicotine  Talk to your healthcare provider before you use these products  · Limit alcohol  A drink of alcohol is 12 ounces of beer, 5 ounces of wine, or 1½ ounces of liquor  · Lose weight, if needed  Being overweight increases your risk of certain health conditions  These include heart disease, high blood pressure, type 2 diabetes, and certain types of cancer  · Protect your skin  Do not sunbathe or use tanning beds  Use sunscreen with a SPF 15 or higher  Apply sunscreen at least 15 minutes before you go outside  Reapply sunscreen every 2 hours  Wear protective clothing, hats, and sunglasses when you are outside  · Drive safely  Always wear your seatbelt  Make sure everyone in your car wears a seatbelt  A seatbelt can save your life if you are in an accident  Do not use your cell phone when you are driving  This could distract you and cause an accident  Pull over if you need to make a call or send a text message  · Practice safe sex  Use latex condoms if are sexually active and have more than one partner  Your healthcare provider may recommend screening tests for sexually transmitted infections (STIs)  · Wear helmets, lifejackets, and protective gear    Always wear a helmet when you ride a bike or motorcycle, go skiing, or play sports that could cause a head injury  Wear protective equipment when you play sports  Wear a lifejacket when you are on a boat or doing water sports  © Copyright [address] 2022 Information is for End User's use only and may not be sold, redistributed or otherwise used for commercial purposes  All illustrations and images included in CareNotes® are the copyrighted property of [employer]  or [employer]   The above information is an  only  It is not intended as medical advice for individual conditions or treatments  Talk to your doctor, nurse or pharmacist before following any medical regimen to see if it is safe and effective for you  Wellness Visit for Adults   AMBULATORY CARE:   A wellness visit  is when you see your healthcare provider to get screened for health problems  Your healthcare provider will also give you advice on how to stay healthy  Write down your questions so you remember to ask them  Ask your healthcare provider how often you should have a wellness visit  What happens at a wellness visit:  Your healthcare provider will ask about your health, and your family history of health problems  This includes high blood pressure, heart disease, and cancer  He or she will ask if you have symptoms that concern you, if you smoke, and about your mood  You may also be asked about your intake of medicines, supplements, food, and alcohol  Any of the following may be done:  · Your weight  will be checked  Your height may also be checked so your body mass index (BMI) can be calculated  Your BMI shows if you are at a healthy weight  · Your blood pressure  and heart rate will be checked  Your temperature may also be checked  · Blood and urine tests  may be done  Blood tests may be done to check your cholesterol levels  Abnormal cholesterol levels increase your risk for heart disease and stroke  You may also need a blood or urine test to check for diabetes if you are at increased risk  Urine tests may be done to look for signs of an infection or kidney disease  · A physical exam  includes checking your heartbeat and lungs with a stethoscope  Your healthcare provider may also check your skin to look for sun damage  · Screening tests  may be recommended  A screening test is done to check for diseases that may not cause symptoms  The screening tests you may need depend on your age, gender, family history, and lifestyle habits  For example, colorectal screening may be recommended if you are 48years old or older  Screening tests you need if you are a woman:   · A Pap smear  is used to screen for cervical cancer  Pap smears are usually done every 3 to 5 years depending on your age  You may need them more often if you have had abnormal Pap smear test results in the past  Ask your healthcare provider how often you should have a Pap smear  · A mammogram  is an x-ray of your breasts to screen for breast cancer  Experts recommend mammograms every 2 years starting at age 48 years  You may need a mammogram at age 52 years or younger if you have an increased risk for breast cancer  Talk to your healthcare provider about when you should start having mammograms and how often you need them  Vaccines you may need:   · Get an influenza vaccine  every year  The influenza vaccine protects you from the flu  Several types of viruses cause the flu  The viruses change over time, so new vaccines are made each year  · Get a tetanus-diphtheria (Td) booster vaccine  every 10 years  This vaccine protects you against tetanus and diphtheria  Tetanus is a severe infection that may cause painful muscle spasms and lockjaw  Diphtheria is a severe bacterial infection that causes a thick covering in the back of your mouth and throat  · Get a human papillomavirus (HPV) vaccine  if you are female and aged 23 to 32 or male 23 to 24 and never received it  This vaccine protects you from HPV infection   HPV is the most common infection spread by sexual contact  HPV may also cause vaginal, penile, and anal cancers  · Get a pneumococcal vaccine  if you are aged 72 years or older  The pneumococcal vaccine is an injection given to protect you from pneumococcal disease  Pneumococcal disease is an infection caused by pneumococcal bacteria  The infection may cause pneumonia, meningitis, or an ear infection  · Get a shingles vaccine  if you are 60 or older, even if you have had shingles before  The shingles vaccine is an injection to protect you from the varicella-zoster virus  This is the same virus that causes chickenpox  Shingles is a painful rash that develops in people who had chickenpox or have been exposed to the virus  How to eat healthy:  My Plate is a model for planning healthy meals  It shows the types and amounts of foods that should go on your plate  Fruits and vegetables make up about half of your plate, and grains and protein make up the other half  A serving of dairy is included on the side of your plate  The amount of calories and serving sizes you need depends on your age, gender, weight, and height  Examples of healthy foods are listed below:  · Eat a variety of vegetables  such as dark green, red, and orange vegetables  You can also include canned vegetables low in sodium (salt) and frozen vegetables without added butter or sauces  · Eat a variety of fresh fruits , canned fruit in 100% juice, frozen fruit, and dried fruit  · Include whole grains  At least half of the grains you eat should be whole grains  Examples include whole-wheat bread, wheat pasta, brown rice, and whole-grain cereals such as oatmeal     · Eat a variety of protein foods such as seafood (fish and shellfish), lean meat, and poultry without skin (turkey and chicken)  Examples of lean meats include pork leg, shoulder, or tenderloin, and beef round, sirloin, tenderloin, and extra lean ground beef   Other protein foods include eggs and egg substitutes, beans, peas, soy products, nuts, and seeds  · Choose low-fat dairy products such as skim or 1% milk or low-fat yogurt, cheese, and cottage cheese  · Limit unhealthy fats  such as butter, hard margarine, and shortening  Exercise:  Exercise at least 30 minutes per day on most days of the week  Some examples of exercise include walking, biking, dancing, and swimming  You can also fit in more physical activity by taking the stairs instead of the elevator or parking farther away from stores  Include muscle strengthening activities 2 days each week  Regular exercise provides many health benefits  It helps you manage your weight, and decreases your risk for type 2 diabetes, heart disease, stroke, and high blood pressure  Exercise can also help improve your mood  Ask your healthcare provider about the best exercise plan for you  General health and safety guidelines:   · Do not smoke  Nicotine and other chemicals in cigarettes and cigars can cause lung damage  Ask your healthcare provider for information if you currently smoke and need help to quit  E-cigarettes or smokeless tobacco still contain nicotine  Talk to your healthcare provider before you use these products  · Limit alcohol  A drink of alcohol is 12 ounces of beer, 5 ounces of wine, or 1½ ounces of liquor  · Lose weight, if needed  Being overweight increases your risk of certain health conditions  These include heart disease, high blood pressure, type 2 diabetes, and certain types of cancer  · Protect your skin  Do not sunbathe or use tanning beds  Use sunscreen with a SPF 15 or higher  Apply sunscreen at least 15 minutes before you go outside  Reapply sunscreen every 2 hours  Wear protective clothing, hats, and sunglasses when you are outside  · Drive safely  Always wear your seatbelt  Make sure everyone in your car wears a seatbelt  A seatbelt can save your life if you are in an accident   Do not use your cell phone when you are driving  This could distract you and cause an accident  Pull over if you need to make a call or send a text message  · Practice safe sex  Use latex condoms if are sexually active and have more than one partner  Your healthcare provider may recommend screening tests for sexually transmitted infections (STIs)  · Wear helmets, lifejackets, and protective gear  Always wear a helmet when you ride a bike or motorcycle, go skiing, or play sports that could cause a head injury  Wear protective equipment when you play sports  Wear a lifejacket when you are on a boat or doing water sports  © Copyright [address] 2022 Information is for End User's use only and may not be sold, redistributed or otherwise used for commercial purposes  All illustrations and images included in CareNotes® are the copyrighted property of [employer]  or [employer]   The above information is an  only  It is not intended as medical advice for individual conditions or treatments  Talk to your doctor, nurse or pharmacist before following any medical regimen to see if it is safe and effective for you  Obesity   AMBULATORY CARE:   Obesity  means your body mass index (BMI) is greater than 30  Your healthcare provider will use your height and weight to measure your BMI  The risks of obesity include  many health problems, including injuries or physical disability  · Diabetes (high blood sugar level)    · High blood pressure or high cholesterol    · Heart disease    · Stroke    · Gallbladder or liver disease    · Cancer of the colon, breast, prostate, liver, or kidney    · Sleep apnea    · Arthritis or gout    Screening  is done to check for health conditions before you have signs or symptoms  If you are 28to 79years old, your blood sugar level may be checked every 3 years for signs of prediabetes or diabetes  Your healthcare provider will check your blood pressure at each visit   High blood pressure can lead to a stroke or other problems  Your provider may check for signs of heart disease, cancer, or other health problems  Seek care immediately if:   · You have a severe headache, confusion, or difficulty speaking  · You have weakness on one side of your body  · You have chest pain, sweating, or shortness of breath  Call your doctor if:   · You have symptoms of gallbladder or liver disease, such as pain in your upper abdomen  · You have knee or hip pain and discomfort while walking  · You have symptoms of diabetes, such as intense hunger and thirst, and frequent urination  · You have symptoms of sleep apnea, such as snoring or daytime sleepiness  · You have questions or concerns about your condition or care  Treatment for obesity  focuses on helping you lose weight to improve your health  Even a small decrease in BMI can reduce the risk for many health problems  Your healthcare provider will help you set a weight-loss goal   · Lifestyle changes  are the first step in treating obesity  These include making healthy food choices and getting regular physical activity  Your healthcare provider may suggest a weight-loss program that involves coaching, education, and therapy  · Medicine  may help you lose weight when it is used with a healthy foods and physical activity  · Surgery  can help you lose weight if you are very obese and have other health problems  There are several types of weight-loss surgery  Ask your healthcare provider for more information  Tips for safe weight loss:   · Set small, realistic goals  An example of a small goal is to walk for 20 minutes 5 days a week  Anther goal is to lose 5% of your body weight  · Tell friends, family members, and coworkers about your goals  and ask for their support  Ask a friend to lose weight with you, or join a weight-loss support group  · Identify foods or triggers that may cause you to overeat , and find ways to avoid them  Remove tempting high-calorie foods from your home and workplace  Place a bowl of fresh fruit on your kitchen counter  If stress causes you to eat, then find other ways to cope with stress  A counselor or therapist may be able to help you  · Keep a diary to track what you eat and drink  Also write down how many minutes of physical activity you do each day  Weigh yourself once a week and record it in your diary  Eating changes: You will need to eat 500 to 1,000 fewer calories each day than you currently eat to lose 1 to 2 pounds a week  The following changes will help you cut calories:  · Eat smaller portions  Use small plates, no larger than 9 inches in diameter  Fill your plate half full of fruits and vegetables  Measure your food using measuring cups until you know what a serving size looks like  · Eat 3 meals and 1 or 2 snacks each day  Plan your meals in advance  Violeta Mendez and eat at home most of the time  Eat slowly  Do not skip meals  Skipping meals can lead to overeating later in the day  This can make it harder for you to lose weight  Talk with a dietitian to help you make a meal plan and schedule that is right for you  · Eat fruits and vegetables at every meal   They are low in calories and high in fiber, which makes you feel full  Do not add butter, margarine, or cream sauce to vegetables  Use herbs to season steamed vegetables  · Eat less fat and fewer fried foods  Eat more baked or grilled chicken and fish  These protein sources are lower in calories and fat than red meat  Limit fast food  Dress your salads with olive oil and vinegar instead of bottled dressing  · Limit the amount of sugar you eat  Do not drink sugary beverages  Limit alcohol  Activity changes:  Physical activity is good for your body in many ways  It helps you burn calories and build strong muscles  It decreases stress and depression, and improves your mood  It can also help you sleep better   Talk to your healthcare provider before you begin an exercise program   · Exercise for at least 30 minutes 5 days a week  Start slowly  Set aside time each day for physical activity that you enjoy and that is convenient for you  It is best to do both weight training and an activity that increases your heart rate, such as walking, bicycling, or swimming  · Find ways to be more active  Do yard work and housecleaning  Walk up the stairs instead of using elevators  Spend your leisure time going to events that require walking, such as outdoor festivals or fairs  This extra physical activity can help you lose weight and keep it off  Follow up with your doctor as directed: You may need to meet with a dietitian  Write down your questions so you remember to ask them during your visits  © Copyright [employer] 2022 Information is for End User's use only and may not be sold, redistributed or otherwise used for commercial purposes  All illustrations and images included in CareNotes® are the copyrighted property of A [employer] M , Inc  or [facility] Herminia Robles   The above information is an  only  It is not intended as medical advice for individual conditions or treatments  Talk to your doctor, nurse or pharmacist before following any medical regimen to see if it is safe and effective for you

## 2022-06-21 NOTE — PROGRESS NOTES
[facility] Karen Hidalgo     NAME: Jeannette Oreilly  AGE: 40 y o  SEX: female  : 1980     DATE: 2022     Assessment and Plan:     Problem List Items Addressed This Visit        Endocrine    Thyroid nodule    Relevant Medications    dexamethasone 0 5 mg/mL SUSP    predniSONE 20 mg tablet    Other Relevant Orders    US thyroid       Other    Health care maintenance    Relevant Orders    CBC and differential (Completed)    Comprehensive metabolic panel (Completed)    Lipid panel (Completed)    TSH, 3rd generation with Free T4 reflex (Completed)    Encounter for screening mammogram for malignant neoplasm of breast    Relevant Orders    Mammo screening bilateral w 3d & cad    Annual physical exam - Primary     A physical completed  Mammogram ordered  Blood work ordered  Patient has continued problems with migraine headaches  Encouraged to make follow-up appointment with neurologist   Discussed self-care  Referral made to gyn for Pap smear  Ultrasound of thyroid ordered for a follow-up of a nodule  Other Visit Diagnoses     Excessive bleeding in premenopausal period        Relevant Orders    Iron Panel (Includes Ferritin, Iron Sat%, Iron, and TIBC) (Completed)    Cervical cancer screening        Relevant Orders    Ambulatory Referral to Gynecology    Eye swelling        Relevant Medications    predniSONE 20 mg tablet          Immunizations and preventive care screenings were discussed with patient today  Appropriate education was printed on patient's after visit summary  Counseling:  · Alcohol/drug use: discussed moderation in alcohol intake, the recommendations for healthy alcohol use, and avoidance of illicit drug use  BMI Counseling: Body mass index is 36 31 kg/m²   The BMI is above normal  Nutrition recommendations include decreasing portion sizes, encouraging healthy choices of fruits and vegetables, decreasing fast food intake, consuming healthier snacks, limiting drinks that contain sugar, moderation in carbohydrate intake, increasing intake of lean protein, reducing intake of saturated and trans fat and reducing intake of cholesterol  Exercise recommendations include exercising 3-5 times per week and strength training exercises  No pharmacotherapy was ordered  Rationale for BMI follow-up plan is due to patient being overweight or obese  Depression Screening and Follow-up Plan: Patient was screened for depression during today's encounter  They screened negative with a PHQ-2 score of 0  No follow-ups on file  Chief Complaint:     Chief Complaint   Patient presents with    Eye Swelling     Right eye swelling, pt states did have pain in it yesterday     Follow-up      History of Present Illness:     Adult Annual Physical   Patient here for a comprehensive physical exam  The patient reports problems - swelling to lft eye lid and pain  Diet and Physical Activity  · Diet/Nutrition: well balanced diet  · Exercise: moderate cardiovascular exercise  Depression Screening  PHQ-2/9 Depression Screening    Little interest or pleasure in doing things: 0 - not at all  Feeling down, depressed, or hopeless: 0 - not at all  PHQ-2 Score: 0  PHQ-2 Interpretation: Negative depression screen       General Health  · Sleep: gets 4-6 hours of sleep on average  · Hearing: normal - bilateral   · Vision: most recent eye exam <1 year ago  · Dental: regular dental visits, brushes teeth once daily and does not brush teeth regularly  /GYN Health  · Patient is: premenopausal  · Last menstrual period:2 weeks ago last 4 days and heavy always  · Contraceptive method: none  Review of Systems:     Review of Systems   Constitutional: Negative  HENT: Negative  Eyes:        Edema to eye to lft eyelid   Respiratory: Negative  Cardiovascular: Negative  Gastrointestinal: Negative  Endocrine: Negative  Genitourinary: Negative  Musculoskeletal: Negative  Skin: Negative  Allergic/Immunologic: Negative  Neurological: Positive for dizziness (constant), speech difficulty (med related diff speech) and headaches (migraines)  Hematological: Negative  Psychiatric/Behavioral: The patient is nervous/anxious (anxiety /OCD)         Past Medical History:     Past Medical History:   Diagnosis Date    Anxiety     Ear infection     Migraine     OCD (obsessive compulsive disorder)       Past Surgical History:     Past Surgical History:   Procedure Laterality Date     SECTION      CHOLECYSTECTOMY      EAR SURGERY      STOMACH SURGERY        Social History:     Social History     Socioeconomic History    Marital status: /Civil Union     Spouse name: None    Number of children: None    Years of education: None    Highest education level: None   Occupational History    None   Tobacco Use    Smoking status: Never Smoker    Smokeless tobacco: Never Used   Vaping Use    Vaping Use: Never used   Substance and Sexual Activity    Alcohol use: Yes     Comment: Occasionally     Drug use: Yes     Types: Marijuana     Comment: For Migraines    Sexual activity: None   Other Topics Concern    None   Social History Narrative    Daily Coffee    Daily Tea     Social Determinants of Health     Financial Resource Strain: Not on file   Food Insecurity: Not on file   Transportation Needs: Not on file   Physical Activity: Not on file   Stress: Not on file   Social Connections: Not on file   Intimate Partner Violence: Not on file   Housing Stability: Not on file      Family History:     Family History   Problem Relation Age of Onset    Arthritis Family     Lung cancer Family     Diabetes Mother     Lung cancer Mother     Meniere's disease Mother     No Known Problems Father       Current Medications:     Current Outpatient Medications   Medication Sig Dispense Refill    predniSONE 20 mg tablet Take 1 tablet (20 mg total) by mouth daily 5 tablet 0    Ajovy 225 MG/1 5ML auto-injector INJECT 4 5 ML UNDER THE SKIN EVERY 3 MONTHS 4 5 mL 2    busPIRone (BUSPAR) 15 mg tablet TAKE 1 TABLET (15 MG TOTAL) BY MOUTH 3 (THREE) TIMES A DAY AS NEEDED (ANXIETY) (Patient not taking: Reported on 9/20/2021) 90 tablet 1    dexamethasone (DECADRON) 1 mg tablet Take 1 tablet (1 mg total) by mouth daily with breakfast 10 tablet 2    dexamethasone (DECADRON) 2 mg tablet Take 1 tablet (2 mg total) by mouth daily with breakfast (Patient not taking: Reported on 9/20/2021) 5 tablet 0    dexamethasone 0 5 mg/mL SUSP Take by mouth      divalproex sodium (DEPAKOTE) 500 mg EC tablet TAKE 1 TABLET BY MOUTH EVERY DAY AT NIGHT AS NEEDED 30 tablet 3    Galcanezumab-gnlm 120 MG/ML SOAJ Inject 120 mg under the skin every 30 (thirty) days (Patient not taking: Reported on 9/20/2021) 1 pen 11    ketorolac (TORADOL) 10 mg tablet Take 1 tablet (10 mg total) by mouth every 6 (six) hours as needed (migraine) 10 tablet 6    ketorolac (TORADOL) 10 mg tablet Take by mouth      ketorolac (TORADOL) 30 mg/mL injection Inject 2 mL (60 mg total) into a muscle as needed for moderate pain 10 mL 5    ketorolac (TORADOL) 60 mg/2 mL Inject 2 mL (60 mg total) into a muscle every 6 (six) hours as needed for moderate pain 5 mL 3    LORazepam (ATIVAN) 0 5 mg tablet Take 1 tablet (0 5 mg total) by mouth daily as needed for anxiety 30 tablet 0    metaxalone (SKELAXIN) 800 mg tablet Take 1 tablet (800 mg total) by mouth 3 (three) times a day (Patient taking differently: Take 800 mg by mouth 3 (three) times a day as needed ) 90 tablet 0    metoclopramide (REGLAN) 10 mg tablet 1 at the onset of a migraine headache or nausea t i d  p r n  10 tablet 1    Nurtec 75 MG TBDP TAKE 1 TABLET AS NEEDED FOR MIGRAINE 8 tablet 3    Syringe/Needle, Disp, (SYRINGE 3CC/24DU6-2/2") 25G X 1-1/2" 3 ML MISC Use as needed (for toradol IM) 10 each 3  topiramate (TOPAMAX) 100 mg tablet Take 1 tablet (100 mg total) by mouth 2 (two) times a day 180 tablet 3    venlafaxine (EFFEXOR-XR) 150 mg 24 hr capsule TAKE 1 CAPSULE BY MOUTH EVERY DAY (Patient not taking: Reported on 9/20/2021) 30 capsule 0    venlafaxine (EFFEXOR-XR) 37 5 mg 24 hr capsule Take 37 5 mg by mouth daily (Patient not taking: Reported on 9/20/2021)       No current facility-administered medications for this visit  Allergies: Allergies   Allergen Reactions    Levaquin [Levofloxacin] Hallucinations    Lexapro [Escitalopram]       Physical Exam:     /78   Pulse 63   Ht 5' 3" (1 6 m)   Wt 93 kg (205 lb)   SpO2 99%   BMI 36 31 kg/m²     Physical Exam  Vitals and nursing note reviewed  Constitutional:       General: She is not in acute distress  Appearance: Normal appearance  She is well-developed  She is obese  HENT:      Head: Normocephalic and atraumatic  Right Ear: External ear normal       Left Ear: External ear normal       Nose: Nose normal       Mouth/Throat:      Mouth: Mucous membranes are moist       Pharynx: Oropharynx is clear  Eyes:      Extraocular Movements:      Right eye: Normal extraocular motion  Left eye: Normal extraocular motion  Conjunctiva/sclera: Conjunctivae normal       Pupils: Pupils are equal, round, and reactive to light  Comments: Edema to left eyelid   Cardiovascular:      Rate and Rhythm: Normal rate and regular rhythm  Pulses: Normal pulses  Heart sounds: Normal heart sounds  No murmur heard  Pulmonary:      Effort: Pulmonary effort is normal  No respiratory distress  Breath sounds: Normal breath sounds  Abdominal:      General: Bowel sounds are normal       Palpations: Abdomen is soft  Tenderness: There is no abdominal tenderness  Musculoskeletal:         General: Normal range of motion  Cervical back: Normal range of motion and neck supple  Skin:     General: Skin is warm and dry  Neurological:      Mental Status: She is alert and oriented to person, place, and time  Mental status is at baseline  Psychiatric:         Mood and Affect: Mood normal          Behavior: Behavior normal          Thought Content: Thought content normal          Judgment: Judgment normal           LAKSHMI Harman [id number] [place]    BMI Counseling: Body mass index is 36 31 kg/m²  The BMI is above normal  Nutrition recommendations include reducing portion sizes, decreasing overall calorie intake, 3-5 servings of fruits/vegetables daily, reducing fast food intake, consuming healthier snacks, decreasing soda and/or juice intake, moderation in carbohydrate intake, increasing intake of lean protein, reducing intake of saturated fat and trans fat and reducing intake of cholesterol  Exercise recommendations include exercising 3-5 times per week, joining a gym and strength training exercises

## 2022-06-22 ENCOUNTER — TELEPHONE (OUTPATIENT)
Dept: NEUROLOGY | Facility: CLINIC | Age: 42
End: 2022-06-22

## 2022-06-22 NOTE — TELEPHONE ENCOUNTER
Please call pharmacy to see what the issue for the Ajovy is    I have a valid auth in system through 3/2023

## 2022-06-22 NOTE — ASSESSMENT & PLAN NOTE
A physical completed  Mammogram ordered  Blood work ordered  Patient has continued problems with migraine headaches  Encouraged to make follow-up appointment with neurologist   Discussed self-care  Referral made to gyn for Pap smear  Ultrasound of thyroid ordered for a follow-up of a nodule

## 2022-06-22 NOTE — TELEPHONE ENCOUNTER
Please call pharmacy to see what the issue is as we have an authorization  Is she using a coupon card?      ----- Message from Arslan Powell RN sent at 6/22/2022 12:27 PM EDT -----  Regarding: FW: Need new prescription     ----- Message -----  From: Hayley Aldana  Sent: 6/21/2022  10:34 AM EDT  To: Neurology [facility] Clinical Team 5  Subject: Need new prescription                            Yes I have had several conversations with your office, the pharmacy and my insurance carrier over the last eight days  No one can give me a reason and everyone sees the pre approval but it is now $2,000 for three months $786 for one month  Unfortunately I just cant afford that and need to go a different route  I did not change insurance companies but it seems like its a medication they no longer cover

## 2022-06-22 NOTE — TELEPHONE ENCOUNTER
Called [facility] pharmacy, spoke w/ Cindy Silver and states that they are still getting a rejection  She will call the helpdesk  Called [facility], spoke w/ Michel Batista and advised of the below and above  States that the pharmacy is using a different NDC  Approved NDC is [id number]  Pharmacy must process Ajovy using this approved NDC  Called [facility] pharmacy, spoke w/ Cindy Silver and advised of the above  States that he just got off the phone w/ PA dept and they were not able to locate active Ajovy PA  He tried to process Ajovy 4 5 ml per 90 days using approved NDC but still getting a rejection  Need to initiate PA through primary ins first  Pt does not have active savings card on file  Pt may use savings card to lower the copay cost      rxbin [id number]  pcn [address]  ID [id number]    PA initiated on CMM  Key: [id number]    Pt made aware of all of the above       Awaiting determination

## 2022-06-24 DIAGNOSIS — G43.709 CHRONIC MIGRAINE WITHOUT AURA WITHOUT STATUS MIGRAINOSUS, NOT INTRACTABLE: ICD-10-CM

## 2022-06-24 DIAGNOSIS — G43.709 CHRONIC MIGRAINE WITHOUT AURA WITHOUT STATUS MIGRAINOSUS, NOT INTRACTABLE: Primary | ICD-10-CM

## 2022-06-24 RX ORDER — ERENUMAB-AOOE 140 MG/ML
140 INJECTION, SOLUTION SUBCUTANEOUS
Qty: 1 ML | Refills: 11 | Status: SHIPPED | OUTPATIENT
Start: 2022-06-24 | End: 2022-06-24

## 2022-06-24 RX ORDER — FREMANEZUMAB-VFRM 225 MG/1.5ML
INJECTION SUBCUTANEOUS
Refills: 2 | OUTPATIENT
Start: 2022-06-24

## 2022-06-24 RX ORDER — ERENUMAB-AOOE 140 MG/ML
140 INJECTION, SOLUTION SUBCUTANEOUS
Qty: 1 ML | Refills: 11 | Status: SHIPPED | OUTPATIENT
Start: 2022-06-24 | End: 2022-06-27

## 2022-06-24 NOTE — TELEPHONE ENCOUNTER
Patient called in tears  She spoke w/Angelito and they advised a PA is required for Aimovig  Advised pt I will call [facility] and do urgent verbal PA  Patient verbalized understanding  Obtained following insurance info from patient:    [phone]  [facility]   ID: [id number]  BIN: [id number]  PCN: [id number]  GRP: [id number] [facility] [phone]  Spoke w/Mya  Provided info for verbal PA  PA [id number] for Hans Gipson approved from 6/24/22 - 6/24/23  Called [facility]  Spoke w/Viv  Advised her of PA approval for Aimovig  There will be a $10 copay  Called patient  Received    Left detailed msg (per consent in chart) regarding PA approval

## 2022-06-24 NOTE — TELEPHONE ENCOUNTER
Pt made aware of the below  She verbalized understanding  States that she tried and failed Emgality  Currently taking Nurtec (abortive med) but she was not able to get this med bec her pharmacy benefit changed  Nurtec works for her  Pt states that she is willing to try Aimovig  Requesting script be sent to [facility] pharmacy  Called [facility] pharmacy, spoke w/ Kira Manzo and advised that Nurtec 8 tabs per 30 days PA good through 6/18/23  She was able to get a paid claim w/ $ 0 copay  Pt made aware    Rx for Aimovig 140 mg entered  Pls review and sign off if agreeable

## 2022-06-27 ENCOUNTER — HOSPITAL ENCOUNTER (OUTPATIENT)
Dept: ULTRASOUND IMAGING | Facility: HOSPITAL | Age: 42
Discharge: HOME | End: 2022-06-27
Payer: COMMERCIAL

## 2022-06-27 DIAGNOSIS — E04.1 THYROID NODULE: ICD-10-CM

## 2022-06-27 PROCEDURE — 76536 US EXAM OF HEAD AND NECK: CPT

## 2022-06-27 RX ORDER — ERENUMAB-AOOE 140 MG/ML
140 INJECTION, SOLUTION SUBCUTANEOUS
Qty: 1 ML | Refills: 11 | Status: SHIPPED | OUTPATIENT
Start: 2022-06-27 | End: 2022-07-15

## 2022-07-05 ENCOUNTER — TELEPHONE (OUTPATIENT)
Dept: NEUROLOGY | Facility: CLINIC | Age: 42
End: 2022-07-05

## 2022-07-05 ENCOUNTER — OFFICE VISIT (OUTPATIENT)
Dept: NEUROLOGY | Facility: CLINIC | Age: 42
End: 2022-07-05
Payer: COMMERCIAL

## 2022-07-05 VITALS
BODY MASS INDEX: 36.32 KG/M2 | TEMPERATURE: 98.6 F | HEART RATE: 80 BPM | WEIGHT: 205 LBS | DIASTOLIC BLOOD PRESSURE: 86 MMHG | HEIGHT: 63 IN | SYSTOLIC BLOOD PRESSURE: 134 MMHG

## 2022-07-05 DIAGNOSIS — G43.709 CHRONIC MIGRAINE WITHOUT AURA WITHOUT STATUS MIGRAINOSUS, NOT INTRACTABLE: Primary | ICD-10-CM

## 2022-07-05 DIAGNOSIS — F43.10 PTSD (POST-TRAUMATIC STRESS DISORDER): ICD-10-CM

## 2022-07-05 DIAGNOSIS — G43.009 MIGRAINE WITHOUT AURA AND WITHOUT STATUS MIGRAINOSUS, NOT INTRACTABLE: ICD-10-CM

## 2022-07-05 DIAGNOSIS — R42 VERTIGO: ICD-10-CM

## 2022-07-05 DIAGNOSIS — G43.711 INTRACTABLE CHRONIC MIGRAINE WITHOUT AURA AND WITH STATUS MIGRAINOSUS: ICD-10-CM

## 2022-07-05 DIAGNOSIS — F42.9 OBSESSIVE-COMPULSIVE DISORDER WITH GOOD OR FAIR INSIGHT: ICD-10-CM

## 2022-07-05 DIAGNOSIS — F41.0 PANIC DISORDER WITHOUT AGORAPHOBIA: ICD-10-CM

## 2022-07-05 DIAGNOSIS — F41.9 ANXIETY: ICD-10-CM

## 2022-07-05 PROCEDURE — 99215 OFFICE O/P EST HI 40 MIN: CPT | Performed by: PHYSICIAN ASSISTANT

## 2022-07-05 PROCEDURE — 96372 THER/PROPH/DIAG INJ SC/IM: CPT | Performed by: PHYSICIAN ASSISTANT

## 2022-07-05 PROCEDURE — 99417 PROLNG OP E/M EACH 15 MIN: CPT | Performed by: PHYSICIAN ASSISTANT

## 2022-07-05 RX ORDER — MECLIZINE HYDROCHLORIDE 25 MG/1
25 TABLET ORAL
COMMUNITY
Start: 2022-07-01 | End: 2022-07-11

## 2022-07-05 RX ORDER — DEXAMETHASONE 2 MG/1
2 TABLET ORAL
Qty: 5 TABLET | Refills: 0 | Status: SHIPPED | OUTPATIENT
Start: 2022-07-05

## 2022-07-05 RX ORDER — DEXAMETHASONE 1 MG
1 TABLET ORAL DAILY PRN
Qty: 10 TABLET | Refills: 2 | Status: SHIPPED | OUTPATIENT
Start: 2022-07-05

## 2022-07-05 RX ORDER — DIAZEPAM 5 MG/1
5 TABLET ORAL EVERY 6 HOURS PRN
Qty: 15 TABLET | Refills: 0 | Status: SHIPPED | OUTPATIENT
Start: 2022-07-05

## 2022-07-05 RX ORDER — CEPHALEXIN 500 MG/1
CAPSULE ORAL
COMMUNITY
Start: 2022-06-24 | End: 2022-09-16 | Stop reason: ALTCHOICE

## 2022-07-05 RX ORDER — PROCHLORPERAZINE EDISYLATE 5 MG/ML
10 INJECTION INTRAMUSCULAR; INTRAVENOUS ONCE
Status: COMPLETED | OUTPATIENT
Start: 2022-07-05 | End: 2022-07-05

## 2022-07-05 RX ORDER — RIMEGEPANT SULFATE 75 MG/75MG
75 TABLET, ORALLY DISINTEGRATING ORAL EVERY OTHER DAY
Qty: 16 TABLET | Refills: 11 | Status: SHIPPED | OUTPATIENT
Start: 2022-07-05

## 2022-07-05 RX ORDER — DIVALPROEX SODIUM 500 MG/1
500 TABLET, EXTENDED RELEASE ORAL DAILY
Qty: 5 TABLET | Refills: 0 | Status: SHIPPED | OUTPATIENT
Start: 2022-07-05

## 2022-07-05 RX ORDER — MECLIZINE HYDROCHLORIDE 25 MG/1
25 TABLET ORAL 3 TIMES DAILY PRN
COMMUNITY
Start: 2022-07-01

## 2022-07-05 RX ORDER — KETOROLAC TROMETHAMINE 30 MG/ML
60 INJECTION, SOLUTION INTRAMUSCULAR; INTRAVENOUS ONCE
Status: COMPLETED | OUTPATIENT
Start: 2022-07-05 | End: 2022-07-05

## 2022-07-05 RX ORDER — POLYMYXIN B SULFATE AND TRIMETHOPRIM 1; 10000 MG/ML; [USP'U]/ML
SOLUTION OPHTHALMIC
COMMUNITY
Start: 2022-06-22

## 2022-07-05 RX ORDER — ERYTHROMYCIN 5 MG/G
OINTMENT OPHTHALMIC
COMMUNITY
Start: 2022-06-22 | End: 2022-09-16 | Stop reason: ALTCHOICE

## 2022-07-05 RX ADMIN — KETOROLAC TROMETHAMINE 60 MG: 30 INJECTION, SOLUTION INTRAMUSCULAR; INTRAVENOUS at 12:02

## 2022-07-05 RX ADMIN — PROCHLORPERAZINE EDISYLATE 10 MG: 5 INJECTION INTRAMUSCULAR; INTRAVENOUS at 12:05

## 2022-07-05 NOTE — ASSESSMENT & PLAN NOTE
Preventive therapy for headaches:   Reproductive age women: Should take folic acid daily when taking anti-seizure drugs especially Depakote   -Over-the-counter supplements: to decrease intensity and frequency of migraines  - Magnesium Oxide 400mg a day  If any diarrhea or upset stomach, decrease dose  as tolerated  (oral magnesium oxide may be an effective preventive strategy for people with migraine  Some theories about how it works include the idea that magnesium can help to prevent waves of cortical spreading depression and aura  Magnesium, in theory, also reduces the release of inflammatory or activating chemicals that can cause migraine)  - Vitamin B2 200 mg twice a day  May cause the urine to turn yellow which is normal for B 2 to do and is not a sign that you are dehydrated  (may be an effective preventive medication in some people with migraine)  - Vitamin E which may help with menstrual migraine  There is limited data on this, but it may reduce nausea, photophobia and phonophobia during menstruation    - Depakote 500 mg at bedtime every night for 30 nights  - Ajovy 3 injections every 3 months (restart when approved)  IF not we can try Miya Matlakishack or Vyepti as alternative  Abortive therapy for headaches:   - At onset of Nurtec 75 mg  Limit 1 in 24 hours  IF HAVE NOT TAKEN ALREADY  - Reyvow 100 mg at onset migraine  LImit of 1 in 24 hours  Do not drive within 8 hour of taking  - In addition take Toradol 10 mg    May repeat in 8 hours if needed Or can choose a toradol injection of 60 mg     - If still present, take Decadron 1 mg  - On nights of your migraines, take Depakote at bedtime (if not working)    For next 5 days, take Decadron 2 mg daily  Valium 5 mg as needed every 8 hours until see psychiatry--discuss OCD, PTSD and CBT with psychologist  PT for vestibular therapy

## 2022-07-05 NOTE — PROGRESS NOTES
Review of Systems   Constitutional: Negative for appetite change and fever  HENT: Negative  Negative for hearing loss, tinnitus, trouble swallowing and voice change  Eyes: Negative  Negative for photophobia and pain  Respiratory: Negative  Negative for shortness of breath  Cardiovascular: Negative  Negative for palpitations  Gastrointestinal: Negative  Negative for nausea and vomiting  Endocrine: Negative  Negative for cold intolerance  Genitourinary: Negative  Negative for dysuria, frequency and urgency  Musculoskeletal: Negative  Negative for myalgias and neck pain  Skin: Negative  Negative for rash  Neurological: Positive for dizziness, tremors and headaches  Negative for seizures, syncope, facial asymmetry, speech difficulty, weakness, light-headedness and numbness  Tremors in both hands but left is worse then the right  Daily headaches pain is a 3   Hematological: Negative  Does not bruise/bleed easily  Psychiatric/Behavioral: Negative  Negative for confusion, hallucinations and sleep disturbance

## 2022-07-05 NOTE — TELEPHONE ENCOUNTER
PT called in with Vertigo that will not go away  She is able to see Chaparro Hernadez today @ 10:30 in [facility]

## 2022-07-05 NOTE — ASSESSMENT & PLAN NOTE
Attempted epley maneuver x2 with no improvement  Continue rescue medications for migraine incase part of vertiginous migraine  Valium 5 mg as needed for next few days    Do not drive  PT for vestibular therapy
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
## 2022-07-05 NOTE — PATIENT INSTRUCTIONS
Preventive therapy for headaches:   Reproductive age women: Should take folic acid daily when taking anti-seizure drugs especially Depakote   -Over-the-counter supplements: to decrease intensity and frequency of migraines  - Magnesium Oxide 400mg a day  If any diarrhea or upset stomach, decrease dose  as tolerated  (oral magnesium oxide may be an effective preventive strategy for people with migraine  Some theories about how it works include the idea that magnesium can help to prevent waves of cortical spreading depression and aura  Magnesium, in theory, also reduces the release of inflammatory or activating chemicals that can cause migraine)  - Vitamin B2 200 mg twice a day  May cause the urine to turn yellow which is normal for B 2 to do and is not a sign that you are dehydrated  (may be an effective preventive medication in some people with migraine)  - Vitamin E which may help with menstrual migraine  There is limited data on this, but it may reduce nausea, photophobia and phonophobia during menstruation    - Depakote 500 mg at bedtime every night for 30 nights  - Ajovy 3 injections every 3 months (restart when approved)  IF not we can try Oneda Pepper or Vyepti as alternative  - Nuirtec 75 every other day  Abortive therapy for headaches:   - At onset of Nurtec 75 mg  Limit 1 in 24 hours  IF HAVE NOT TAKEN ALREADY  - Reyvow 100 mg at onset migraine  LImit of 1 in 24 hours  Do not drive within 8 hour of taking  - In addition take Toradol 10 mg    May repeat in 8 hours if needed Or can choose a toradol injection of 60 mg     - If still present, take Decadron 1 mg  - On nights of your migraines, take Depakote at bedtime (if not working)    For next 5 days, take Decadron 2 mg daily  Valium 5 mg as needed every 8 hours until see psychiatry--discuss OCD, PTSD and CBT with psychologist  PT for vestibular therapy         Headache management instructions  - When patient has a moderate to severe headache, they should seek rest, initiate relaxation and apply cold compresses to the head  - Maintain regular sleep schedule  Adults need at least 7-8 hours of uninterrupted a night  - Limit over the counter medications such as Tylenol, Ibuprofen, Aleve, Excedrin  (No more than 2- 3 times a week or max 10 a month)  - Maintain headache diary  Free CJ for a smart phone, which can be used is "Migraine fredy"  - Limit caffeine to 1-2 cups 8 to 16 oz a day or less  - Avoid dietary trigger  (aged cheese, peanuts, MSG, aspartame and nitrates)  - Patient is to have regular frequent meals to prevent headache onset  - Please drink at least 64 ounces of water a day to help remain hydrated  Importance of Healthy Sleep:  Behavioral sleep changes can promote restful, regular sleep and reduce headache  Simple changes like establishing consistent sleep and wake-up times, as well as getting between 7 and 8 hours of sleep a day, can make a world of difference  Experts also recommend avoiding substances that impair sleep, like caffeine, nicotine and alcohol, and also suggest winding down before bed to prevent sleep problems  To read more go to https://americanmigrainefoundation  org/resource-library/sleep/    Exercising for migraineurs:  Regular exercise can reduce the frequency and intensity of headaches and migraines  When one exercises, the body releases endorphins, which are the bodys natural painkillers  Exercise reduces stress and helps individuals to sleep at night  Exercising at least 30 to 40 minutes 3 times a week is sufficient for most patients  When exercising, follow this plan to prevent headaches:  - First, stay hydrated before, during, and after exercise  - Second part of the exercise plan is to eat sufficient food about an hour and a half before you exercise  Exercise causes ones blood sugar level to decrease, and it is important to have a source of energy    - Final part of the exercise plan is to warm-up   Do not jump into sudden, vigorous exercise if that triggers a headache or migraine  To read more go to https://americanmigrainefoundation  org/resource-library/effects-of-exercise-on-headaches-and-migraines/     Please call with any questions or concerns   Office number is [phone]

## 2022-07-05 NOTE — LETTER
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

## 2022-07-06 ENCOUNTER — VBI (OUTPATIENT)
Dept: ADMINISTRATIVE | Facility: OTHER | Age: 42
End: 2022-07-06

## 2022-07-09 ENCOUNTER — HOSPITAL ENCOUNTER (OUTPATIENT)
Dept: MAMMOGRAPHY | Facility: CLINIC | Age: 42
Discharge: HOME | End: 2022-07-09
Payer: COMMERCIAL

## 2022-07-09 VITALS — WEIGHT: 205.03 LBS | BODY MASS INDEX: 36.33 KG/M2 | HEIGHT: 63 IN

## 2022-07-09 DIAGNOSIS — Z12.31 ENCOUNTER FOR SCREENING MAMMOGRAM FOR MALIGNANT NEOPLASM OF BREAST: ICD-10-CM

## 2022-07-09 DIAGNOSIS — Z12.31 VISIT FOR SCREENING MAMMOGRAM: ICD-10-CM

## 2022-07-09 PROCEDURE — 77063 BREAST TOMOSYNTHESIS BI: CPT

## 2022-07-09 PROCEDURE — 77067 SCR MAMMO BI INCL CAD: CPT

## 2022-07-15 DIAGNOSIS — G43.709 CHRONIC MIGRAINE WITHOUT AURA WITHOUT STATUS MIGRAINOSUS, NOT INTRACTABLE: ICD-10-CM

## 2022-07-15 RX ORDER — ERENUMAB-AOOE 140 MG/ML
140 INJECTION, SOLUTION SUBCUTANEOUS
Qty: 3 ML | Refills: 4 | Status: SHIPPED | OUTPATIENT
Start: 2022-07-15 | End: 2022-09-16 | Stop reason: ALTCHOICE

## 2022-07-20 ENCOUNTER — TELEPHONE (OUTPATIENT)
Dept: NEUROLOGY | Facility: CLINIC | Age: 42
End: 2022-07-20

## 2022-07-20 NOTE — TELEPHONE ENCOUNTER
Please check on PA for Lidia as was ordered 7/5/22 and patient has still not obtained    Ben Nicolás for return to work letter    ----- Message from Virginia Mancilla RN sent at 7/20/2022  1:13 PM EDT -----  Regarding: FW: Note - Release Back to Work    ----- Message -----  From: Chidi White  Sent: 7/20/2022  11:13 AM EDT  To: Neurology [facility] Clinical Team 5  Subject: Note - Release Back to Work                      For the most part the dizziness has broken and I am only struggling with daily migraines  We anticipated my return to work at the end of this week  Can you please send me a note stating that I am clear to return to full duty  I believe your team is still working on getting the [address] [place] [address] approved       Thanks   Chidi White

## 2022-07-22 NOTE — TELEPHONE ENCOUNTER
Letter generated and sent to pt's [facility]    Pharmacy never notified us that PA is needed  Will initiate    Jose Villalobos script was sent to [facility] pharmacy on 7/5/22 and Ivan Lancaster script was sent on 7/15/22  Is pt on both meds?

## 2022-07-23 ENCOUNTER — TELEPHONE (OUTPATIENT)
Dept: NEUROLOGY | Facility: CLINIC | Age: 42
End: 2022-07-23

## 2022-07-23 NOTE — TELEPHONE ENCOUNTER
Per ROMEL Murillo Approvedon July 22  Request Reference Number: [id number]   Wiley Calderon 225/1 5 is approved through 07/22/2023    [employer] message sent to pt

## 2022-07-26 NOTE — TELEPHONE ENCOUNTER
Per [id number]  NURTEC TAB 75MG ODT is approved through 06/18/2023       Approval letter placed in clerical bin to be scanned

## 2022-07-28 ENCOUNTER — TELEPHONE (OUTPATIENT)
Dept: FAMILY MEDICINE CLINIC | Facility: CLINIC | Age: 42
End: 2022-07-28

## 2022-07-28 NOTE — TELEPHONE ENCOUNTER
Called patient to schedule a f/u but no answer  Left message asking her to reach out either to [facility] or [facility]  Awaiting patients response

## 2022-08-02 ENCOUNTER — HOSPITAL ENCOUNTER (OUTPATIENT)
Dept: MAMMOGRAPHY | Facility: CLINIC | Age: 42
Discharge: HOME | End: 2022-08-02
Payer: COMMERCIAL

## 2022-08-02 ENCOUNTER — HOSPITAL ENCOUNTER (OUTPATIENT)
Dept: ULTRASOUND IMAGING | Facility: CLINIC | Age: 42
Discharge: HOME | End: 2022-08-02
Payer: COMMERCIAL

## 2022-08-02 VITALS — BODY MASS INDEX: 36.33 KG/M2 | WEIGHT: 205.03 LBS | HEIGHT: 63 IN

## 2022-08-02 DIAGNOSIS — R92.8 ABNORMAL SCREENING MAMMOGRAM: ICD-10-CM

## 2022-08-02 PROCEDURE — G0279 TOMOSYNTHESIS, MAMMO: HCPCS

## 2022-08-02 PROCEDURE — 76642 ULTRASOUND BREAST LIMITED: CPT

## 2022-08-02 PROCEDURE — 77065 DX MAMMO INCL CAD UNI: CPT

## 2022-09-11 DIAGNOSIS — G43.709 CHRONIC MIGRAINE WITHOUT AURA WITHOUT STATUS MIGRAINOSUS, NOT INTRACTABLE: ICD-10-CM

## 2022-09-12 RX ORDER — RIMEGEPANT SULFATE 75 MG/75MG
TABLET, ORALLY DISINTEGRATING ORAL
Qty: 8 TABLET | Refills: 3 | Status: SHIPPED | OUTPATIENT
Start: 2022-09-12

## 2022-09-16 ENCOUNTER — TELEMEDICINE (OUTPATIENT)
Dept: NEUROLOGY | Facility: CLINIC | Age: 42
End: 2022-09-16

## 2022-09-16 DIAGNOSIS — F41.0 PANIC DISORDER WITHOUT AGORAPHOBIA: ICD-10-CM

## 2022-09-16 DIAGNOSIS — F42.9 OBSESSIVE-COMPULSIVE DISORDER WITH GOOD OR FAIR INSIGHT: ICD-10-CM

## 2022-09-16 DIAGNOSIS — G43.709 CHRONIC MIGRAINE WITHOUT AURA WITHOUT STATUS MIGRAINOSUS, NOT INTRACTABLE: ICD-10-CM

## 2022-09-16 DIAGNOSIS — F43.10 PTSD (POST-TRAUMATIC STRESS DISORDER): Primary | ICD-10-CM

## 2022-09-16 RX ORDER — METOCLOPRAMIDE 10 MG/1
TABLET ORAL
Qty: 10 TABLET | Refills: 6 | Status: SHIPPED | OUTPATIENT
Start: 2022-09-16
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
## 2022-09-16 NOTE — ASSESSMENT & PLAN NOTE
Preventive therapy for headaches:   Reproductive age women: Should take folic acid daily when taking anti-seizure drugs especially Depakote   -Over-the-counter supplements: to decrease intensity and frequency of migraines  - Magnesium Oxide 400mg a day  If any diarrhea or upset stomach, decrease dose  as tolerated  (oral magnesium oxide may be an effective preventive strategy for people with migraine  Some theories about how it works include the idea that magnesium can help to prevent waves of cortical spreading depression and aura  Magnesium, in theory, also reduces the release of inflammatory or activating chemicals that can cause migraine)  - Vitamin B2 200 mg twice a day  May cause the urine to turn yellow which is normal for B 2 to do and is not a sign that you are dehydrated  (may be an effective preventive medication in some people with migraine)  - Vitamin E which may help with menstrual migraine  There is limited data on this, but it may reduce nausea, photophobia and phonophobia during menstruation    - Ajovy 3 injections every 3 months  Abortive therapy for headaches:   - At onset of Nurtec 75 mg  Limit 1 in 24 hours  IF HAVE NOT TAKEN ALREADY  - In addition take Toradol 10 mg    May repeat in 8 hours if needed Or can choose a toradol injection of 60 mg     - If still present, take Decadron 1 mg  - On nights of your migraines, take Depakote at bedtime
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
## 2022-09-16 NOTE — PATIENT INSTRUCTIONS
Preventive therapy for headaches:   Reproductive age women: Should take folic acid daily when taking anti-seizure drugs especially Depakote   -Over-the-counter supplements: to decrease intensity and frequency of migraines  - Magnesium Oxide 400mg a day  If any diarrhea or upset stomach, decrease dose  as tolerated  (oral magnesium oxide may be an effective preventive strategy for people with migraine  Some theories about how it works include the idea that magnesium can help to prevent waves of cortical spreading depression and aura  Magnesium, in theory, also reduces the release of inflammatory or activating chemicals that can cause migraine)  - Vitamin B2 200 mg twice a day  May cause the urine to turn yellow which is normal for B 2 to do and is not a sign that you are dehydrated  (may be an effective preventive medication in some people with migraine)  - Vitamin E which may help with menstrual migraine  There is limited data on this, but it may reduce nausea, photophobia and phonophobia during menstruation    - Ajovy 3 injections every 3 months  Abortive therapy for headaches:   - At onset of Nurtec 75 mg  Limit 1 in 24 hours  IF HAVE NOT TAKEN ALREADY  - In addition take Toradol 10 mg  May repeat in 8 hours if needed Or can choose a toradol injection of 60 mg     - If still present, take Decadron 1 mg  - On nights of your migraines, take Depakote at bedtime       Headache management instructions  - When patient has a moderate to severe headache, they should seek rest, initiate relaxation and apply cold compresses to the head  - Maintain regular sleep schedule  Adults need at least 7-8 hours of uninterrupted a night  - Limit over the counter medications such as Tylenol, Ibuprofen, Aleve, Excedrin  (No more than 2- 3 times a week or max 10 a month)  - Maintain headache diary  Free CJ for a smart phone, which can be used is "Migraine fredy"  - Limit caffeine to 1-2 cups 8 to 16 oz a day or less    - Avoid dietary trigger  (aged cheese, peanuts, MSG, aspartame and nitrates)  - Patient is to have regular frequent meals to prevent headache onset  - Please drink at least 64 ounces of water a day to help remain hydrated  Importance of Healthy Sleep:  Behavioral sleep changes can promote restful, regular sleep and reduce headache  Simple changes like establishing consistent sleep and wake-up times, as well as getting between 7 and 8 hours of sleep a day, can make a world of difference  Experts also recommend avoiding substances that impair sleep, like caffeine, nicotine and alcohol, and also suggest winding down before bed to prevent sleep problems  To read more go to https://[employer]  org/resource-library/sleep/    Exercising for migraineurs:  Regular exercise can reduce the frequency and intensity of headaches and migraines  When one exercises, the body releases endorphins, which are the bodys natural painkillers  Exercise reduces stress and helps individuals to sleep at night  Exercising at least 30 to 40 minutes 3 times a week is sufficient for most patients  When exercising, follow this plan to prevent headaches:  - First, stay hydrated before, during, and after exercise  - Second part of the exercise plan is to eat sufficient food about an hour and a half before you exercise  Exercise causes ones blood sugar level to decrease, and it is important to have a source of energy    - Final part of the exercise plan is to warm-up  Do not jump into sudden, vigorous exercise if that triggers a headache or migraine  To read more go to https://[email or web address]  org/resource-library/effects-of-exercise-on-headaches-and-migraines/     Please call with any questions or concerns   Office number  [phone]

## 2023-02-03 ENCOUNTER — HOSPITAL ENCOUNTER (OUTPATIENT)
Dept: ULTRASOUND IMAGING | Facility: CLINIC | Age: 43
Discharge: HOME | End: 2023-02-03

## 2023-02-03 ENCOUNTER — HOSPITAL ENCOUNTER (OUTPATIENT)
Dept: MAMMOGRAPHY | Facility: CLINIC | Age: 43
Discharge: HOME | End: 2023-02-03

## 2023-02-03 DIAGNOSIS — R92.8 ABNORMAL FINDING ON BREAST IMAGING: ICD-10-CM

## 2023-02-03 RX ADMIN — IOHEXOL 100 ML: 350 INJECTION, SOLUTION INTRAVENOUS at 10:31

## 2023-03-17 ENCOUNTER — OFFICE VISIT (OUTPATIENT)
Dept: NEUROLOGY | Facility: CLINIC | Age: 43
End: 2023-03-17

## 2023-03-17 VITALS
DIASTOLIC BLOOD PRESSURE: 84 MMHG | BODY MASS INDEX: 38.54 KG/M2 | HEART RATE: 78 BPM | TEMPERATURE: 97.6 F | WEIGHT: 217.5 LBS | SYSTOLIC BLOOD PRESSURE: 126 MMHG | HEIGHT: 63 IN

## 2023-03-17 DIAGNOSIS — F43.10 PTSD (POST-TRAUMATIC STRESS DISORDER): Primary | ICD-10-CM

## 2023-03-17 DIAGNOSIS — G43.009 MIGRAINE WITHOUT AURA AND WITHOUT STATUS MIGRAINOSUS, NOT INTRACTABLE: ICD-10-CM

## 2023-03-17 DIAGNOSIS — G43.709 CHRONIC MIGRAINE WITHOUT AURA WITHOUT STATUS MIGRAINOSUS, NOT INTRACTABLE: ICD-10-CM

## 2023-03-17 RX ORDER — RIMEGEPANT SULFATE 75 MG/75MG
75 TABLET, ORALLY DISINTEGRATING ORAL EVERY OTHER DAY
Qty: 16 TABLET | Refills: 11 | Status: SHIPPED | OUTPATIENT
Start: 2023-03-17

## 2023-03-17 RX ORDER — DIPHENHYDRAMINE HCL 25 MG
25 CAPSULE ORAL
COMMUNITY

## 2023-03-17 RX ORDER — METOCLOPRAMIDE 10 MG/1
TABLET ORAL
Qty: 10 TABLET | Refills: 6 | Status: SHIPPED | OUTPATIENT
Start: 2023-03-17
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
## 2023-03-17 NOTE — ASSESSMENT & PLAN NOTE
Preventive therapy for headaches:   Reproductive age women: Should take folic acid daily when taking anti-seizure drugs especially Depakote   -Over-the-counter supplements: to decrease intensity and frequency of migraines  - Magnesium Oxide 400mg a day  If any diarrhea or upset stomach, decrease dose  as tolerated  (oral magnesium oxide may be an effective preventive strategy for people with migraine  Some theories about how it works include the idea that magnesium can help to prevent waves of cortical spreading depression and aura  Magnesium, in theory, also reduces the release of inflammatory or activating chemicals that can cause migraine)  - Vitamin B2 200 mg twice a day  May cause the urine to turn yellow which is normal for B 2 to do and is not a sign that you are dehydrated  (may be an effective preventive medication in some people with migraine)  - Vitamin E which may help with menstrual migraine  There is limited data on this, but it may reduce nausea, photophobia and phonophobia during menstruation    - Ajovy 3 injections every 3 months  Abortive therapy for headaches:   - At onset of Nurtec 75 mg  Limit 1 in 24 hours  IF HAVE NOT TAKEN ALREADY  - In addition take Toradol 10 mg    May repeat in 8 hours if needed Or can choose a toradol injection of 60 mg     - If still present, take Decadron 1 mg

## 2023-03-17 NOTE — PATIENT INSTRUCTIONS
Rewire Your Anxious Brain: How to Use the Neuroscience of Fear to End Anxiety, Panic, and Worry    Preventive therapy for headaches:   Reproductive age women: Should take folic acid daily when taking anti-seizure drugs especially Depakote   -Over-the-counter supplements: to decrease intensity and frequency of migraines  - Magnesium Oxide 400mg a day  If any diarrhea or upset stomach, decrease dose  as tolerated  (oral magnesium oxide may be an effective preventive strategy for people with migraine  Some theories about how it works include the idea that magnesium can help to prevent waves of cortical spreading depression and aura  Magnesium, in theory, also reduces the release of inflammatory or activating chemicals that can cause migraine)  - Vitamin B2 200 mg twice a day  May cause the urine to turn yellow which is normal for B 2 to do and is not a sign that you are dehydrated  (may be an effective preventive medication in some people with migraine)  - Vitamin E which may help with menstrual migraine  There is limited data on this, but it may reduce nausea, photophobia and phonophobia during menstruation    - Ajovy 3 injections every 3 months  Abortive therapy for headaches:   - At onset of Nurtec 75 mg  Limit 1 in 24 hours  IF HAVE NOT TAKEN ALREADY  - In addition take Toradol 10 mg  May repeat in 8 hours if needed Or can choose a toradol injection of 60 mg     - If still present, take Decadron 1 mg      Headache management instructions  - When patient has a moderate to severe headache, they should seek rest, initiate relaxation and apply cold compresses to the head  - Maintain regular sleep schedule  Adults need at least 7-8 hours of uninterrupted a night  - Limit over the counter medications such as Tylenol, Ibuprofen, Aleve, Excedrin  (No more than 2- 3 times a week or max 10 a month)  - Maintain headache diary    Free CJ for a smart phone, which can be used is "Migraine fredy"  - Limit caffeine to 1-2 cups 8 to 16 oz a day or less  - Avoid dietary trigger  (aged cheese, peanuts, MSG, aspartame and nitrates)  - Patient is to have regular frequent meals to prevent headache onset  - Please drink at least 64 ounces of water a day to help remain hydrated  Importance of Healthy Sleep:  Behavioral sleep changes can promote restful, regular sleep and reduce headache  Simple changes like establishing consistent sleep and wake-up times, as well as getting between 7 and 8 hours of sleep a day, can make a world of difference  Experts also recommend avoiding substances that impair sleep, like caffeine, nicotine and alcohol, and also suggest winding down before bed to prevent sleep problems  To read more go to https://american[employer]oundation  org/resource-library/sleep/    Exercising for migraineurs:  Regular exercise can reduce the frequency and intensity of headaches and migraines  When one exercises, the body releases endorphins, which are the body’s natural painkillers  Exercise reduces stress and helps individuals to sleep at night  Exercising at least 30 to 40 minutes 3 times a week is sufficient for most patients  When exercising, follow this plan to prevent headaches:  - First, stay hydrated before, during, and after exercise  - Second part of the exercise plan is to eat sufficient food about an hour and a half before you exercise  Exercise causes one’s blood sugar level to decrease, and it is important to have a source of energy    - Final part of the exercise plan is to warm-up  Do not jump into sudden, vigorous exercise if that triggers a headache or migraine  To read more go to https://american[employer]ation  org/resource-library/effects-of-exercise-on-headaches-and-migraines/     Please call with any questions or concerns   Office number is [phone]

## 2023-03-17 NOTE — ASSESSMENT & PLAN NOTE
Continue medication per other provider    Read Rewire Your Anxious Brain: How to Use the Neuroscience of Fear to End Anxiety, Panic, and Worry

## 2023-05-26 ENCOUNTER — TELEPHONE (OUTPATIENT)
Dept: NEUROLOGY | Facility: CLINIC | Age: 43
End: 2023-05-26

## 2023-05-26 NOTE — TELEPHONE ENCOUNTER
Received fax from [facility] PA is about to    Key [id number]    Per enc 22-PA will  23    Will initiate closer to exp date

## 2023-06-20 NOTE — TELEPHONE ENCOUNTER
Per [facility]- [id number]   NURTEC TAB 75MG ODT is approved through 06/15/2024     Approval letter placed in clerical bin to be scanned

## 2023-07-02 ENCOUNTER — TELEPHONE (OUTPATIENT)
Dept: NEUROLOGY | Facility: CLINIC | Age: 43
End: 2023-07-02

## 2023-07-02 NOTE — TELEPHONE ENCOUNTER
Received fax from [employer]. Lidia PA is about to     Per enc 22-PA will  23    PA initiated on CMM.   Priscila Jaimes    Awaiting determination

## 2023-07-07 NOTE — TELEPHONE ENCOUNTER
Per [facility]- [id number]. Brain Sanjeev 225/1.5 is approved through 07/02/2024.      Approval letter printed in CV office to be scanned in pt's chart

## 2023-09-20 ENCOUNTER — TELEMEDICINE (OUTPATIENT)
Dept: NEUROLOGY | Facility: CLINIC | Age: 43
End: 2023-09-20

## 2023-09-20 DIAGNOSIS — G43.709 CHRONIC MIGRAINE WITHOUT AURA WITHOUT STATUS MIGRAINOSUS, NOT INTRACTABLE: ICD-10-CM

## 2023-09-20 RX ORDER — TRAZODONE HYDROCHLORIDE 50 MG/1
50 TABLET ORAL AS NEEDED
COMMUNITY
Start: 2023-09-12

## 2023-09-20 RX ORDER — DEXTROAMPHETAMINE/AMPHETAMINE 10 MG
10 CAPSULE, EXT RELEASE 24 HR ORAL DAILY
COMMUNITY
Start: 2023-09-12

## 2023-09-20 RX ORDER — BUPROPION HYDROCHLORIDE 300 MG/1
300 TABLET ORAL DAILY
COMMUNITY
Start: 2023-09-12

## 2023-09-20 RX ORDER — DEXTROAMPHETAMINE SACCHARATE, AMPHETAMINE ASPARTATE, DEXTROAMPHETAMINE SULFATE AND AMPHETAMINE SULFATE 1.25; 1.25; 1.25; 1.25 MG/1; MG/1; MG/1; MG/1
1 TABLET ORAL 2 TIMES DAILY PRN
COMMUNITY
Start: 2023-08-11

## 2023-09-20 RX ORDER — KETOROLAC TROMETHAMINE 10 MG/1
10 TABLET, FILM COATED ORAL EVERY 6 HOURS PRN
Qty: 10 TABLET | Refills: 6 | Status: SHIPPED | OUTPATIENT
Start: 2023-09-20

## 2023-09-20 NOTE — ASSESSMENT & PLAN NOTE
Preventive therapy for headaches:   Reproductive age women: Should take folic acid daily when taking anti-seizure drugs especially Depakote.  -Over-the-counter supplements: to decrease intensity and frequency of migraines  - Magnesium Oxide 400mg a day. If any diarrhea or upset stomach, decrease dose  as tolerated  (oral magnesium oxide may be an effective preventive strategy for people with migraine. Some theories about how it works include the idea that magnesium can help to prevent waves of cortical spreading depression and aura. Magnesium, in theory, also reduces the release of inflammatory or activating chemicals that can cause migraine)  - Vitamin B2 200 mg twice a day. May cause the urine to turn yellow which is normal for B 2 to do and is not a sign that you are dehydrated. (may be an effective preventive medication in some people with migraine)  - Vitamin E which may help with menstrual migraine. There is limited data on this, but it may reduce nausea, photophobia and phonophobia during menstruation.   - Ajovy 3 injections every 3 months  Abortive therapy for headaches:   - At onset of Nurtec 75 mg. Limit 1 in 24 hours. - In addition take Toradol 10 mg.   May repeat in 8 hours if needed Or can choose a toradol injection of 60 mg.    - If still present, take Decadron 1 mg

## 2023-09-20 NOTE — PATIENT INSTRUCTIONS
Preventive therapy for headaches:   Reproductive age women: Should take folic acid daily when taking anti-seizure drugs especially Depakote.  -Over-the-counter supplements: to decrease intensity and frequency of migraines  - Magnesium Oxide 400mg a day. If any diarrhea or upset stomach, decrease dose  as tolerated  (oral magnesium oxide may be an effective preventive strategy for people with migraine. Some theories about how it works include the idea that magnesium can help to prevent waves of cortical spreading depression and aura. Magnesium, in theory, also reduces the release of inflammatory or activating chemicals that can cause migraine)  - Vitamin B2 200 mg twice a day. May cause the urine to turn yellow which is normal for B 2 to do and is not a sign that you are dehydrated. (may be an effective preventive medication in some people with migraine)  - Vitamin E which may help with menstrual migraine. There is limited data on this, but it may reduce nausea, photophobia and phonophobia during menstruation.   - Ajovy 3 injections every 3 months  Abortive therapy for headaches:   - At onset of Nurtec 75 mg. Limit 1 in 24 hours. - In addition take Toradol 10 mg. May repeat in 8 hours if needed Or can choose a toradol injection of 60 mg.    - If still present, take Decadron 1 mg      Headache management instructions  - When patient has a moderate to severe headache, they should seek rest, initiate relaxation and apply cold compresses to the head. - Maintain regular sleep schedule. Adults need at least 7-8 hours of uninterrupted a night. - Limit over the counter medications such as Tylenol, Ibuprofen, Aleve, Excedrin. (No more than 2- 3 times a week or max 10 a month). - Maintain headache diary. Free CJ for a smart phone, which can be used is "Migraine fredy"  - Limit caffeine to 1-2 cups 8 to 16 oz a day or less. - Avoid dietary trigger. (aged cheese, peanuts, MSG, aspartame and nitrates).   - Patient is to have regular frequent meals to prevent headache onset. - Please drink at least 64 ounces of water a day to help remain hydrated. Importance of Healthy Sleep:  Behavioral sleep changes can promote restful, regular sleep and reduce headache. Simple changes like establishing consistent sleep and wake-up times, as well as getting between 7 and 8 hours of sleep a day, can make a world of difference. Experts also recommend avoiding substances that impair sleep, like caffeine, nicotine and alcohol, and also suggest winding down before bed to prevent sleep problems. To read more go to https://[employer]. org/resource-library/sleep/    Exercising for migraineurs:  Regular exercise can reduce the frequency and intensity of headaches and migraines. When one exercises, the body releases endorphins, which are the body’s natural painkillers. Exercise reduces stress and helps individuals to sleep at night. Exercising at least 30 to 40 minutes 3 times a week is sufficient for most patients. When exercising, follow this plan to prevent headaches:  - First, stay hydrated before, during, and after exercise. - Second part of the exercise plan is to eat sufficient food about an hour and a half before you exercise. Exercise causes one’s blood sugar level to decrease, and it is important to have a source of energy.   - Final part of the exercise plan is to warm-up. Do not jump into sudden, vigorous exercise if that triggers a headache or migraine. To read more go to https://[employer]. org/resource-library/effects-of-exercise-on-headaches-and-migraines/     Please call with any questions or concerns.  Office number is [phone]
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
## 2023-10-18 NOTE — TELEPHONE ENCOUNTER
Diabetes mildly improved although remains poorly controlled with hemoglobin A1c at 8.8 today. Discussed treatment options with patient.    Lab Results   Component Value Date    HGBA1C 8.8 (A) 10/18/2023 Dr Kika Jimenez, gabapentin still showing on her after visit summary (med list)  If you can officially take it off, that would be great

## 2024-02-21 PROBLEM — Z13.220 SCREENING FOR LIPID DISORDERS: Status: RESOLVED | Noted: 2020-02-06 | Resolved: 2024-02-21

## 2024-02-21 PROBLEM — Z00.00 HEALTH CARE MAINTENANCE: Status: RESOLVED | Noted: 2020-05-26 | Resolved: 2024-02-21

## 2024-03-22 ENCOUNTER — TELEMEDICINE (OUTPATIENT)
Dept: NEUROLOGY | Facility: CLINIC | Age: 44
End: 2024-03-22
Payer: COMMERCIAL

## 2024-03-22 DIAGNOSIS — R29.818 SUSPECTED SLEEP APNEA: ICD-10-CM

## 2024-03-22 DIAGNOSIS — G43.709 CHRONIC MIGRAINE WITHOUT AURA WITHOUT STATUS MIGRAINOSUS, NOT INTRACTABLE: Primary | ICD-10-CM

## 2024-03-22 DIAGNOSIS — E55.9 VITAMIN D DEFICIENCY: ICD-10-CM

## 2024-03-22 DIAGNOSIS — E53.8 B12 DEFICIENCY: ICD-10-CM

## 2024-03-22 DIAGNOSIS — R47.01 APHASIA: ICD-10-CM

## 2024-03-22 PROCEDURE — 99215 OFFICE O/P EST HI 40 MIN: CPT | Performed by: PHYSICIAN ASSISTANT

## 2024-03-22 RX ORDER — METOCLOPRAMIDE 10 MG/1
TABLET ORAL
Qty: 10 TABLET | Refills: 6 | Status: SHIPPED | OUTPATIENT
Start: 2024-03-22

## 2024-03-22 RX ORDER — KETOROLAC TROMETHAMINE 10 MG/1
10 TABLET, FILM COATED ORAL EVERY 6 HOURS PRN
Qty: 10 TABLET | Refills: 6 | Status: SHIPPED | OUTPATIENT
Start: 2024-03-22
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
## 2024-03-22 NOTE — ASSESSMENT & PLAN NOTE
Patient reports worsening and frequent aphasia over the past 2 months.  Happens at least 2+ times a week.  Denies any migraine with these and no other associated symptoms.  However, due to sudden onset and worsening need to perform MRI of the Brain, MRA of the head, EEG and labs to ensure no nefarious conditions causing this.

## 2024-03-22 NOTE — ASSESSMENT & PLAN NOTE
Preventive therapy for headaches:   Reproductive age women: Should take folic acid daily when taking anti-seizure drugs especially Depakote.  -Over-the-counter supplements: to decrease intensity and frequency of migraines  - Magnesium Oxide 400mg a day.  If any diarrhea or upset stomach, decrease dose  as tolerated  (oral magnesium oxide may be an effective preventive strategy for people with migraine. Some theories about how it works include the idea that magnesium can help to prevent waves of cortical spreading depression and aura. Magnesium, in theory, also reduces the release of inflammatory or activating chemicals that can cause migraine)  - Vitamin B2 200 mg twice a day. May cause the urine to turn yellow which is normal for B 2 to do and is not a sign that you are dehydrated. (may be an effective preventive medication in some people with migraine)  - Vitamin E which may help with menstrual migraine. There is limited data on this, but it may reduce nausea, photophobia and phonophobia during menstruation.   - Ajovy 3 injections every 3 months  Abortive therapy for headaches:   - At onset of Nurtec 75 mg.  Limit 1 in 24 hours.   - In addition take Toradol 10 mg.  May repeat in 8 hours if needed Or can choose a toradol injection of 60 mg.    - If still present, take Decadron 1 mg    Please get blood work, MRIMRA brain and EEG  Please get home sleep study [phone]  Call with any worsening
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
## 2024-03-22 NOTE — ASSESSMENT & PLAN NOTE
Will start with home sleep study to assess for sleep apnea.  We did discuss that home sleep studies can give false negative or underestimate the severity of sleep apnea

## 2024-03-22 NOTE — PATIENT INSTRUCTIONS
"Preventive therapy for headaches:   Reproductive age women: Should take folic acid daily when taking anti-seizure drugs especially Depakote.  -Over-the-counter supplements: to decrease intensity and frequency of migraines  - Magnesium Oxide 400mg a day.  If any diarrhea or upset stomach, decrease dose  as tolerated  (oral magnesium oxide may be an effective preventive strategy for people with migraine. Some theories about how it works include the idea that magnesium can help to prevent waves of cortical spreading depression and aura. Magnesium, in theory, also reduces the release of inflammatory or activating chemicals that can cause migraine)  - Vitamin B2 200 mg twice a day. May cause the urine to turn yellow which is normal for B 2 to do and is not a sign that you are dehydrated. (may be an effective preventive medication in some people with migraine)  - Vitamin E which may help with menstrual migraine. There is limited data on this, but it may reduce nausea, photophobia and phonophobia during menstruation.   - Ajovy 3 injections every 3 months  Abortive therapy for headaches:   - At onset of Nurtec 75 mg.  Limit 1 in 24 hours.   - In addition take Toradol 10 mg.  May repeat in 8 hours if needed Or can choose a toradol injection of 60 mg.    - If still present, take Decadron 1 mg    Please get blood work, MRIMRA brain and EEG  Please get home sleep study [phone]  Call with any worsening       Headache management instructions  - When patient has a moderate to severe headache, they should seek rest, initiate relaxation and apply cold compresses to the head.   - Maintain regular sleep schedule. Adults need at least 7-8 hours of uninterrupted a night.   - Limit over the counter medications such as Tylenol, Ibuprofen, Aleve, Excedrin. (No more than 2- 3 times a week or max 10 a month).  - Maintain headache diary.  Free CJ for a smart phone, which can be used is \"Migraine fredy\"  - Limit caffeine to 1-2 cups 8 to " 16 oz a day or less.  - Avoid dietary trigger. (aged cheese, peanuts, MSG, aspartame and nitrates).  - Patient is to have regular frequent meals to prevent headache onset.    - Please drink at least 64 ounces of water a day to help remain hydrated.    Importance of Healthy Sleep:  Behavioral sleep changes can promote restful, regular sleep and reduce headache. Simple changes like establishing consistent sleep and wake-up times, as well as getting between 7 and 8 hours of sleep a day, can make a world of difference. Experts also recommend avoiding substances that impair sleep, like caffeine, nicotine and alcohol, and also suggest winding down before bed to prevent sleep problems. To read more go to https://[employer]an[employer]foundation.org/resource-library/sleep/    Exercising for migraineurs:  Regular exercise can reduce the frequency and intensity of headaches and migraines. When one exercises, the body releases endorphins, which are the body’s natural painkillers. Exercise reduces stress and helps individuals to sleep at night. Exercising at least 30 to 40 minutes 3 times a week is sufficient for most patients.   When exercising, follow this plan to prevent headaches:  - First, stay hydrated before, during, and after exercise.    - Second part of the exercise plan is to eat sufficient food about an hour and a half before you exercise. Exercise causes one’s blood sugar level to decrease, and it is important to have a source of energy.   - Final part of the exercise plan is to warm-up. Do not jump into sudden, vigorous exercise if that triggers a headache or migraine.   To read more go to https://[employer]an[employer].ainefoundation.org/resource-library/effects-of-exercise-on-headaches-and-migraines/     Please call with any questions or concerns. Office number is [phone]

## 2024-03-30 DIAGNOSIS — R29.818 SUSPECTED SLEEP APNEA: ICD-10-CM

## 2024-03-30 DIAGNOSIS — R47.01 APHASIA: Primary | ICD-10-CM

## 2024-04-24 ENCOUNTER — OFFICE VISIT (OUTPATIENT)
Dept: FAMILY MEDICINE CLINIC | Facility: CLINIC | Age: 44
End: 2024-04-24
Payer: COMMERCIAL

## 2024-04-24 VITALS
OXYGEN SATURATION: 99 % | DIASTOLIC BLOOD PRESSURE: 74 MMHG | HEIGHT: 64 IN | TEMPERATURE: 98.9 F | HEART RATE: 76 BPM | BODY MASS INDEX: 33.09 KG/M2 | RESPIRATION RATE: 18 BRPM | WEIGHT: 193.8 LBS | SYSTOLIC BLOOD PRESSURE: 120 MMHG

## 2024-04-24 DIAGNOSIS — E04.1 THYROID NODULE: ICD-10-CM

## 2024-04-24 DIAGNOSIS — Z12.4 CERVICAL CANCER SCREENING: ICD-10-CM

## 2024-04-24 DIAGNOSIS — Z12.31 ENCOUNTER FOR SCREENING MAMMOGRAM FOR MALIGNANT NEOPLASM OF BREAST: ICD-10-CM

## 2024-04-24 DIAGNOSIS — Z00.01 ENCOUNTER FOR WELL ADULT EXAM WITH ABNORMAL FINDINGS: Primary | ICD-10-CM

## 2024-04-24 DIAGNOSIS — E78.1 HIGH TRIGLYCERIDES: ICD-10-CM

## 2024-04-24 PROBLEM — D64.9 ANEMIA: Status: ACTIVE | Noted: 2021-06-15

## 2024-04-24 PROCEDURE — 99386 PREV VISIT NEW AGE 40-64: CPT | Performed by: FAMILY MEDICINE

## 2024-04-24 NOTE — PROGRESS NOTES
Assessment/Plan:     Diagnoses and all orders for this visit:    Encounter for well adult exam with abnormal findings    Thyroid nodule  -     US thyroid; Future    Encounter for screening mammogram for malignant neoplasm of breast  -     Mammo screening bilateral w 3d & cad; Future    High triglycerides  -     Lipid panel; Future    Cervical cancer screening  -     Ambulatory Referral to Obstetrics / Gynecology; Future     Patient referred to GYN  Lipid panel ordered to go with her other blood work  Mammogram ordered  Ultrasound thyroid ordered for follow-up she will continue her current medication she will follow-up with her neurologist and psychiatrist  She can follow-up with me in 1 year or sooner if needed      Subjective:     Chief Complaint   Patient presents with    [facility]     New patient check up         Patient ID: Antonia Flores is a 44 y.o. female.    45 y/o F who comes in to establish care. States her last PCP visit was 2 years ago. She has no specific concerns today but would like to catch up on preventative health things. Takes Adderall daily. Ajovy injections every 3 weeks. Follows with neurology q6 months for severe migraines. No OBGYN care currently- no Pap within the last 10 years approx. Also was previously having thyroid US for nodule follow-ups yearly, but last one was in 2022. Tdap within the past year.       The following portions of the patient's history were reviewed and updated as appropriate: allergies, current medications, past family history, past medical history, past social history, past surgical history and problem list.    Review of Systems   Constitutional: Negative.    HENT: Negative.     Eyes: Negative.    Respiratory: Negative.     Cardiovascular: Negative.    Gastrointestinal: Negative.    Endocrine: Negative.    Genitourinary: Negative.    Musculoskeletal: Negative.    Skin: Negative.    Allergic/Immunologic: Negative.    Neurological: Negative.    Hematological:  "Negative.    Psychiatric/Behavioral: Negative.     All other systems reviewed and are negative.        Objective:    Vitals:    04/24/24 1119   BP: 120/74   BP Location: Left arm   Patient Position: Sitting   Cuff Size: Large   Pulse: 76   Resp: 18   Temp: 98.9 °F (37.2 °C)   TempSrc: Tympanic   SpO2: 99%   Weight: 87.9 kg (193 lb 12.8 oz)   Height: 5' 3.5\" (1.613 m)          Physical Exam  Vitals and nursing note reviewed.   Constitutional:       Appearance: Normal appearance. She is well-developed.   HENT:      Head: Normocephalic and atraumatic.      Right Ear: Tympanic membrane, ear canal and external ear normal.      Left Ear: Tympanic membrane, ear canal and external ear normal.      Nose: Nose normal.   Eyes:      Conjunctiva/sclera: Conjunctivae normal.      Pupils: Pupils are equal, round, and reactive to light.   Cardiovascular:      Rate and Rhythm: Normal rate and regular rhythm.      Heart sounds: Normal heart sounds.   Pulmonary:      Effort: Pulmonary effort is normal.      Breath sounds: Normal breath sounds.   Abdominal:      General: Bowel sounds are normal.      Palpations: Abdomen is soft.   Musculoskeletal:         General: Normal range of motion.      Cervical back: Normal range of motion.   Skin:     General: Skin is warm and dry.   Neurological:      General: No focal deficit present.      Mental Status: She is alert and oriented to person, place, and time.      Deep Tendon Reflexes: Reflexes are normal and symmetric.   Psychiatric:         Behavior: Behavior normal.         Thought Content: Thought content normal.         Judgment: Judgment normal.         "

## 2024-05-06 DIAGNOSIS — G43.709 CHRONIC MIGRAINE WITHOUT AURA WITHOUT STATUS MIGRAINOSUS, NOT INTRACTABLE: ICD-10-CM

## 2024-05-06 DIAGNOSIS — G43.009 MIGRAINE WITHOUT AURA AND WITHOUT STATUS MIGRAINOSUS, NOT INTRACTABLE: ICD-10-CM

## 2024-05-07 RX ORDER — FREMANEZUMAB-VFRM 225 MG/1.5ML
INJECTION SUBCUTANEOUS
Qty: 4.5 ML | Refills: 3 | Status: SHIPPED | OUTPATIENT
Start: 2024-05-07

## 2024-05-07 RX ORDER — RIMEGEPANT SULFATE 75 MG/75MG
75 TABLET, ORALLY DISINTEGRATING ORAL EVERY OTHER DAY
Qty: 16 TABLET | Refills: 11 | Status: SHIPPED | OUTPATIENT
Start: 2024-05-07

## 2024-05-21 DIAGNOSIS — G43.711 INTRACTABLE CHRONIC MIGRAINE WITHOUT AURA AND WITH STATUS MIGRAINOSUS: Primary | ICD-10-CM

## 2024-05-21 RX ORDER — OLANZAPINE 5 MG/1
5 TABLET ORAL
Qty: 5 TABLET | Refills: 0 | Status: SHIPPED | OUTPATIENT
Start: 2024-05-21

## 2024-05-21 RX ORDER — PROCHLORPERAZINE MALEATE 10 MG
10 TABLET ORAL EVERY 6 HOURS PRN
Qty: 10 TABLET | Refills: 0 | Status: SHIPPED | OUTPATIENT
Start: 2024-05-21

## 2024-05-21 RX ORDER — DEXAMETHASONE 4 MG/1
TABLET ORAL
Qty: 9 TABLET | Refills: 0 | Status: SHIPPED | OUTPATIENT
Start: 2024-05-21

## 2024-06-13 ENCOUNTER — HOSPITAL ENCOUNTER (OUTPATIENT)
Dept: MRI IMAGING | Facility: HOSPITAL | Age: 44
End: 2024-06-13
Payer: COMMERCIAL

## 2024-06-13 DIAGNOSIS — R47.01 APHASIA: ICD-10-CM

## 2024-06-13 PROCEDURE — 70544 MR ANGIOGRAPHY HEAD W/O DYE: CPT

## 2024-06-13 PROCEDURE — 70553 MRI BRAIN STEM W/O & W/DYE: CPT

## 2024-06-13 PROCEDURE — A9585 GADOBUTROL INJECTION: HCPCS | Performed by: FAMILY MEDICINE

## 2024-06-13 RX ORDER — GADOBUTROL 604.72 MG/ML
9 INJECTION INTRAVENOUS
Status: COMPLETED | OUTPATIENT
Start: 2024-06-13 | End: 2024-06-13

## 2024-06-13 RX ADMIN — GADOBUTROL 9 ML: 604.72 INJECTION INTRAVENOUS at 21:12

## 2024-06-17 LAB
CHOLEST SERPL-MCNC: 131 MG/DL
CHOLEST/HDLC SERPL: 2.2 (CALC)
HDLC SERPL-MCNC: 59 MG/DL
LDLC SERPL CALC-MCNC: 57 MG/DL (CALC)
NONHDLC SERPL-MCNC: 72 MG/DL (CALC)
TRIGL SERPL-MCNC: 73 MG/DL

## 2024-06-18 ENCOUNTER — HOSPITAL ENCOUNTER (OUTPATIENT)
Dept: ULTRASOUND IMAGING | Facility: CLINIC | Age: 44
Discharge: HOME | End: 2024-06-18
Payer: COMMERCIAL

## 2024-06-18 ENCOUNTER — APPOINTMENT (OUTPATIENT)
Dept: ULTRASOUND IMAGING | Facility: HOSPITAL | Age: 44
End: 2024-06-18
Payer: COMMERCIAL

## 2024-06-18 ENCOUNTER — HOSPITAL ENCOUNTER (OUTPATIENT)
Dept: MAMMOGRAPHY | Facility: CLINIC | Age: 44
Discharge: HOME | End: 2024-06-18
Payer: COMMERCIAL

## 2024-06-18 VITALS — WEIGHT: 193 LBS | HEIGHT: 64 IN | BODY MASS INDEX: 32.95 KG/M2

## 2024-06-18 DIAGNOSIS — E04.1 THYROID NODULE: ICD-10-CM

## 2024-06-18 DIAGNOSIS — Z12.31 ENCOUNTER FOR SCREENING MAMMOGRAM FOR MALIGNANT NEOPLASM OF BREAST: ICD-10-CM

## 2024-06-18 PROCEDURE — 76536 US EXAM OF HEAD AND NECK: CPT

## 2024-06-18 PROCEDURE — 77063 BREAST TOMOSYNTHESIS BI: CPT

## 2024-06-18 PROCEDURE — 77067 SCR MAMMO BI INCL CAD: CPT

## 2024-06-25 ENCOUNTER — TELEPHONE (OUTPATIENT)
Dept: FAMILY MEDICINE CLINIC | Facility: CLINIC | Age: 44
End: 2024-06-25

## 2024-07-12 ENCOUNTER — HOSPITAL ENCOUNTER (OUTPATIENT)
Dept: MAMMOGRAPHY | Facility: CLINIC | Age: 44
Discharge: HOME | End: 2024-07-12
Payer: COMMERCIAL

## 2024-07-12 ENCOUNTER — HOSPITAL ENCOUNTER (OUTPATIENT)
Dept: ULTRASOUND IMAGING | Facility: CLINIC | Age: 44
Discharge: HOME | End: 2024-07-12
Payer: COMMERCIAL

## 2024-07-12 DIAGNOSIS — R92.8 ABNORMAL SCREENING MAMMOGRAM: ICD-10-CM

## 2024-07-12 PROCEDURE — 76642 ULTRASOUND BREAST LIMITED: CPT

## 2024-07-12 PROCEDURE — 77065 DX MAMMO INCL CAD UNI: CPT

## 2024-07-12 PROCEDURE — G0279 TOMOSYNTHESIS, MAMMO: HCPCS

## 2024-08-12 ENCOUNTER — TELEPHONE (OUTPATIENT)
Age: 44
End: 2024-08-12

## 2024-08-12 DIAGNOSIS — G43.711 INTRACTABLE CHRONIC MIGRAINE WITHOUT AURA AND WITH STATUS MIGRAINOSUS: ICD-10-CM

## 2024-08-12 RX ORDER — OLANZAPINE 5 MG/1
5 TABLET ORAL
Qty: 5 TABLET | Refills: 0 | Status: SHIPPED | OUTPATIENT
Start: 2024-08-12

## 2024-08-12 RX ORDER — DEXAMETHASONE 4 MG/1
TABLET ORAL
Qty: 9 TABLET | Refills: 0 | Status: SHIPPED | OUTPATIENT
Start: 2024-08-12

## 2024-08-12 NOTE — TELEPHONE ENCOUNTER
Nurtec PA completed on [facility]  Key: [id number]  Urgent request    [employer]ric[employer] message sent to pt-see [employer] message from 8/11

## 2024-08-12 NOTE — TELEPHONE ENCOUNTER
Lidia CROWE completed on [employer]  Key: [id number]  Received immediate approval on [facility]    Approved today by Angel[place] 2017 Counts include 234 beds at the [facility]  Request Reference Number: [id number]. AJOVY /1.5 is approved through 08/12/2025. Your patient may now fill this prescription and it will be covered.  Authorization Expiration Date: 8/12/2025    [employer] message sent to pt-see [employer] message from 8/11

## 2025-02-19 ENCOUNTER — TELEPHONE (OUTPATIENT)
Age: 45
End: 2025-02-19

## 2025-02-19 NOTE — TELEPHONE ENCOUNTER
02/19/25    Patient called office to schedule a NP Appt for son but due to son age (11 y/o) he needs to see [facility].    Informed MOM / PATIENT and provided Contact Number from [facility] [phone] and offered to be connected.    Patient stated that she will contact them in the Morning.    While on the call with patient she requested to schedule her f/u appt with Miss. Weinstein.    Offered Next Available and to be placed on the waiting list.    Patient AGREED.     Appt rescheduled for 05/14/25, 9:00 AM With Miss. Weinstein at the [place] Location.       Any questions, please contact Patient.  Thank You.

## 2025-02-20 RX ORDER — DEXTROAMPHETAMINE SACCHARATE, AMPHETAMINE ASPARTATE, DEXTROAMPHETAMINE SULFATE AND AMPHETAMINE SULFATE 5; 5; 5; 5 MG/1; MG/1; MG/1; MG/1
1 TABLET ORAL 2 TIMES DAILY
COMMUNITY
Start: 2025-02-12

## 2025-02-20 RX ORDER — MELOXICAM 15 MG/1
15 TABLET ORAL DAILY
COMMUNITY
Start: 2024-07-10 | End: 2025-07-10

## 2025-02-20 RX ORDER — HYDROXYZINE PAMOATE 50 MG/1
1 CAPSULE ORAL
COMMUNITY
Start: 2025-01-10

## 2025-04-17 DIAGNOSIS — G43.709 CHRONIC MIGRAINE WITHOUT AURA WITHOUT STATUS MIGRAINOSUS, NOT INTRACTABLE: ICD-10-CM

## 2025-04-18 RX ORDER — FREMANEZUMAB-VFRM 225 MG/1.5ML
INJECTION SUBCUTANEOUS
Qty: 4.5 ML | Refills: 3 | Status: SHIPPED | OUTPATIENT
Start: 2025-04-18

## 2025-04-18 RX ORDER — KETOROLAC TROMETHAMINE 10 MG/1
TABLET, FILM COATED ORAL
Qty: 10 TABLET | Refills: 6 | Status: SHIPPED | OUTPATIENT
Start: 2025-04-18

## 2025-04-18 RX ORDER — METOCLOPRAMIDE 10 MG/1
TABLET ORAL
Qty: 10 TABLET | Refills: 6 | Status: SHIPPED | OUTPATIENT
Start: 2025-04-18

## 2025-05-13 ENCOUNTER — TELEPHONE (OUTPATIENT)
Age: 45
End: 2025-05-13

## 2025-06-04 DIAGNOSIS — G43.009 MIGRAINE WITHOUT AURA AND WITHOUT STATUS MIGRAINOSUS, NOT INTRACTABLE: ICD-10-CM

## 2025-06-05 RX ORDER — RIMEGEPANT SULFATE 75 MG/75MG
75 TABLET, ORALLY DISINTEGRATING ORAL EVERY OTHER DAY
Qty: 16 TABLET | Refills: 0 | Status: SHIPPED | OUTPATIENT
Start: 2025-06-05

## 2025-08-19 ENCOUNTER — NURSE TRIAGE (OUTPATIENT)
Age: 45
End: 2025-08-19